# Patient Record
Sex: MALE | Race: WHITE | Employment: UNEMPLOYED | URBAN - METROPOLITAN AREA
[De-identification: names, ages, dates, MRNs, and addresses within clinical notes are randomized per-mention and may not be internally consistent; named-entity substitution may affect disease eponyms.]

---

## 2018-01-02 ENCOUNTER — HOSPITAL ENCOUNTER (OUTPATIENT)
Dept: NEUROLOGY | Facility: CLINIC | Age: 28
Setting detail: THERAPIES SERIES
Discharge: STILL ADMITTED OR ADMINISTRATIVE | End: 2018-01-02
Attending: PSYCHIATRY & NEUROLOGY

## 2018-01-02 ENCOUNTER — HOSPITAL ENCOUNTER (OUTPATIENT)
Dept: NEUROLOGY | Facility: CLINIC | Age: 28
Setting detail: THERAPIES SERIES
Discharge: STILL ADMITTED OR ADMINISTRATIVE | End: 2018-01-02
Attending: SOCIAL WORKER

## 2018-01-02 DIAGNOSIS — S06.9XAA TBI (TRAUMATIC BRAIN INJURY) (H): ICD-10-CM

## 2018-01-02 DIAGNOSIS — R41.89 NEUROCOGNITIVE DEFICITS: ICD-10-CM

## 2018-01-02 DIAGNOSIS — R29.818 NEUROCOGNITIVE DEFICITS: ICD-10-CM

## 2018-01-02 RX ORDER — BENZOCAINE/MENTHOL 6 MG-10 MG
LOZENGE MUCOUS MEMBRANE 3 TIMES DAILY
Status: SHIPPED | COMMUNITY
Start: 2018-01-02 | End: 2023-02-16

## 2018-01-02 RX ORDER — GINSENG 100 MG
1 CAPSULE ORAL
Status: SHIPPED | COMMUNITY
Start: 2018-01-02 | End: 2022-04-26

## 2018-01-02 RX ORDER — DOCUSATE SODIUM 100 MG/1
100 CAPSULE, LIQUID FILLED ORAL 2 TIMES DAILY PRN
Status: SHIPPED | COMMUNITY
Start: 2018-01-02 | End: 2023-05-16

## 2018-01-02 RX ORDER — LEVETIRACETAM 750 MG/1
3000 TABLET ORAL DAILY
Status: SHIPPED | COMMUNITY
Start: 2018-01-02

## 2018-01-02 RX ORDER — LOPERAMIDE HCL 2 MG
2 CAPSULE ORAL 4 TIMES DAILY PRN
Status: SHIPPED | COMMUNITY
Start: 2018-01-02 | End: 2023-02-16

## 2018-01-02 RX ORDER — TRAZODONE HYDROCHLORIDE 50 MG/1
50 TABLET, FILM COATED ORAL AT BEDTIME
Status: SHIPPED | COMMUNITY
Start: 2018-01-02 | End: 2023-02-16

## 2018-01-02 RX ORDER — IBUPROFEN 200 MG
400 TABLET ORAL EVERY 6 HOURS PRN
Status: SHIPPED | COMMUNITY
Start: 2018-01-02

## 2018-01-02 RX ORDER — DEXTROMETHORPHAN HBR. AND GUAIFENESIN 10; 100 MG/5ML; MG/5ML
10 SOLUTION ORAL EVERY 12 HOURS
Status: SHIPPED | COMMUNITY
Start: 2018-01-02 | End: 2023-02-16

## 2018-01-04 ENCOUNTER — HOSPITAL ENCOUNTER (OUTPATIENT)
Dept: NEUROLOGY | Facility: CLINIC | Age: 28
Setting detail: THERAPIES SERIES
Discharge: STILL ADMITTED OR ADMINISTRATIVE | End: 2018-01-04
Attending: PSYCHIATRY & NEUROLOGY

## 2018-01-04 DIAGNOSIS — G40.A09 NONINTRACTABLE ABSENCE EPILEPSY WITHOUT STATUS EPILEPTICUS (H): ICD-10-CM

## 2018-01-04 DIAGNOSIS — G04.81 RASMUSSEN'S SYNDROME: ICD-10-CM

## 2018-01-04 DIAGNOSIS — R41.89 NEUROCOGNITIVE DEFICITS: ICD-10-CM

## 2018-01-04 DIAGNOSIS — R29.818 NEUROCOGNITIVE DEFICITS: ICD-10-CM

## 2018-01-04 DIAGNOSIS — F02.80 MAJOR NEUROCOGNITIVE DISORDER DUE TO MULTIPLE ETIOLOGIES (H): ICD-10-CM

## 2018-01-04 DIAGNOSIS — F33.1 MAJOR DEPRESSIVE DISORDER, RECURRENT EPISODE, MODERATE (H): ICD-10-CM

## 2018-01-10 ENCOUNTER — HOSPITAL ENCOUNTER (OUTPATIENT)
Dept: SPEECH THERAPY | Age: 28
Setting detail: THERAPIES SERIES
Discharge: STILL ADMITTED OR ADMINISTRATIVE | End: 2018-01-10
Attending: PSYCHIATRY & NEUROLOGY

## 2018-01-10 ENCOUNTER — HOSPITAL ENCOUNTER (OUTPATIENT)
Dept: OCCUPATIONAL THERAPY | Age: 28
Setting detail: THERAPIES SERIES
Discharge: STILL ADMITTED OR ADMINISTRATIVE | End: 2018-01-10
Attending: PSYCHIATRY & NEUROLOGY

## 2018-01-10 DIAGNOSIS — R41.89 COGNITIVE IMPAIRMENT: ICD-10-CM

## 2018-01-10 DIAGNOSIS — R41.3: ICD-10-CM

## 2018-01-10 DIAGNOSIS — T81.89XS: ICD-10-CM

## 2018-01-29 ENCOUNTER — HOSPITAL ENCOUNTER (OUTPATIENT)
Dept: SPEECH THERAPY | Age: 28
Setting detail: THERAPIES SERIES
Discharge: STILL ADMITTED OR ADMINISTRATIVE | End: 2018-01-29
Attending: SPEECH-LANGUAGE PATHOLOGIST

## 2018-01-29 ENCOUNTER — HOSPITAL ENCOUNTER (OUTPATIENT)
Dept: OCCUPATIONAL THERAPY | Age: 28
Setting detail: THERAPIES SERIES
Discharge: STILL ADMITTED OR ADMINISTRATIVE | End: 2018-01-29
Attending: OCCUPATIONAL THERAPIST

## 2018-01-29 DIAGNOSIS — T81.89XS: ICD-10-CM

## 2018-01-29 DIAGNOSIS — R41.89 COGNITIVE IMPAIRMENT: ICD-10-CM

## 2018-01-29 DIAGNOSIS — R41.3: ICD-10-CM

## 2018-02-02 ENCOUNTER — HOSPITAL ENCOUNTER (OUTPATIENT)
Dept: NEUROLOGY | Facility: CLINIC | Age: 28
Setting detail: THERAPIES SERIES
Discharge: STILL ADMITTED OR ADMINISTRATIVE | End: 2018-02-02
Attending: PSYCHIATRY & NEUROLOGY

## 2018-02-02 DIAGNOSIS — F02.80 MAJOR NEUROCOGNITIVE DISORDER DUE TO MULTIPLE ETIOLOGIES (H): ICD-10-CM

## 2018-02-02 DIAGNOSIS — G04.81 RASMUSSEN'S SYNDROME: ICD-10-CM

## 2018-02-02 DIAGNOSIS — G40.A09 NONINTRACTABLE ABSENCE EPILEPSY WITHOUT STATUS EPILEPTICUS (H): ICD-10-CM

## 2018-02-02 DIAGNOSIS — F33.1 MAJOR DEPRESSIVE DISORDER, RECURRENT EPISODE, MODERATE (H): ICD-10-CM

## 2018-02-05 ENCOUNTER — HOSPITAL ENCOUNTER (OUTPATIENT)
Dept: SPEECH THERAPY | Age: 28
Setting detail: THERAPIES SERIES
Discharge: STILL ADMITTED OR ADMINISTRATIVE | End: 2018-02-05
Attending: PSYCHIATRY & NEUROLOGY

## 2018-02-05 ENCOUNTER — HOSPITAL ENCOUNTER (OUTPATIENT)
Dept: OCCUPATIONAL THERAPY | Age: 28
Setting detail: THERAPIES SERIES
Discharge: STILL ADMITTED OR ADMINISTRATIVE | End: 2018-02-05
Attending: PSYCHIATRY & NEUROLOGY

## 2018-02-05 DIAGNOSIS — R41.3: ICD-10-CM

## 2018-02-05 DIAGNOSIS — T81.89XS: ICD-10-CM

## 2018-02-05 DIAGNOSIS — R41.89 COGNITIVE IMPAIRMENT: ICD-10-CM

## 2018-02-12 ENCOUNTER — HOSPITAL ENCOUNTER (OUTPATIENT)
Dept: SPEECH THERAPY | Age: 28
Setting detail: THERAPIES SERIES
Discharge: STILL ADMITTED OR ADMINISTRATIVE | End: 2018-02-12
Attending: PSYCHIATRY & NEUROLOGY

## 2018-02-12 ENCOUNTER — HOSPITAL ENCOUNTER (OUTPATIENT)
Dept: OCCUPATIONAL THERAPY | Age: 28
Setting detail: THERAPIES SERIES
Discharge: STILL ADMITTED OR ADMINISTRATIVE | End: 2018-02-12
Attending: PSYCHIATRY & NEUROLOGY

## 2018-02-12 DIAGNOSIS — R41.89 COGNITIVE IMPAIRMENT: ICD-10-CM

## 2018-02-12 DIAGNOSIS — R41.3: ICD-10-CM

## 2018-02-12 DIAGNOSIS — T81.89XS: ICD-10-CM

## 2018-02-13 ENCOUNTER — HOSPITAL ENCOUNTER (OUTPATIENT)
Dept: NEUROLOGY | Facility: CLINIC | Age: 28
Setting detail: THERAPIES SERIES
Discharge: STILL ADMITTED OR ADMINISTRATIVE | End: 2018-02-13
Attending: PSYCHIATRY & NEUROLOGY

## 2018-02-13 DIAGNOSIS — R41.89 NEUROCOGNITIVE DEFICITS: ICD-10-CM

## 2018-02-13 DIAGNOSIS — R29.818 NEUROCOGNITIVE DEFICITS: ICD-10-CM

## 2018-03-01 ENCOUNTER — HOSPITAL ENCOUNTER (OUTPATIENT)
Dept: SPEECH THERAPY | Age: 28
Setting detail: THERAPIES SERIES
Discharge: STILL ADMITTED OR ADMINISTRATIVE | End: 2018-03-01
Attending: PSYCHIATRY & NEUROLOGY

## 2018-03-01 DIAGNOSIS — R41.3: ICD-10-CM

## 2018-03-01 DIAGNOSIS — T81.89XS: ICD-10-CM

## 2018-03-12 ENCOUNTER — HOSPITAL ENCOUNTER (OUTPATIENT)
Dept: SPEECH THERAPY | Age: 28
Setting detail: THERAPIES SERIES
Discharge: STILL ADMITTED OR ADMINISTRATIVE | End: 2018-03-12
Attending: PSYCHIATRY & NEUROLOGY

## 2018-03-12 DIAGNOSIS — R41.3: ICD-10-CM

## 2018-03-12 DIAGNOSIS — T81.89XS: ICD-10-CM

## 2018-03-29 ENCOUNTER — HOSPITAL ENCOUNTER (OUTPATIENT)
Dept: SPEECH THERAPY | Age: 28
Setting detail: THERAPIES SERIES
Discharge: STILL ADMITTED OR ADMINISTRATIVE | End: 2018-03-29
Attending: PSYCHIATRY & NEUROLOGY

## 2018-03-29 ENCOUNTER — HOSPITAL ENCOUNTER (OUTPATIENT)
Dept: OCCUPATIONAL THERAPY | Age: 28
Setting detail: THERAPIES SERIES
Discharge: STILL ADMITTED OR ADMINISTRATIVE | End: 2018-03-29
Attending: PSYCHIATRY & NEUROLOGY

## 2018-03-29 DIAGNOSIS — M25.60 DECREASED RANGE OF MOTION: ICD-10-CM

## 2018-03-29 DIAGNOSIS — T81.89XS: ICD-10-CM

## 2018-03-29 DIAGNOSIS — R41.89 COGNITIVE IMPAIRMENT: ICD-10-CM

## 2018-03-29 DIAGNOSIS — R41.3: ICD-10-CM

## 2018-04-05 ENCOUNTER — AMBULATORY - HEALTHEAST (OUTPATIENT)
Dept: SPEECH THERAPY | Facility: CLINIC | Age: 28
End: 2018-04-05

## 2018-04-05 ENCOUNTER — HOSPITAL ENCOUNTER (OUTPATIENT)
Dept: OCCUPATIONAL THERAPY | Age: 28
Setting detail: THERAPIES SERIES
Discharge: STILL ADMITTED OR ADMINISTRATIVE | End: 2018-04-05
Attending: PSYCHIATRY & NEUROLOGY

## 2018-04-05 ENCOUNTER — HOSPITAL ENCOUNTER (OUTPATIENT)
Dept: SPEECH THERAPY | Age: 28
Setting detail: THERAPIES SERIES
Discharge: STILL ADMITTED OR ADMINISTRATIVE | End: 2018-04-05
Attending: PSYCHIATRY & NEUROLOGY

## 2018-04-05 DIAGNOSIS — M25.60 DECREASED RANGE OF MOTION: ICD-10-CM

## 2018-04-05 DIAGNOSIS — R41.3: ICD-10-CM

## 2018-04-05 DIAGNOSIS — T81.89XS: ICD-10-CM

## 2018-04-12 ENCOUNTER — HOSPITAL ENCOUNTER (OUTPATIENT)
Dept: SPEECH THERAPY | Age: 28
Setting detail: THERAPIES SERIES
Discharge: STILL ADMITTED OR ADMINISTRATIVE | End: 2018-04-12
Attending: PSYCHIATRY & NEUROLOGY

## 2018-04-12 ENCOUNTER — HOSPITAL ENCOUNTER (OUTPATIENT)
Dept: OCCUPATIONAL THERAPY | Age: 28
Setting detail: THERAPIES SERIES
Discharge: STILL ADMITTED OR ADMINISTRATIVE | End: 2018-04-12
Attending: PSYCHIATRY & NEUROLOGY

## 2018-04-12 DIAGNOSIS — R41.3: ICD-10-CM

## 2018-04-12 DIAGNOSIS — M25.60 DECREASED RANGE OF MOTION: ICD-10-CM

## 2018-04-12 DIAGNOSIS — T81.89XS: ICD-10-CM

## 2018-04-19 ENCOUNTER — HOSPITAL ENCOUNTER (OUTPATIENT)
Dept: OCCUPATIONAL THERAPY | Age: 28
Setting detail: THERAPIES SERIES
Discharge: STILL ADMITTED OR ADMINISTRATIVE | End: 2018-04-19
Attending: PSYCHIATRY & NEUROLOGY

## 2018-04-19 DIAGNOSIS — M25.60 DECREASED RANGE OF MOTION: ICD-10-CM

## 2018-04-26 ENCOUNTER — HOSPITAL ENCOUNTER (OUTPATIENT)
Dept: OCCUPATIONAL THERAPY | Age: 28
Setting detail: THERAPIES SERIES
Discharge: STILL ADMITTED OR ADMINISTRATIVE | End: 2018-04-26
Attending: PSYCHIATRY & NEUROLOGY

## 2018-04-26 DIAGNOSIS — M25.60 DECREASED RANGE OF MOTION: ICD-10-CM

## 2018-05-07 ENCOUNTER — AMBULATORY - HEALTHEAST (OUTPATIENT)
Dept: SPEECH THERAPY | Facility: CLINIC | Age: 28
End: 2018-05-07

## 2018-05-10 ENCOUNTER — HOSPITAL ENCOUNTER (OUTPATIENT)
Dept: OCCUPATIONAL THERAPY | Age: 28
Setting detail: THERAPIES SERIES
Discharge: STILL ADMITTED OR ADMINISTRATIVE | End: 2018-05-10
Attending: PSYCHIATRY & NEUROLOGY

## 2018-05-10 DIAGNOSIS — M25.60 DECREASED RANGE OF MOTION: ICD-10-CM

## 2018-05-11 ENCOUNTER — RECORDS - HEALTHEAST (OUTPATIENT)
Dept: ADMINISTRATIVE | Facility: OTHER | Age: 28
End: 2018-05-11

## 2018-05-29 ENCOUNTER — HOSPITAL ENCOUNTER (OUTPATIENT)
Dept: NEUROLOGY | Facility: CLINIC | Age: 28
Setting detail: THERAPIES SERIES
Discharge: STILL ADMITTED OR ADMINISTRATIVE | End: 2018-05-29
Attending: PSYCHIATRY & NEUROLOGY

## 2018-05-29 DIAGNOSIS — R29.818 NEUROCOGNITIVE DEFICITS: ICD-10-CM

## 2018-05-29 DIAGNOSIS — R41.89 NEUROCOGNITIVE DEFICITS: ICD-10-CM

## 2018-07-03 ENCOUNTER — HOSPITAL ENCOUNTER (OUTPATIENT)
Dept: OCCUPATIONAL THERAPY | Age: 28
Setting detail: THERAPIES SERIES
Discharge: STILL ADMITTED OR ADMINISTRATIVE | End: 2018-07-03
Attending: PSYCHIATRY & NEUROLOGY

## 2018-07-03 DIAGNOSIS — M25.60 DECREASED RANGE OF MOTION: ICD-10-CM

## 2018-09-11 ENCOUNTER — HOSPITAL ENCOUNTER (OUTPATIENT)
Dept: NEUROLOGY | Facility: CLINIC | Age: 28
Setting detail: THERAPIES SERIES
Discharge: STILL ADMITTED OR ADMINISTRATIVE | End: 2018-09-11
Attending: PSYCHIATRY & NEUROLOGY

## 2018-09-11 DIAGNOSIS — F06.30 MOOD DISORDER IN CONDITIONS CLASSIFIED ELSEWHERE: ICD-10-CM

## 2018-09-14 ENCOUNTER — COMMUNICATION - HEALTHEAST (OUTPATIENT)
Dept: NEUROLOGY | Facility: CLINIC | Age: 28
End: 2018-09-14

## 2018-09-14 DIAGNOSIS — R29.818 NEUROCOGNITIVE DEFICITS: ICD-10-CM

## 2018-09-14 DIAGNOSIS — R41.89 NEUROCOGNITIVE DEFICITS: ICD-10-CM

## 2018-10-17 ENCOUNTER — COMMUNICATION - HEALTHEAST (OUTPATIENT)
Dept: NEUROLOGY | Facility: CLINIC | Age: 28
End: 2018-10-17

## 2018-10-17 DIAGNOSIS — R29.818 NEUROCOGNITIVE DEFICITS: ICD-10-CM

## 2018-10-17 DIAGNOSIS — R41.89 NEUROCOGNITIVE DEFICITS: ICD-10-CM

## 2018-11-15 ENCOUNTER — HOSPITAL ENCOUNTER (OUTPATIENT)
Dept: NEUROLOGY | Facility: CLINIC | Age: 28
Setting detail: THERAPIES SERIES
Discharge: STILL ADMITTED OR ADMINISTRATIVE | End: 2018-11-15
Attending: PSYCHIATRY & NEUROLOGY

## 2018-11-15 DIAGNOSIS — R29.818 NEUROCOGNITIVE DEFICITS: ICD-10-CM

## 2018-11-15 DIAGNOSIS — R41.89 NEUROCOGNITIVE DEFICITS: ICD-10-CM

## 2019-04-09 ENCOUNTER — HOSPITAL ENCOUNTER (OUTPATIENT)
Dept: NEUROLOGY | Facility: CLINIC | Age: 29
Setting detail: THERAPIES SERIES
Discharge: STILL ADMITTED OR ADMINISTRATIVE | End: 2019-04-09
Attending: PSYCHIATRY & NEUROLOGY

## 2019-04-09 DIAGNOSIS — F06.30 MOOD DISORDER IN CONDITIONS CLASSIFIED ELSEWHERE: ICD-10-CM

## 2019-04-17 ENCOUNTER — COMMUNICATION - HEALTHEAST (OUTPATIENT)
Dept: NEUROLOGY | Facility: CLINIC | Age: 29
End: 2019-04-17

## 2019-04-17 DIAGNOSIS — R29.818 NEUROCOGNITIVE DEFICITS: ICD-10-CM

## 2019-04-17 DIAGNOSIS — R41.89 NEUROCOGNITIVE DEFICITS: ICD-10-CM

## 2019-08-13 ENCOUNTER — COMMUNICATION - HEALTHEAST (OUTPATIENT)
Dept: NEUROLOGY | Facility: CLINIC | Age: 29
End: 2019-08-13

## 2019-08-13 DIAGNOSIS — R29.818 NEUROCOGNITIVE DEFICITS: ICD-10-CM

## 2019-08-13 DIAGNOSIS — R41.89 NEUROCOGNITIVE DEFICITS: ICD-10-CM

## 2019-08-19 ENCOUNTER — COMMUNICATION - HEALTHEAST (OUTPATIENT)
Dept: NEUROLOGY | Facility: CLINIC | Age: 29
End: 2019-08-19

## 2019-10-08 ENCOUNTER — HOSPITAL ENCOUNTER (OUTPATIENT)
Dept: NEUROLOGY | Facility: CLINIC | Age: 29
Setting detail: THERAPIES SERIES
Discharge: STILL ADMITTED OR ADMINISTRATIVE | End: 2019-10-08
Attending: PSYCHIATRY & NEUROLOGY

## 2019-10-08 DIAGNOSIS — F06.30 MOOD DISORDER IN CONDITIONS CLASSIFIED ELSEWHERE: ICD-10-CM

## 2019-12-18 ENCOUNTER — COMMUNICATION - HEALTHEAST (OUTPATIENT)
Dept: NEUROLOGY | Facility: CLINIC | Age: 29
End: 2019-12-18

## 2019-12-18 DIAGNOSIS — R41.89 NEUROCOGNITIVE DEFICITS: ICD-10-CM

## 2019-12-18 DIAGNOSIS — R29.818 NEUROCOGNITIVE DEFICITS: ICD-10-CM

## 2020-01-14 ENCOUNTER — HOSPITAL ENCOUNTER (OUTPATIENT)
Dept: NEUROLOGY | Facility: CLINIC | Age: 30
Setting detail: THERAPIES SERIES
Discharge: STILL ADMITTED OR ADMINISTRATIVE | End: 2020-01-14
Attending: PSYCHIATRY & NEUROLOGY

## 2020-01-14 DIAGNOSIS — F06.30 MOOD DISORDER IN CONDITIONS CLASSIFIED ELSEWHERE: ICD-10-CM

## 2020-02-03 ENCOUNTER — COMMUNICATION - HEALTHEAST (OUTPATIENT)
Dept: NEUROLOGY | Facility: CLINIC | Age: 30
End: 2020-02-03

## 2020-02-03 DIAGNOSIS — F06.30 MOOD DISORDER IN CONDITIONS CLASSIFIED ELSEWHERE: ICD-10-CM

## 2020-06-17 ENCOUNTER — COMMUNICATION - HEALTHEAST (OUTPATIENT)
Dept: NEUROLOGY | Facility: CLINIC | Age: 30
End: 2020-06-17

## 2020-06-17 DIAGNOSIS — R41.89 NEUROCOGNITIVE DEFICITS: ICD-10-CM

## 2020-06-17 DIAGNOSIS — R29.818 NEUROCOGNITIVE DEFICITS: ICD-10-CM

## 2020-06-29 ENCOUNTER — COMMUNICATION - HEALTHEAST (OUTPATIENT)
Dept: NEUROLOGY | Facility: CLINIC | Age: 30
End: 2020-06-29

## 2020-06-29 DIAGNOSIS — F06.30 MOOD DISORDER IN CONDITIONS CLASSIFIED ELSEWHERE: ICD-10-CM

## 2020-06-29 RX ORDER — VENLAFAXINE HYDROCHLORIDE 75 MG/1
CAPSULE, EXTENDED RELEASE ORAL
Qty: 31 CAPSULE | Refills: 11 | Status: SHIPPED | OUTPATIENT
Start: 2020-06-29 | End: 2021-07-19

## 2020-08-18 ENCOUNTER — HOSPITAL ENCOUNTER (OUTPATIENT)
Dept: NEUROLOGY | Facility: CLINIC | Age: 30
Setting detail: THERAPIES SERIES
Discharge: STILL ADMITTED OR ADMINISTRATIVE | End: 2020-08-18
Attending: PSYCHIATRY & NEUROLOGY

## 2020-08-18 DIAGNOSIS — F43.20 ADJUSTMENT DISORDER, UNSPECIFIED TYPE: ICD-10-CM

## 2020-09-22 ENCOUNTER — HOSPITAL ENCOUNTER (OUTPATIENT)
Dept: NEUROLOGY | Facility: CLINIC | Age: 30
Setting detail: THERAPIES SERIES
Discharge: STILL ADMITTED OR ADMINISTRATIVE | End: 2020-09-22
Attending: PSYCHIATRY & NEUROLOGY

## 2020-09-22 DIAGNOSIS — S06.9XAA TBI (TRAUMATIC BRAIN INJURY) (H): ICD-10-CM

## 2021-03-23 ENCOUNTER — HOSPITAL ENCOUNTER (OUTPATIENT)
Dept: NEUROLOGY | Facility: CLINIC | Age: 31
Setting detail: THERAPIES SERIES
Discharge: STILL ADMITTED OR ADMINISTRATIVE | End: 2021-03-23
Attending: PSYCHIATRY & NEUROLOGY

## 2021-05-27 NOTE — PROGRESS NOTES
Outpatient Followup Psychiatric Evaluation      Pertinent History: Patient first saw me January 2 of 2018.  The patient had been followed at that time by Dr. Nguyen in the primary care clinic.  The patient has a history of Werner's syndrome diagnosed at age 3.  He had been followed by pediatric neurology.  He been on multiple medications over the years.  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  This was to control seizures and it had a positive effect on that.  The patient has had motor dysfunction and required a tendon release procedure.  When I first saw the patient he was on a combination of Prozac, Keppra and trazodone but continued to struggle with depression.  At that visit we did taper off the Prozac and then started the patient on low-dose venlafaxine.      I also had the patient go for neuropsychological testing.  Please see that data in the chart.     In spring 2018 we did increase the patient's Effexor XL.  In May 2018 we increased the Effexor further to 225 mg a day.    I saw the patient in September 2018 and at that time we did not make any medication changes.    I saw the patient in November 2019.  At that time he was complaining of lower motivation and some difficulty with sleep and poor sleep hygiene.  There were some more situational stressors including the death of his girlfriends mother.  We did did increase the patient's Effexor to 300 mg a day.    Current Symptoms:   Patient reports he is doing quite well.  His staff member present reports the patient is doing very well.  The patient believes the increase in Effexor was helpful.  He denies having any depression.  He does not feel hopeless or helpless or worthless.  No desire to be dead or thoughts of suicide.  No psychotic symptoms.  He sleeping well and eating well.  No change in cognition.  He states things are going well in life and he is engaged in social activities.  He had a birthday last weekend and grilled out  with family and friends and reported that went well.  No new medical issues.  No side effects to the medication.  He is requesting no medication changes.    I did review and fill out paperwork brought by the staff.    Current Medications:  Please see the records.  Medications were personally reviewed.    Medication Compliance: Yes    Side Effects to Medications: No      Vitals:  Wt Readings from Last 3 Encounters:   01/02/18 197 lb (89.4 kg)     Temp Readings from Last 3 Encounters:   No data found for Temp     BP Readings from Last 3 Encounters:   01/02/18 113/80     Pulse Readings from Last 3 Encounters:   01/02/18 74       Problem List (Please see medical records):      Mental Status Exam:   Appearance:  The patient was alert, comfortable and calm. No agitation. Not currently in any pain. No evidence of any shortness of breath.  Behavior:  The patient is calm, cooperative, with no agitation or obvious distress. The patient does participate. No restlessness. No reports of any recent behavioral dyscontrol.  Speech:  Sentence structure is intact.  Soft spoken in monotone but able to initiate.  Able to dialogue. Answers are consistent and appropriate. Not pressured. Not rambling.  Mood/Affect:  Patient appears alert. No obvious depression or anxiety.  Smiling and bright.  No irritability. No lability.  Thought Content:  No evidence of any psychosis. No reports of any recent psychosis.  Suicidal or Homicidal Thoughts:  None apparent or reported. The patient denies any suicidal or homicidal ideation.   Thought Process/Formulation:  Able to track and follow. No racing thoughts. Not disorganized. Able to participate.  Associations:  Grossly intact. Able to process information.  Fund of Knowledge:  Grossly adequate.  Please see the therapy notes.  Attention/Concentration:  Attentive. Able to track and follow. Concentration appears grossly intact. Not disorganized.  Insight:  Grossly intact.   Judgement:  Grossly  intact  Memory:  Grossly intact.   Motor Status: No recent apparent change.  No current tremor.  Orientation: Grossly oriented.    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     Rule out major depressive disorder, chronic, severe, without psychosis      Plan:  Medication Adjustment:  I will make no psychotropic medication changes at this time.    Other:   Patient will follow up here in 6 months for med check.  Both he and the staff member present agreed to call with any questions or concerns.    Continue with the support of the clinic, reassurance, and redirection. Staff monitoring and ongoing assessments per team plan. Current psychotropic medication appears to represent the minimum effective dosage and appears medically necessary. We will continue to monitor and reassess. This team will utilize appropriate emergency services if necessary. I will make myself available if concerns or problems arise.    Ren Manuel MD

## 2021-05-27 NOTE — PROGRESS NOTES
Patient's impression of how medication is working? Doing well no concerns at this time.     Compliant with Medication? Yes     Side Effects: None    Current Symptoms : No    Pain (0-10) No  Appetite change No  Sleep disturbance No  Change in energy No  Change in interest No  Change in concentration No  Psychosis/Hallucinations No  Negative thoughts No  Mood swings No  Alcohol use No  Drug use No  Anxiety low  Sad/depressed mood low

## 2021-05-31 VITALS — WEIGHT: 197 LBS

## 2021-06-02 NOTE — PROGRESS NOTES
Outpatient Followup Psychiatric Evaluation      Pertinent History: Patient first saw me January 2 of 2018.  The patient had been followed at that time by Dr. Nguyen in the primary care clinic.  The patient has a history of Werner's syndrome diagnosed at age 3.  He had been followed by pediatric neurology.  He been on multiple medications over the years.  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  This was to control seizures and it had a positive effect on that.  The patient has had motor dysfunction and required a tendon release procedure.  When I first saw the patient he was on a combination of Prozac, Keppra and trazodone but continued to struggle with depression.  At that visit we did taper off the Prozac and then started the patient on low-dose venlafaxine.      I also had the patient go for neuropsychological testing.  Please see that data in the chart.     In spring 2018 we did increase the patient's Effexor XL.  In May 2018 we increased the Effexor further to 225 mg a day.    I saw the patient in September 2018 and at that time we did not make any medication changes.    I saw the patient in November 2019.  At that time he was complaining of lower motivation and some difficulty with sleep and poor sleep hygiene.  There were some more situational stressors including the death of his girlfriends mother.  We did did increase the patient's Effexor to 300 mg a day.    I saw the patient in April 2019.  He was doing well at that time.  The increase in Effexor had been helpful.  We did not make any medication changes at that time.    Current Symptoms:   Patient reports that he has been a bit more depressed.  He is been out of work, actually furloughed because there is been no work for the company for 3 to 4 weeks.  He is looking for another job and does have an interview next week at [employer] at [employer].  He is worked there before and enjoys that.  He also reports  his grandmother is dying and has dementia which is been difficult on the family.  He also states his brother is being deployed for 1 year in to [place] and that is a bit stressful although he did speak about him and the situation with much pride.    The patient reports that he has had no desire to be dead or thoughts of suicide.  No psychosis.  No change in cognition.  He sleeping well.  Appetite's been good.  No new medical issues.  No new allergies.  He denies having any side effects to the medication.    The staff present report the patient is doing fairly well but they have noted the patient has been a bit more depressed recently.  We did talk about treatment options and the patient would like a further increase in the Effexor which I have done.  Risks and benefits were discussed.    I did review and fill out paperwork brought by the staff.    Current Medications:  Please see the records.  Medications were personally reviewed.    Medication Compliance: Yes    Side Effects to Medications: No      Vitals:  Wt Readings from Last 3 Encounters:   01/02/18 197 lb (89.4 kg)     Temp Readings from Last 3 Encounters:   No data found for Temp     BP Readings from Last 3 Encounters:   01/02/18 113/80     Pulse Readings from Last 3 Encounters:   01/02/18 74       Problem List (Please see medical records):      Mental Status Exam:   Appearance:  Patient appears slightly slow and flat.  No obvious shortness of breath. No obvious pain at this time.  Behavior: Slow.  Needing prompts to participate.  Not agitated. No restlessness.  No reports of any significant recent behavioral dyscontrol.  He was pleasant polite and cooperative and engaged in our converse  Speech: Somewhat monotone and soft-spoken.  Answers were consistent and appropriate.  Not pressured or rambling.  Mood/Affect:  Flat, slow, somewhat depressed. No current anxiety or agitation. Not currently labile.  Thought Content:  No evidence of psychosis. No recent reported  psychosis.  Suicidal or Homicidal Thoughts:  None apparent or reported.   Thought Process/Formulation:  Slow. [place]. No evidence of any racing thoughts.  Able to track and follow.  Associations: No obvious loosening of associations.  Slow. [place].  Fund of Knowledge:  Please see the therapy notes. No apparent recent change.  Attention/Concentration:  Flat slow.  Able to follow some simple conversation. No apparent recent change.  Insight: No apparent recent change.    Judgement: No apparent recent change.    Memory:   Slow.   Motor Status:   No current tremor.  Orientation: No reports of any recent change..     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     Rule out major depressive disorder, chronic, severe, without psychosis      Plan:  Medication Adjustment:  I have increased the patient's Effexor XR to 375 mg a day.  Risks and benefits were discussed.    Other:   Patient will follow up here in 3-4 months for med check.  Both he and the staff member present agreed to call with any questions or concerns.    Continue with the support of the clinic, reassurance, and redirection. Staff monitoring and ongoing assessments per team plan. Current psychotropic medication appears to represent the minimum effective dosage and appears medically necessary. We will continue to monitor and reassess. This team will utilize appropriate emergency services if necessary. I will make myself available if concerns or problems arise.    Ren Manuel MD

## 2021-06-02 NOTE — PROGRESS NOTES
Patient's impression of how medication is working? Patient thinks he could use a slight increase in medications.    Compliant with Medication? Yes     Side Effects: None    Pain (0-10) No  Appetite change No  Sleep disturbance No  Change in energy No  Change in interest No  Change in concentration No  Psychosis/Hallucinations No  Negative thoughts No  Mood swings No  Alcohol use No  Drug use No  Anxiety none  Sad/depressed mood moderate

## 2021-06-05 NOTE — PROGRESS NOTES
Outpatient Followup Psychiatric Evaluation      Pertinent History: Patient first saw me January 2 of 2018.  The patient had been followed at that time by Dr. Nguyen in the primary care clinic.  The patient has a history of Werner's syndrome diagnosed at age 3.  He had been followed by pediatric neurology.  He been on multiple medications over the years.  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  This was to control seizures and it had a positive effect on that.  The patient has had motor dysfunction and required a tendon release procedure.  When I first saw the patient he was on a combination of Prozac, Keppra and trazodone but continued to struggle with depression.  At that visit we did taper off the Prozac and then started the patient on low-dose venlafaxine.      I also had the patient go for neuropsychological testing.  Please see that data in the chart.     In spring 2018 we did increase the patient's Effexor XL.  In May 2018 we increased the Effexor further to 225 mg a day.    I saw the patient in September 2018 and at that time we did not make any medication changes.    I saw the patient in November 2019.  At that time he was complaining of lower motivation and some difficulty with sleep and poor sleep hygiene.  There were some more situational stressors including the death of his girlfriends mother.  We did did increase the patient's Effexor to 300 mg a day.    I saw the patient in April 2019.  He was doing well at that time.  The increase in Effexor had been helpful.  We did not make any medication changes at that time.    I saw the patient in October 2019.  He had been furloughed from work and was looking for another job.  He was a bit more depressed and frustrated.  His grandmother was dying with dementia which was difficult for him.  His brother was being deployed to [place].  We did increase his Effexor at that time to 375 mg a day.    Current Symptoms:   I did interview the  "patient as well as his staff member.  Both report the patient is doing very well.  Although the patient's grandmother is still dying with dementia and the patient is still between jobs he is doing much better with regard to his mood.  His brother has been deployed to [place] but that apparently is going well.  The patient has a new niece and he proudly showed me a picture of her.  He states his mood is better and he believes the increase in the Effexor has helped.  He is sleeping well at night.  No change in appetite.  No desire to be dead or thoughts of suicide.  No psychosis.  He did take a job at [employer] but left that because \"it was not a good fit.  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  He has not yet started there.    No new medical issues or diagnoses.  No new allergies.  No side effects to the medication.  Both the patient and staff are requesting no medication changes at this time.    I did review and fill out paperwork brought by the staff.    Current Medications:  Please see the records.  Medications were personally reviewed.    Medication Compliance: Yes    Side Effects to Medications: No      Vitals:  Wt Readings from Last 3 Encounters:   01/02/18 197 lb (89.4 kg)     Temp Readings from Last 3 Encounters:   No data found for Temp     BP Readings from Last 3 Encounters:   01/02/18 113/80     Pulse Readings from Last 3 Encounters:   01/02/18 74       Problem List (Please see medical records):      Mental Status Exam:   Appearance:  The patient was alert, comfortable and calm. No agitation. Not currently in any pain. No evidence of any shortness of breath.  Well-groomed.  Behavior:  The patient is calm, cooperative, with no agitation or obvious distress. The patient does participate. No restlessness. No reports of any recent behavioral dyscontrol.  Cooperative, pleasant and polite.  Participating in initiating " well.  Speech:  Sentence structure is intact.  Somewhat monotone but this is baseline.  More initiation today.  Able to dialogue. Answers are consistent and appropriate. Not pressured. Not rambling.  Mood/Affect:  Patient appears alert. No obvious depression or anxiety.  Able to smile and joke.  No irritability. No lability.  Thought Content:  No evidence of any psychosis. No reports of any recent psychosis.  Suicidal or Homicidal Thoughts:  None apparent or reported. The patient denies any suicidal or homicidal ideation.   Thought Process/Formulation:  Able to track and follow. No racing thoughts. Not disorganized. Able to participate.  Associations:  Grossly adequate.  Able to process information.  Fund of Knowledge:  Grossly intact. Please see the therapy notes.  Attention/Concentration:  Attentive. Able to track and follow. Concentration appears grossly intact. Not disorganized.  Participating well.  Insight:  Grossly intact.   Judgement:  Grossly intact  Memory:  Grossly intact.   Motor Status: No recent apparent change.  No current tremor.  Orientation: Grossly oriented.    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     Rule out major depressive disorder, chronic, severe, without psychosis      Plan:  Medication Adjustment:  I will make no psychotropic medication changes at this time.    Other:   Patient will follow up here in 3-4 months for med check.  Both he and the staff member present agreed to call with any questions or concerns.    Continue with the support of the clinic, reassurance, and redirection. Staff monitoring and ongoing assessments per team plan. Current psychotropic medication appears to represent the minimum effective dosage and appears medically necessary. We will continue to monitor and reassess. This team will utilize appropriate emergency services if  necessary. I will make myself available if concerns or problems arise.    Ren Manuel MD

## 2021-06-11 NOTE — PROGRESS NOTES
Outpatient Followup TBI Evaluation      Pertinent History:      Patient presents today for the purposes of medication management. Patient first saw me January 2 of 2018.  The patient had been followed at that time by Dr. Nguyen in the primary care clinic.  The patient has a history of Werner's syndrome diagnosed at age 3.  He had been followed by pediatric neurology.  He been on multiple medications over the years.  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  This was to control seizures and it had a positive effect on that.  The patient has had motor dysfunction and required a tendon release procedure.  When I first saw the patient he was on a combination of Prozac, Keppra and trazodone but continued to struggle with depression.  At that visit we did taper off the Prozac and then started the patient on low-dose venlafaxine.       I also had the patient go for neuropsychological testing.  Please see that data in the chart.      In spring 2018 we did increase the patient's Effexor XL.  In May 2018 we increased the Effexor further to 225 mg a day.     I saw the patient in September 2018 and at that time we did not make any medication changes.     I saw the patient in November 2019.  At that time he was complaining of lower motivation and some difficulty with sleep and poor sleep hygiene.  There were some more situational stressors including the death of his girlfriends mother.  We did did increase the patient's Effexor to 300 mg a day.     I saw the patient in April 2019.  He was doing well at that time.  The increase in Effexor had been helpful.  We did not make any medication changes at that time.     I saw the patient in October 2019.  He had been furloughed from work and was looking for another job.  He was a bit more depressed and frustrated.  His grandmother was dying with dementia which was difficult for him.  His brother was being deployed to [place].  We did increase his Effexor at that  time to 375 mg a da I saw the patient in January 2020. The patient was doing well at that time we did not make any medication changes.     HPI:   I did have an opportunity to interview both the patient and staff member Daphne by phone today. Both report the patient is doing very well may had no concerns or complaints.     The patient reports no significant problems with mood. No significant depression. No desire to be data thoughts of suicide. No confusion or change in cognition. No hallucinations or delusions. He denies having any side effects to the medication. He believes the medications a bit helpful. He reports no other new concerns or complaints in both he and Daphne would like to continue with the treatment plan as ordered and states they will call us with any future concerns or complaints.     The patient had been off work due to the coronavirus. He reports despite this is getting by okay. At times a bit bored but he's able to maintain some activity level with staff and is comfortable with the current situation.     We discussed some treatment options and have elected to  Continue with the current treatment plan.      Current Medications: Please see chart. Medications personally reviewed.    There are no active problems to display for this patient.    No past medical history on file.  No past surgical history on file.  No family history on file.  Current Outpatient Medications   Medication Sig Dispense Refill     bacitracin zinc 500 unit/gram Pack Apply 1 packet topically.       carbamide peroxide (DEBROX) 6.5 % otic solution Administer 10 drops into both ears as needed.       dextromethorphan-guaiFENesin (ROBITUSSIN-DM)  mg/5 mL liquid Take 10 mL by mouth every 12 (twelve) hours.       docusate sodium (COLACE) 100 MG capsule Take 100 mg by mouth 2 (two) times a day as needed for constipation.       ferrous sulfate 324 mg (65 mg iron) TbEC        hydrocortisone 1 % cream Apply topically 3 (three) times  a day.       ibuprofen (ADVIL,MOTRIN) 200 MG tablet Take 400 mg by mouth every 6 (six) hours as needed for pain.       levETIRAcetam (KEPPRA) 750 MG tablet Take 3,000 mg by mouth daily.       loperamide (IMODIUM) 2 mg capsule Take 2 mg by mouth 4 (four) times a day as needed for diarrhea.       psyllium with dextrose (METAMUCIL JAR) powder Take by mouth 3 (three) times a day as needed.       traZODone (DESYREL) 50 MG tablet Take 50 mg by mouth at bedtime.       venlafaxine (EFFEXOR-XR) 150 MG 24 hr capsule TAKE 2 CAPSULES (300MG) BY MOUTH ONCE DAILY. (VIALS) 60 capsule 11     venlafaxine (EFFEXOR-XR) 75 MG 24 hr capsule TAKE 1 CAPSULE BY MOUTH ONCE DAILY (ALONG WITH 150MG FOR A TOTAL DOSE OF 375MG) 31 capsule 11     No current facility-administered medications for this visit.        Allergies   Allergen Reactions     Tegretol [Carbamazepine] Unknown     Social History     Socioeconomic History     Marital status: Single     Spouse name: Not on file     Number of children: Not on file     Years of education: Not on file     Highest education level: Not on file   Occupational History     Not on file   Social Needs     Financial resource strain: Not on file     Food insecurity     Worry: Not on file     Inability: Not on file     Transportation needs     Medical: Not on file     Non-medical: Not on file   Tobacco Use     Smoking status: Not on file   Substance and Sexual Activity     Alcohol use: Not on file     Drug use: Not on file     Sexual activity: Not on file   Lifestyle     Physical activity     Days per week: Not on file     Minutes per session: Not on file     Stress: Not on file   Relationships     Social connections     Talks on phone: Not on file     Gets together: Not on file     Attends [employer] service: Not on file     Active member of club or organization: Not on file     Attends meetings of clubs or organizations: Not on file     Relationship status: Not on file     Intimate partner violence     Fear  of current or ex partner: Not on file     Emotionally abused: Not on file     Physically abused: Not on file     Forced sexual activity: Not on file   Other Topics Concern     Not on file   Social History Narrative     Not on file       The following portions of the patient's history were reviewed and updated as appropriate: allergies, current medications, past family history, past medical history, past social history, past surgical history and problem list.    Review of Systems  A comprehensive review of systems was negative except for:what is noted above    Mental Status Exam:   Appearance: . Patient was interviewed via the phone.   Behavior:   Cooperative, pleasant and calm. No irritability or agitation. He was able to participate adequately.  Speech: . Sentence structure was intact. Not pressured or rambling. Answers were consistent and appropriate.  Mood/Affect:   .. Mood was good. No depression. No irritability or liability.  Thought Content: . No hallucinations or delusions   Suicidal or Homicidal Thoughts:   . None apparent or reported.   Thought Process/Formulation:   Not loose. Somewhat concrete but able to track and follow adequately. No racing thoughts. Not disorganized.  Associations: Grossly intact   Fund of Knowledge:  . Grossly unchanged and adequate  Attention/Concentration: . Attentive. Tracking relatively well. Not disorganized. No racing thoughts.   Insight: . Adequate   Judgement:  . adequate  Memory:   . . Grossly unchanged. Adequate. C   Motor Status:  . No reports of any new issues or tremor.   Orientation: . Grossly oriented.     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     Rule out major depressive disorder, chronic, severe, without psychosis     Plan:  Medication Adjustment:   I will make no medication changes at this time.    Other:   Patient will return to  clinic in  Six months. They agree to call or return sooner with any questions or concerns.  Risks and benefits were discussed.  Continue with his individual therapist.     Continue with the support of the clinic, reassurance, and redirection. Staff monitoring and ongoing assessments per team plan. Current psychotropic medication appears to represent the minimum effective dosage and appears medically necessary. We will continue to monitor and reassess. This team will utilize appropriate emergency services if necessary. I will make myself available if concerns or problems arise.    Total time spent with the patient today was  17 minutes with greater than 50% of the time spent in counseling and care coordination. The patient agrees to call before then with any questions, concerns or problems. We will assess for the appropriateness of possible psychotropic medication trials/changes. The patient will seek out appropriate emergency services should that become necessary.    Consent was obtained for this service by one of our care team members    Telephone Visit Details    Type of service: Telephone Visit    Phone Start Time:  9:30 AM     Phone End Time:   9:47 AM    Total time for phone call:  17 minutes    Originating Location: Patient's home    Distant Location:  [facility]    Mode of Communication: Telephone call    Patient Instructions   It was nice speaking with you today for our office visit held over the phone. The following is a summary of our visit.    General Information:      If lab work was done today as part of your evaluation you will generally be contacted via My Chart, mail, or phone with the results within 1-5 days. If there is an alarming result we will contact you by phone. Lab results come back at varying times, I generally wait until all labs are resulted before making comments on results. Please note labs are automatically released to My Chart once available.     If  you need refills please contact your pharmacist. They will send a refill request to me to review. Please allow 3 business days for us to process all refill requests.     Please call or send a medical message through My Chart, with any questions or concerns    If you need any paperwork completed please fax forms to [phone]. Please state if you would like a copy of the completed paperwork, mailed or faxed back to the patient and a fax number to fax the paperwork to. Please allow up to 10 days for paperwork to be completed.    Ren Manuel MD

## 2021-06-15 ENCOUNTER — COMMUNICATION - HEALTHEAST (OUTPATIENT)
Dept: NEUROLOGY | Facility: CLINIC | Age: 31
End: 2021-06-15

## 2021-06-15 DIAGNOSIS — R29.818 NEUROCOGNITIVE DEFICITS: ICD-10-CM

## 2021-06-15 DIAGNOSIS — R41.89 NEUROCOGNITIVE DEFICITS: ICD-10-CM

## 2021-06-15 RX ORDER — VENLAFAXINE HYDROCHLORIDE 150 MG/1
CAPSULE, EXTENDED RELEASE ORAL
Qty: 60 CAPSULE | Refills: 11 | Status: SHIPPED | OUTPATIENT
Start: 2021-06-15 | End: 2022-07-14

## 2021-06-15 NOTE — PROGRESS NOTES
NEUROPSYCHOLOGY PROGRESS NOTE    NAME: Nicholas Daugherty  YOB: 1990     DATE OF EVALUATION: 2/2/2018      SUMMARY OF SESSION:  Nicholas Daugheryt is a 27 y.o.,  male with a history of Werner's encephalitis and epilepsy, who was referred for a cognitive evaluation by Ren Manuel MD.  Mr. Daugherty arrived on time and accompanied by his , Jamilah and his mother, Stephie.  The session lasted a total of 60 minutes. We began the session by discussing his experience during the neuropsychometric evaluation.  I provided Mr. Daugherty with detailed feedback regarding his performance on cognitive testing and his pattern of cognitive strengths and weaknesses.  I discussed my overall impressions and recommendations and provided the opportunity for Mr. Daugherty to ask any questions that he had about the evaluation.  Time was spent discussing the implications of the cognitive test results on daily life, including his vocational pursuits.  We discussed his limited frustration tolerance and behavioral issues, the factors impacting his tendency to have conflict with peers, and the importance of utilizing more regular psychotherapy and behavioral strategies to decrease his outbursts. At the end of the session, he indicated that he understood the results and that I had answered all of his questions.  He was provided with my contact information, should any further questions or concerns arise in the future.    Please contact me with any questions regarding the content of this note.     Cayla Ma, PhD, LP, ABPP  Board Certified in Clinical [place] [address]  Phone: [phone]    For diagnostic and coding purposes, Mr. Daugherty has a history of Major Neurocognitive Disorder due to medical condition (Werner s encephalitis and epilepsy, s/p right hemispherectomy) and was seen for 60 minutes by the neuropsychologist, along with his  mother and a worker from his group home.

## 2021-06-15 NOTE — PROGRESS NOTES
Initial Outpatient Psychiatry Consult Note     1/2/2018    Nicholas Daugherty, a 27 y.o. male, who presents today for his first [facility] clinic contact.  He apparently is followed by Dr. Nguyen in his primary care clinic.  The patient does have a legal guardian who is not here today but aware of this appointment and is supportive of this evaluation by report.  We have limited information but the patient presents with a group home staff member named Joanna.      The patient apparently diagnosed with Werner's syndrome at age 3 when he began having vomiting and eye twitching.  He was diagnosed by a pediatric neurologist.  The patient been on many medications at that time with limited effort.  He underwent a complete right hemispherectomyme after third grade.  This apparently did improve his seizures.  He had difficulty with motor function and did require a tendon release procedure.  He presents at this time on a combination of Prozac, Keppra and trazodone.  Despite this he continues to struggle with depression.  His last neuropsych test was back in 2006.  Please see below for details.    Patient presents today with his group home staff member Joanna.  The patient confirms the above mentioned history.  He reports that he has done better on Prozac than off of it.  He apparently was off Prozac a couple of years but restarted 3 years ago.  Despite this over the last number of months or possibly years he has become more depressed.  He reports having lower motivation and lower interest.  He stopped doing things he used to enjoy doing.  He reports he recently was let go from work due to tardiness.  He states he typically can get to work if he enjoys doing the job.  He denies however having any desire to be dead or thoughts of suicide but admits he makes such statements if he is frustrated but does not believe he would ever act on it.  He is able to contract for safety.  He believes he has been a bit more irritable and can be  verbally aggressive.  No history of any physical aggression.  He describes negative thinking about himself and his abilities and this is quite self-deprecatory.    She reports his frustration is over the perception that he does not have freedom but is not able to do things that he wants to do.  The staff member confirmed that there were no specific restrictions on the patient other than he needs to check in daily and let them know when he is taking his medications otherwise he can come and go from his apartment.  The patient admits that many of his perceptions of not being able to do things are inaccurate and because he himself is not able to follow through with things.    The patient denies having any psychosis.  He states that he has had some trouble with sleep and recently had a sleep study which showed that he is a candidate for a sleep apnea machine and is working with those providers on that but admits he gets claustrophobic with such devices.  He was recently started on some low-dose trazodone which has been at least partially effective.  No change in appetite.  No change in cognition.    Patient describes his current mood is about 3 out of 10 with 1 being the worst is ever felt.  He states when he was in his early 20s with he had his worst mood.  He states the best he is better in the last 3 years has been at 3 or 4 out of 10.  He describes low motivation and low interest.  He admits that oftentimes when he feels this way he overspends some of his money but he denies having any other manic type behaviors.    We did spend quite a bit of time talking about the possible role of OT and speech in developing compensatory strategies and trying to maximize his level of functioning.  We also talked about the possible role of medication change.  He was interested and the eager to try both.  We talked about the risks and benefits of the medication and change including the possible risk of increased seizures.    Current  Medications:  Medications were personally reviewed at this meeting.  Please see the chart for current medication list.         Past medical History:  Patientis allergic to tegretol [carbamazepine].   has no past medical history on file.   has no past surgical history on file.      Past Psychiatric History:  Please see above.  The patient had a history of mood issues following the surgery and was in counseling throughout grade school and had been on Prozac which was started during his high school years.  The patient had neuropsych testing back in 2006 which showed strengths in many verbally-based domains including problem solving, attention to auditory information and performance on lower level executive tasks.  Scores in those domains were generally low average and above but he had some outlying high scores in the high to superior range.  He had significant intellectual deficits noted in memory, higher-level attentional abilities, higher level executive skills and problem solving based on visual information.    Patient does not believe that he is ever been on other psychotropic medications.  He was apparently started on Prozac as a sophomore in high school was on that a number of years.  He went about 2 or 3 years off the medication but was restarted 3 years ago.  He definitely believes it has been somewhat helpful but despite the medication his mood is had a decline as described above.  He is never had any suicide attempts but as stated above has threatened to harm himself but he states this is just because of frustration and he does not believe he did ever do it.  He is never had any tamiko.  He is never had any psychosis.  He denies having any side effects to the medications.      Substance Abuse History:   has no tobacco, alcohol, and drug history on file.  Patient denies having any history of alcohol use, illicit drug use or prescription medication misuse.  He is never been to detox and has never had a  CHARIS.      Family History:  family history is not on file.  Patient believes there may be multiple family members on his father's side who struggle with some depressive illness although they have not been diagnosed.      Social History:  Social History     Social History Narrative     Patient's parents are .  His mother is in [facility] and his father with whom he has very little contact is in [place].  He has 2 brothers.  One lives in [place] and one lives locally.  The patient has been at his current group home for 3 years.  Patient was diagnosed with Werner's syndrome back in childhood at age 3.  He had had significant seizures before his hemispherectomy.  He was educated at home but did go to his local elementary school to get special education classes.  He did repeat third grade.  He did much better after that surgery.  He apparently was placed back in school and attended [employer].  He did have several accommodations.     She has been living at [facility] for 3 years.  He does have his own apartment but has available 24 hour staffing to help him with cleaning or cooking or budgeting as well as transportation.  The patient's only requirements at this time is he let the staff know if he has taken his medications and check in once a day.  He has had a variety of service industry jobs over the years.  He believes his strengths are in the areas of interaction with the public and he certainly displays good social skills today.  Patient reports that he enjoys video games he does have friends whom he visits but has done less of that recently.  He is also stopped going to life groups at his [employer] which she was quite involved in.  He enjoys biking during the summer time.  He reports that he has had a variety of jobs in the service and industry in the past.  He recently was let go from a service job he admits because he was often tardy during periods of depression.             Review Of  Systems:  Patient has left-sided weakness which has been chronic since childhood.  No new issues or concerns.  Please see admit ROS.       Mental Status Exam:  Appearance: Patient was alert friendly and cooperative.  He was pleasant.  No pain or shortness of breath.  Behavior: She was cooperative.  He was able to initiate.  He was pleasant appreciative and polite.  No restlessness or agitation.  Speech: Strong verbal skills.  He was articulate and able to dialogue.  He was able to initiate.  No pressured or rambling quality.  Mood/Affect: Perhaps slightly slow but not obviously depressed.  No lability.  No irritability.  Attention and concentration: Patient was attentive with good concentration.  Thought Content: No hallucinations or delusions  Suicidal/Homicidal Ideation: None  Thought Process : Not loose.  Able to track and follow.  No racing thoughts.  Insight: Fair  Judgement: Fair  Memory: Fair  Gait: Please see above.  Patient does have left-sided weakness he is able to ambulate on his own.  Orientation: Grossly oriented      Recent Labs:  No results found for this or any previous visit (from the past 24 hour(s)).      Diagnosis:    Neurocognitive disorder secondary to Werner's disease    Major depressive disorder, recurrent, chronic, severe, without psychotic features        Plan:  I recommend that I follow the patient from a psychiatric standpoint.  I will assess for the appropriateness of possible psychotropic medication trials/changes.  At this time I am going to taper off the patient's Prozac.  We will decrease him to 30 mg a day for 1 week then 10 mg a day for a week and then discontinue.  We will start Effexor XR in 1 week at 75 mg a day.    I have referred the patient for neuropsychological testing.  His last test was 11 years ago.  I am hoping the results will help us tailor a treatment plan    I will refer the patient to outpatient ambulatory speech and OT to work on compensatory  strategies.    The patient will continue with his individual therapist.      The patient will seek out appropriate emergency services should that become necessary.  I will make myself available if any questions, concerns, or problems arise.       Thank you for allowing me to participate in the care of this patient.         Ren Manuel MD

## 2021-06-15 NOTE — PROGRESS NOTES
Speech Language/Pathology  Speech Therapy Outpatient Daily Visit Note    Nicholas Daugherty  YOB: 1990     [id number]    Session 3 of 7    Subjective  Patient presents as alert and cooperative during this session. Reports he begins a new job tomorrow. 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  An  was not applicable for this session. Group home staff present.   Patient reports no pain    Objective  Memory: Per assignment from previous session, patient reported implementing chart to track appointments he requires coordination for staff transportation. He completed 1/1 opportunities since initiating this. Staff reports, however, that while he did call to coordinate with staff, by the time of departure, patient was not ready. He reports he was distracted by another activity. Also per assignment, patient transferred from phone calendar to Google calendar to allow for synchronization between tablet and phone. Patient had input ~75% of appointments for the next week. Patient needed mod cues to input times for reminder alerts.     Cognition-linguistic:  Patient called to arrange transportation with [employer] for 2 appointments. Patient completed task with 100% accuracy, patient clarified with representative upon completion.       Assessment  Demonstrates carryover of information from previous session, completing/initiating both assigned tasks. Patient does not have consistent awareness of when he needs reminders to manage his schedule, which will likely be for each appointment/activity, especially given challenges with organization and time management.    Plan   Current goals remain appropriate. Patient's assignments for next session: 1) track number of successful calls to coordinate transportation with staff and 2) ensure all appointments are entered with alerts with sufficient time prior to scheduled appointment.    Time: 55 speech/language minutes    Andrey San MA, CCC-SLP

## 2021-06-15 NOTE — PROGRESS NOTES
Speech Language/Pathology  Speech Therapy Outpatient Daily Visit Note    Nicholas Daugherty  YOB: 1990     [id number]    Session 2 of 7    Subjective  Patient presents as motivated and engaged during this session.  An  was not applicable for this session. Staff person present.  Patient reports no pain    Objective  Reviewed plan of care.  Memory: Patient reports he has been entering appointments in his smart phone. Upon review of his calendar on his smart phone, patient had correctly entered 100% of appointments. He indicated he had alerts set for the previous day for each appointment. However, upon review, appointments did not have alerts set. Patient reports that a single alert the day before an appointment is sufficient to recall appointment.  Patient needed mod assist to consider how increase in number of activities could impact recall of events. Currently, staff calls patient in advance of any appointment for which he needs transportation. To address goal of increased independence, discussed with patient and staff re: patient initiating this coordination for transportation. Patient is to call staff at least 30 minutes prior to estimated time of departure for appointment to coordinate transportation with staff. Patient needed min cues to calculate time needed to call based on time of appointment and length of transportation time. Patient expressed desire to use his laptop in coordination with his smart phone. Patient needed mod assist to use separate application that allow this.     Assessment  Patient demonstrated good follow-through on implementing plan to enter all appointments into phone calendar and referring daily. Suspect this is adequate for his current demands, but as demands increase, as he hopes will occur with employment, this current system would be inadequate for his needs, as he has expressed that in the past, employment has led to overwhelming responsibilities. He was open to  adding alerts for the day-of appointments in preparation for increased responsibilities, but warrants monitoring of successful implementation. Staff has expressed willingness to explore and pass on strategies to aid in use of memory aids to progress to greater independence.     Plan   Current goals remain appropriate. For next session, patient is to: 1) transfer appointments from native phone calendar to Google calendar to allow for synchronization across devices. 2) Call staff at least 30 minutes prior to departure for appointments to coordinate transportation. If he has not called within 20 minutes of departure, staff to call patient. Patient is to track number of successful coordinations.     Time: 60 speech/language minutes    Andrey San MA, CCC-SLP

## 2021-06-15 NOTE — PROGRESS NOTES
.OUT PATIENT-CLINICAL SOCIAL WORK PROGRESS NOTE      1/2/2018           Nicholas Daugherty is a 27 y.o. male with  a TBI per medical record.  She was referred by his primary physician.  Patient has a legal guardian who is not here today but was aware of the appointment and supportive of his evaluation by the provider.  She was accompanied by group home staff member named Joanna.  She has been residing at the home for approximately 3 years.  He stated he believes that he can be more productive than he is.  He verbalized symptoms of depression although denies any suicidal or homicidal thoughts.      ASSESSMENT: Patient appeared pleasant and cooperative.  Reports that his primary concern are depressed mood and sleeping issues.  He did complete a sleep study and will be meeting with provider in the near future to discuss options to assist him with his sleep apnea per patient report.    PLAN: Information was given to patient and staff member regard to brain injury support services.  We will follow-up with patient after next visit.    Type of Service: Office Visit    Services Provided: Resources    Resources Provided: Community Programs     Janey PENA  1/2/18  1300

## 2021-06-15 NOTE — PROGRESS NOTES
Occupational Therapy  OT General Progress Note    Medicare Insurance    Units: 4 ADL  Total Minutes: 60  Treatment #: 2/6    ASSESSMENT  Summary & Analysis of treatment: Pt arrived with assisted living staff member, very pleasant and fully engaged in treatment session. With brief review of care plan, pt agreed he wanted to address financial organization and general management of his finances at today's session. We discussed and listed all weekly and monthly average expenses as well as average monthly income (including Social Security and projected income from his job). According to this exercise, pt recognized that he is spending more money than he will be bringing in. We discussed where he can cut back on spending and tools he can use to track expenses on a daily basis for the purpose of organizing his spending and developing an active awareness of how he is spending his money. We looked at a variety of electronic tools he can download onto his computer to use as a tracking system but given that he will not start his job for another 1-2 weeks and will have very limited spending money, he deferred downloading a tracking log until a later date. At the end of the session, pt initiated that he would like information on adaptive cooking utensils. He commented that he has a difficult time managing opening cans and cutting foods and would like to be more independent with this. See goals added to POC.     See initial evaluation for goals and POC. Progress toward goals: Improved    PLAN  Treatment time: Self-care / ADL training 60 minutes    Plan: 1.Continue per plan of care. and 2.Patient progressing towards goals.     Long-term goals to be met by discharge date (6 sessions):  1. Patient will report using a budget to demonstrate greater independence with financial management.  2. Patient will create an organizational system for paying pills to compensate for memory difficulty.  3. Patient will demonstrate paying bills  "correctly 100% of time with the use of compensatory strategies to increase independence.  4. Patient will demonstrate appropriate use of cooking utensil AE for increased independence and safety with meal preparation.- NEW GOAL    Recommendations: Discuss if it is the right time to download an electronic expense tracking form on his computer, practice check writing and checkbook register, consider cash only method of spending money management, adaptive cooking utensils (can opener, cutting board, rocker knife, etc)    SUBJECTIVE  Pain: No      OBJECTIVE  - Discussed average weekly and monthly expenditures, calculating a total for average monthly expenses  - Discussed average monthly income, calculating total for average monthly income  - Entered all information into pt's laptop to save as a reference  - Discussed specific areas of expenses pt can work on cutting back on over the next month (games, movies and eating out) to decrease the $200/month overspending  - Researched online expense tracking tools/budget worksheets to download in the future  - Discussed items to include in a \"future/hopeful\" budget that are not currently regular expenditures: gym membership, Netflix membership, gifts for family, sporting events    Carmina Guzman, OT      "

## 2021-06-15 NOTE — PROGRESS NOTES
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 He received special education classes  In elementary school and attended public high school with several accommodations. He currently lives in his own apartment but has available 24 hour staffing to assist him with cleaning, cooking, budgeting and transportation. 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 He participated in neuropsych testing in 2006 with results of strengths primary verbal areas including problem solving, attention to auditory information and performance on lower level executive function tasks. Areas of deficit included memory, higher level attentional abilities, higher level executive function and problem solving based on visual information.  Patient presents as motivated and engaged during the session.  An  was not applicable. He was accompanied by a staff member from his group home.  Patient reports no pain    OBJECTIVE  Speech: Oral motor function was not impaired. Motor speech was not impaired. Speech intelligibility was approximately 100 % at the conversational level. Voice was not impaired.    Language/Cognition: Reports difficulty with follow-through, keeping track of things, wants to be more independent but organizational skills \"are off\". Examples provided include: dealing with money r/t budgeting, following it; reports missing appointments, missing smaller details, especially as responsibilities increased. Has lost jobs as " a result. Reports poor time management. Reports he has not attempted any strategies, with exception of this past week.     Cognitive-Linguistic Quick Test completed  Results are as follows:  Domain   Score  Severity      Severity                 rating  Attention  198  4                    WNL    Memory  140  2                  mod    Executive function 32  4                    WNL    Language  32  4                    WNL    Visuospatial skills 88  4                    WNL    Clock drawing  13  4                    WNL       Overall   3.6/4.0  WNL      Patient required increased time on several tasks, and noted to have self-corrections. Suspect patient would have more difficulty in executive function, visual spatial skills if level of task complexity was increased.     Written expression: Not impaired. Patient wrote name, single words to dictation (with self-correction x1) and sentences and paragraphs generated with 100% accuracy.     Reading comprehension: Not impaired at the basic level. Answered questions re: sentence level material with 100% accuracy.       Swallow: Regular with thin liquids.     ASSESSMENT  Patient presents with memory.  Patient participated in education regarding evaluation results, treatment plan/rationale and expected functional outcome and verbalized understanding..  Rehab potential is good based on prior level of function, evaluation results and motivation and cooperation.    PLAN  Speech therapy 6 times per week for 6 weeks  Ongoing communication with referral Source, patient, caregiver and treatment Team  Ongoing patient/ family instruction regarding evaluation results, treatment plan/rationale and expected functional outcome    Long term goals: Patient will improve memory and organization skills to be independent for daily functioning.  Short term goals:   1) Patient will use compensatory strategies to recall daily details 90% of the time per patient report and therapist audit.  2)  Patient with improve organizational skills for moderate level tasks to structure his day 90% of the time with min assist    Time: 60 speech/language minutes    Andrey San MA, CCC-SLP    Physician Recommendation:  1. I certify the need for these services furnished within this jplan and while under my care.  I agree with the therapist's recommendtion for plan of care.  2. If there is any recommendation for modification of therapy plan, please indicate below.    MEDICARE PATIENTS:  [facility] # [id number]  Provider #   Certification Dates: from 1/10/18 to 4/10/18

## 2021-06-15 NOTE — PROGRESS NOTES
Occupational Therapy  Occupational Therapy Outpatient Brain Injury Evaluation    Medicare Insurance    Units: Herve Montes  Total Minutes: 65 minutes    Medical Diagnosis: Neurocognitive deficits  Treatment Diagnosis: Cognitive Impairment 294.9  Referring Practitioner: Ren Manuel MD  Date of referral: 1/2/2018  Start of care: January 10, 2018    ASSESSMENT  Pt presented to therapy with his assisted living staff member. Pt appeared well groomed and alert. He was conversational and pleasant throughout the session. Per testing results, he demonstrated mild cognitive impairment on the [place] with greatest difficulty in memory. He also demonstrated normal immediate recall and suspect deficits in delayed recall on the contextual memory test. He was able to complete the organizational activity with 1/15 errors and was able to correct the single error after one cue. Overall, patient's largest complaint is financial management and independence. Patient's perceived performance may be different than the staff member's perceived performance on financial management; however, both are in agreement that greater independence is preferred. Patient is appropriate for skilled OT services to provide techniques to assist with financial management. Although memory appears to be an area of concern, speech therapy will plan on addressing memory compensation techniques during follow-up sessions.    Recommendations: Patient is appropriate for 1:1 skilled OT at this time.    PLAN  Frequency/Duration: 1 times per week, for 6 weeks; Up to 6 visits    Long-term goals to be met by discharge date (6 sessions):  1. Patient will report using a budget to demonstrate greater independence with financial management.  2. Patient will create an organizational system for paying pills to compensate for memory difficulty.  3. Patient will demonstrate paying bills correctly 100% of time with the use of compensatory strategies to increase independence.    Plan  "of Care: Self Cares / ADL Training, Communications with referral Source, patient, caregiver and treatment Team, Patient/ Family instruction: Etiology of diagnosis or presented symtoms, Treatment plan/rationale, Home Exercise Program and Expected Functional Outcomes , Cognitive Training and Compensatory Strategies  Prognosis to achieve goals: Good    Goals were established with this patient: Yes    SUBJECTIVE  Pain: No    Past Medical History: Pt has a history of complete right hemispherectomy after third grade after being diagnosed with Werner's syndrom with seizures, vomiting and eye twitching. 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    Patient presented with symptoms of: memory impairments and organizational deficits     Patient's goals for therapy are to improve his independence with financial management.    Nicholas Daugherty lives an apartment with 24 hour staff. Staff assist with driving and Pt also takes [employer] as needed. Pt goes to the office each week to fill his medication. He is supposed to text when he takes the pills in the morning and at night. Pt receives assistance from the staff to write out small bills and also has a rep payee for larger bills. This may change due to rules with the rep payee provider. Staff member present reported patient needs assistance for what information goes in which section of the checks. Pt would like to be more independent. Pt has assistance with opening some containers with cooking due to physical limitations with LUE. Pt receives assistance sometimes with cleaning and laundry. He feels that his apartment is fairly clean most of the time. Pt needs assistance with heavy lifting when cleaning. Pt's staff take him grocery shopping due to driving assistance. Pt has had several jobs but feels like he has not found his \"nitch\". 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 Pt currently has a  to assist with finding another job. Pt's largest complaints are poor organization and memory.    OBJECTIVE  Cognitive Assessment and Results    CMT:   Immediate recall:   Raw: 15/30  Standard: 172=WNL  Delayed:  Raw: 12/30  Standard: 151=Suspect    MOCA: 25/30 (26 and above is considered WNL)    Organization worsheet: 14/15 correct    Physical Assessment and Results  Hand dominance: Right    No functional movement of LUE per patient's report.    MEDICARE PATIENT: Yes: [facility] #[id number]; Provider # ; Certification Dates: from 1/10/18 to 4/17/18     Marlena Montez, RHEAR/L, CLT, 1/10/2018    Physician Recommendation:  1. I certify the need for these services furnished within this plan and while under my care. I agree with the therapist's recommendation for plan of care.  2. If there is any recommendation for modification of therapy plan, please indicate below.

## 2021-06-15 NOTE — PROGRESS NOTES
NEUROPSYCHOLOGICAL CONSULTATION    NAME: Nicholas Daugherty  YOB: 1990     DATE OF EVALUATION: 1/4/2018    REASON FOR REFERRAL   Mr. 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 His last neuropsychological evaluation was completed on 10/16/2006 at which time he was seen by Fred Belcher, PhD at the [facility]. At that time, Mr. 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 More recently on 1/2/18, Mr. Daugherty was referred for a follow-up neurocognitive evaluation by Dr. Ren Manuel MD in psychiatry to assist in treatment planning and coordination of outpatient therapies. Mr. Daugherty was accompanied to today s appointment by a staff member of his independent living community, Joanna. Joanna remained mostly quiet during the clinical interview but occasionally assisted in providing details and insights about Mr. Daugherty s history and current level of functioning.   HISTORY OF PRESENT ILLNESS AND CLINICAL INTERVIEW:  In brief, Mr. Daugherty developed symptoms of Werner s encephalitis at age 3 and was eventually diagnosed with the condition at approximately 4 and half years of age. Per his report today, he experienced 100+ generalized tonic-clonic seizures per day ( every minute or two ) but it is unclear as to whether he is describing an episode of status epilepticus at the time of the acute stage of the condition.  Mr. Daugherty noted that very frequent seizures persisted from age 3 to 7. His seizures were severe enough to impede his school attendance, learning, and interacting with others according to a report made by his mother to Dr. Belcher on 10/11/2006.  During that time period he also developed left hemiparesis.  After exhausting standard  medical and therapeutic anti-epileptic treatment options, Mr. Daugherty was taken to [facility] in [place] where he underwent a complete right hemispherectomy (1997). After surgery he was able to talk but relearned to walk and complete other physical tasks over the course of a hospitalization which reportedly lasted  several months.  His seizures completely desisted as a result of the surgery. More recently, however, he has been suspected to have been experiencing absence seizure, which he estimates began and were diagnosed  a couple of years ago.   He was prescribed an anti-epileptic medication (Keppra) and believes he has been seizure-free for 6 months.   Today, Mr. Daugherty reported difficulties with new learning and memory. In particular, he becomes overwhelmed and frustrated when training for new jobs, utilizing public transportation, and managing his appointments. His independent living facility staff worker, Joanna, reported that he sometimes forgets conversations, rules, or appointments, and that he often misplaces important items. Both Joanna and Mr. Daugherty agreed that Mr. Daugherty generally needs more repetition to learn new information than his peers. For example, Mr. Daugherty recently received  bus training  to learn to utilize new routes, but after two accompanied practice runs was still unable to master/recall the route. Mr. Daugherty denied any difficulties with remembering names of people or with word finding. He feels that his day-to-day  short term memory  is better than is  long term memory.  He benefits from reminders about appointments the day before the appointment, but does not benefit from reminders several days out. Mr. Daugherty reported that his attention and thinking speed have declined since high school. He feels that his thoughts have slowed down, especially when he is attempting to learn something new. He has struggled to successfully train for jobs because he feels he was not given adequate  opportunity to learn and that the training did not match his  hands-on  learning style. He has difficulty concentrating when reading (though he used to be an avid reader in middle school) and does not retain what he reads.   With regard to activities of daily living, Mr. Daugherty has a legal guardian (his mother) and representative payee ([employer]). He has resided in an independent apartment with supervision from on-site staff at [employer] for the past 3 years. He receives his medications from the staff office on a weekly basis, fills his pill box, and checks in with a staff member (via text message) once a day when he takes his medications. Regarding finances, Christa Valle is currently Mr. Daugherty s representative payee for his social security income, which is automatically put towards his rent each month. He is required to pay the remainder of his rent via check to [employer]. He is additionally required to submit receipts for his purchases (such as fast food, video games, etc.) but often fails to do so. Lately, he has struggled financially, as he was let go from his job on December 28th, 2017. Joanna reported that Mr. Daugherty has been encouraged to begin utilizing the more supportive vocational services options from [employer], but that he has been reluctant to do so. Today Mr. Daugherty acknowledged that supported employment teams and/or the in-house employment option (rather than independent employment) may be necessary in the short term so that he can pay his bills. Currently, finances and financial management are a major source of stress for Mr. Daugherty and a major source of conflict and frustration between Mr. Daugherty and [employer] staff. Regarding food preparation, Mr. Daugherty does his own cooking, but he struggles with physical limitations which make cooking difficult. He often utilizes the food shelf when he cannot pay for groceries. He does not drive. He has  historically taken [employer] but he is suspended/on probation for repeated no call/no shows. He is uncomfortable taking the city bus to new locations but is able to take the bus if he is familiar with the route. He articulated today that he wants to be as independent has he possibly can be but admitted that he  needs help  and must  take baby steps  to actualize his potential.   MEDICAL HISTORY:  Mr. Daugherty s medical history is significant for Werner s encephalitis (right hemisphere) with associated seizures, left hemiparesis, and complete hemispherectomy, detailed above. 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 Mr. Daugherty is able to ambulate independently. He recently was diagnosed with absence seizures, which have been well controlled on Keppra for the last 6 months. There is no other history of chronic medical conditions. There is no history of head injury or loss of consciousness. There is no recent history of hospitalizations or surgeries.   MEDICATIONS:    Mr. Willow avila medications (per medical record) include:      bacitracin zinc 500 unit/gram Pack     carbamide peroxide (DEBROX) 6.5 % otic solution     dextromethorphan-guaiFENesin (ROBITUSSIN-DM)  mg/5 mL liquid     docusate sodium (COLACE) 100 MG capsule     ferrous sulfate 324 mg (65 mg iron) TbEC     FLUoxetine (PROZAC) 10 MG capsule     hydrocortisone 1 % cream     ibuprofen (ADVIL,MOTRIN) 200 MG tablet     levETIRAcetam (KEPPRA) 750 MG tablet     loperamide (IMODIUM) 2 mg capsule     psyllium with dextrose (METAMUCIL JAR) powder     traZODone (DESYREL) 50 MG tablet     venlafaxine (EFFEXOR XR) 75 MG 24 hr capsule     PSYCHIATRIC HISTORY:  Despite his uncontrolled seizures and health status in early childhood, Mr. Daugherty denied any symptoms of depression or anxiety in childhood.  He did receive individual counseling in grade school after his hemispherectomy and attended family therapy after his parents   separation. Mr. Daugherty felt that high school was a supportive and social environment where he was given adequate structure and the opportunity to reach his potential.  It wasn t until he graduated from high school that he began to experience major depressive episodes with intervening periods of dysthymia.  Mr. Daugherty struggled to adapt to the lack of structure during his post-high school years. He described becoming severely depressed and withdrawn. There were long periods of time when he would  shut himself out  from the world. At one point, his door had to be broken down because he refused to leave his apartment. In between episodes of severe depression, Mr. Daugherty reported that he experiences a low level of depressed mood. He has taken anti-depressant medication (Prozac) for the past three years and his mood has been stable.    Mr. Daugherty reported feeling more depressed in recent months. He endorsed low mood, low energy, and social withdrawal.  He denied suicidal ideation or plans to harm himself, although Joanna described recurring comments Mr. Daugherty makes to staff members when frustrated such as,  Why don t you shoot me in the head?  and  Why don t you just kill me now?  For the past few months, he has seen a counselor from the [facility]. Mr. Daugherty said he tries to see her in his home once a week but that scheduling has been difficult due to the holidays. He was seen by Ren Manuel MD on 1/2/2018, who diagnosed him with major depressive disorder, recurrent, chronic, severe, without psychotic features. He recommended Mr. Daugherty taper off of his Prozac (30mg/day for 1 week, 10mg/day for 1 week, then discontinue) and start Effexor XR in 1 week at 75mg/day.  Regarding the vegetative symptoms of depression, Mr. Daugherty reported low energy and sluggishness. He estimated he sleeps roughly 10 hours a night and naps occasionally (but not regularly) in the afternoon. He recently completed a sleep study which  confirmed he has obstructive sleep apnea, and he plans to follow up regarding treatment later this month. He reported that his appetite is  good.  Regarding coping strategies, Mr. Daugherty finds it is helpful for him to  get out and do things.  Lately, he has been trying to fill out job applications to help mitigate some of his stress and low mood. He also occasionally walks across the street to [place] to visit his former coworkers. Unfortunately, lately he has not been socializing or engaging in his usual activities, such as attending Orthodoxy groups.   SUBSTANCE USE HISTORY:  There was no reported substance use history.   SOCIAL HISTORY:  Mr. Daugherty was born and raised in [place]. He lived with his parents and two younger brothers until his parents  and eventually  when he was in 6th grade. He then lived with his mother and younger brother and eventually with his step-father until he was 19. Despite suffering from severe seizures and progressive left hemiparesis from ages [age], he was mainstream schooled (with some involvement in special education). A PCA assisted him at school and at home.  Mr. Daugherty reported that during this period he was frequently absent from school. He denied learning disabilities or any formal diagnoses of intellectual disability and described himself as an average student. After his hemispherectomy at age 7, he was homeschooled by his mother from grades 3 - 6. (3rd grade was repeated). He attended [employer] for 7th grade, then [employer] for 8th and 9th grades. When his mother remarried, he moved to [place] and completed grades 10 through 12 there. Mr. Daugherty enjoyed the opportunities at [place] such as Special Olympics, adaptive equipment, and accommodations including one on one testing. Again he attended mainstream classes but with accommodations for his cognitive and physical disabilities.   After high school he lived with his mother and  stepfather in [place] for 2 years before moving out to an independent living program for 1-2 years. In retrospect, Mr. Daugherty feels he was not  mentally ready  to live on his own. He recounted feelings of isolation, loneliness, and depression from that time period. He worked at [employer] and a [employer] but eventually lost those jobs and became severely depressed. He was moved in with his grandmother for a year and a half.  When he began to feel better, he moved out once again to an independent living facility, this time in [place]. Once there, he shut himself out from the world again. He lived there for 1 year before moving to a group home. He reported that he did better in the group home, which he attributed to greater social interaction and steady employment (at the [employer]). He has been living in his own apartment at [facility] for the past 3 years. He has struggled there to get along with many of his peers (according to Joanna because he looks down on their relatively more severe disabilities) and staff members (because he breaks rules and desires greater independence). Additionally, he has struggled to maintain employment. 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 He is currently seeking employment and planning to meet with a  in the near future. As mentioned above, he is considering taking advantage of the supervised employment opportunities at [employer].  BEHAVIORAL OBSERVATIONS:  Mr. Daugherty arrived on time and accompanied by Joanna, a staff member at his living facility, to today s appointment.  Mr. Daugherty was casually dressed and appropriately groomed. He was hemiparetic but ambulated independently. Rapport was established and eye contact was unremarkable. He was alert and engaged throughout the interview.  Rate and prosody of speech were  grossly normal.  No word finding difficulties were observed, but he did commit some grammatical errors and word reversals. His mood was dysthymic and his affect was restricted, consistent with his reported depression. He was a detailed and reliable historian. There was no evidence of a rosalai thought disorder; no hallucinations or delusions were apparent.  Judgment and insight appeared fair.   Mr. Daugherty appeared adequately motivated in the testing component of the evaluation, although in general his attitude was negative and frustrated. His performance was fully intact on measures of objective effort. He attempted all tasks presented to him and worked at a steady pace. In response to difficult or challenging items, Mr. Daugherty alternatively sighed, hit himself in the head aggressively, and expressed his frustration with the tests and the examiner (e.g.,  You re so cruel, what is this even testing? ). No significant barriers to testing were observed and the following is judged to be a valid representation of his current neurocognitive strengths and weaknesses.  SERVICES & TESTS ADMINISTERED:   Pertinent information was obtained by reviewing the electronic medical record, a previous neuropsychological evaluation (2006), as well as through an individual interview conducted with the patient. With the assistance of a graduate practicum student, I selected integrated and interpreted the tests, and generated this report. The graduate practicum student administered the neuropsychological tests. The test battery included Wechsler Adult Intelligence Scale-IV, Wechsler Memory Test-IV (select subtests), Trailmaking Test, , [place] Naming Test-2, [place] Card Sorting Test-1 deck, Jem-Osterrieth Complex Figure Test, Nikki Oh Executive Function System (select subtests), Aguilar Facial Recognition Test, Asencio Visual Organization Test, Aguilar Judgment of Line Orientation (odds), [place] Verbal Learning Test II, and the Symptom  Checklist 90-R.  For diagnostic and coding purposes, he has a history of epilepsy and Werner s encephalitis, and was referred for an evaluation of mild neurocognitive disorder. Today s evaluation consisted of 1 unit of 51610 and 6 units of 88621.    DESCRIPTIVE PERFORMANCE KEY:  Scores at the 9th percentile and above are generally considered within normal limits:  Superior scores:         91st percentile and above  High Average scores:                      75th through 90th percentile  Average scores:                     25th through 74th percentile  Low Average scores:                10th through 24th percentile     Scores that fall at the 9th percentile are considered borderline     Scores that fall at the 8th percentile and below are considered a degree of impairment:  Mildly Impaired:    3rd through 8th percentile  Moderately Impaired:    1st through 2nd percentile  Severely Impaired:    <1st percentile  ESTIMATED PREMORBID ABILITIES:   Due to the developmental nature of Mr. Daugherty s condition (onset age 3), it was neither possible nor appropriate to estimate premorbid intellectual abilities.   NEUROBEHAVIORAL EXAMINATION:  A brief neurobehavioral examination was conducted by the neuropsychologist.  Visual field testing was consistent with expectation and revealed a full left homonymous hemianopia.  On a test of tactile extinction, he responded appropriately to unilateral stimulation on the left and the right. Although he appeared to exhibit left suppression during the initial trials of simultaneous bilateral sensory stimulation, he responded appropriately (bilaterally to simultaneous stimulation) as the task progressed.  He was unable to complete the Luria task with his left hand secondary to hemiparesis.  He was unable to execute the three step sequence with his dominant/right hand independently or simultaneously with the examiner; however, he was able to do so with effort when verbal mediation was provided.   He struggled to execute the go/no-go task without error; perseverations and reduced inhibitory control were noted.  TEST RESULTS:  Auditory attention and working memory performances were low average to borderline impaired overall on formal testing. Specifically, Mr. Daugherty performed in the lower end of the average range on a measure of rote recall of a series of digits presented auditorily (span of 6), in the lower end of the average range on a test requiring his to mentally manipulate and reverse those digits (span of 4), and in the mildly impaired range on a measure of working memory that required his to sequence the digits (span of 4). On a measure of mental arithmetic, Mr. Daugherty performed in the low average range and required some repetitions of problems presented auditorily.   Cognitive speed and processing accuracy were generally impaired across tasks and were likely impacted by impairments in visual scanning related to his field cut. Mr. Daugherty s performance on a speeded measure of visual scanning fell into the moderately impaired range, and he committed two errors. His performance on a task requiring him to rapidly decode a series of digits into symbols using a key fell into the mildly impaired range, and again he committed two errors. While his performance was moderately to severely impaired across measures of basic visual scanning, sequencing, and set-shifting, it improved to the average range on a test of motor speed requiring him to connect a series of circles by following a traceable pattern (i.e., a more structured task that didn t rely on visual search/scanning).     Visual perceptual abilities were variable across tasks. Specifically, Mr. Daugherty exhibited a moderately impaired performance on a measure of visual analysis and synthesis of a series of geometric designs and mildly impaired visual reasoning and mental manipulation.  He exhibited a borderline impaired performance on a timed test requiring him to  re-create a series of 2-dimensional configurations via 3-dimensional block arrays. He employed a stimulus-bound and disorganized approach to copying a complex figure, and his overall score was severely impaired. He exhibited mild impairment on a test of visual integration of abstract/geometric shapes and mild impairment on a test of visual integration and organization of parts into a recognizable object. He exhibited moderate impairment on a test of facial recognition. His performance was low average on a measure of spatial location. He exhibited low average performance on a measure of design fluency which required him to rapidly generate designs by connecting a series of dots. He exhibited an average performance on a measure of design fluency and response inhibition which required him to rapidly generate designs by connecting a series of dots while ignoring irrelevant dots. He exhibited a low average performance on a measure of design fluency and mental flexibility which required him to rapidly generate designs by connecting a series of dots in an alternating fashion.  With regard to language, Mr. Daugherty s comprehension was fully intact. His performance was average on measures of verbal abstraction and vocabulary knowledge, but it was borderline impaired on a measure of acquired general knowledge that his heavily correlated with an individual s educational experiences.  Phonemic and semantic verbal fluency performances were high average and average, respectively. His performance fell in the low average range on a test requiring him to generate words while alternating between two categories. Confrontation naming was severely impaired, and he benefitted significantly when provided with phonemic cues, suggesting a retrieval deficit.   With regard to learning and memory, Mr. Daugherty exhibited an average performance overall on a test requiring his to learn a series of 16 words over 5 learning trials (raw score recall = 8,  10, 12, 15, 13). He accurately recalled 4 of 16 words on an interference trial (mildly impaired) and his performance remained in the average range following a short and long delay (recalls of 12 words and 12 words). He accurately recognized 16 of 16 target words but also committed a high number of false positive errors (17 errors). His performance was moderately impaired on a contextual verbal learning test, and he retained and recalled 75% of the previously learned details over a long delay (lower end of the average range). His recognition memory was moderately impaired; he correctly answered 16 out of 30 yes/no questions regarding the content of the stories (little better than chance). Visual learning abilities were low average, and he retained and recalled only 41% of the previously learned information over a delay (low average). He correctly recognized 4/7 designs after a delay (mildly to moderately impaired).   Nonverbal problem solving abilities were severely impaired. Mr. Daugherty failed to catch on to a measure of nonverbal problem solving that required him to generate and flexibly alternate between card sorting strategies based on the feedback that he received from the examiner. He remarked that he found the task confusing and frustrating. He committed a high number of perseverative and non-perseverative errors, and he failed to reach a solution.   Mr. Daugherty endorsed clinically significant symptoms of depression (SCL-90 DEP, T = 71), and Interpersonal Sensitivity (SCL-90 I-S, T = 61).   COMPARISON TO PREVIOUS NEUROPSYCHOMETRIC TESTING:  Mr. Daugherty underwent a neuropsychological evaluation with Fred Belcher, PhD at the [facility] at the age of 16, split between two sessions on 10/11/206 and 10/16/2006. Although comparisons are difficult across the two evaluations due to differences in the test batteries (i.e., pediatric vs. adult test), there is a consistency in the overall profile. Specifically, at  the time of that evaluation, Dr. Belcher indicated that Mr. Daugherty exhibited areas of significant deficit, including differentially lower visuospatial than verbal problem solving, significant deficits in memory for both visual and auditory information, profound deficits in attention, and a significant deficit in higher level executive skills, particularly in those tasks involving mental flexibility. Overall, Mr. Daugherty s Full Scale IQ was found to be in the lower end of the borderline impaired range. His performances today were largely stable and in line with the results of previous evaluation.     SUMMARY OF RESULTS:  Mr. 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 His last neuropsychological evaluation was completed by Fred Belcher, PhD at the [facility] on 10/16/2006.  At that time, Mr. 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  More recently on 1/2/18, Mr. Daugherty was referred for a follow-up neurocognitive evaluation by Dr. Ren Manuel MD in psychiatry to assist in treatment planning and coordination of outpatient therapies.     Overall, the results of today's evaluation indicate that Mr. 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 While Mr. Daugherty did exhibit difficulties with word finding to confrontation on formal exam, his expressive language abilities, thought formulation, and social (pragmatic) speech were grossly intact. He performed in the average range on a measure of acquired vocabulary; however, he struggled somewhat and performed in the borderline impaired range on a measure of general acquired knowledge that  his highly correlated with early educational experiences. Today,  Mr. Daugherty presented as a forthcoming and good historian, was organized in recounting the details of his history, and no clear cognitive impairments were noted in casual conversation. While he acknowledged the presence of his left visual field cut (homonymous hemianopia) and hemiparesis, he tended to minimize and lack full awareness of his cognitive inefficiencies. Rather, he focused primarily on how his mood symptoms are interfering with his initiative, motivation and drive and undermining his ability to maintain gainful community-based employment.    It is worth noting that Mr. Daugherty s visual impairment and hemiparesis likely undermined his performance on tasks requiring visual scanning and visuomotor speed, and as expected, his processing speed improved to the low average/average range on speeded tasks that were not dependent on those skills/capabilities.  Mr. Daugherty did exhibit a pattern of moderate to severe impairment on measures of visual scanning and motor speed.   His performances on measures of verbal fluency were average to high average when he was provided with phonemic and semantic cues, but declined to the low average range when the task also required mental flexibility and set-shifting.   Visual generative fluency (i.e., design fluency) fell at the low end of the average range.  Learning and memory performances were somewhat variable.  Mr. Daugherty exhibited average learning and delayed recall of a rote 16-item word list. He benefited considerably from repetition (5 exposures to the material) and retained and recalled an average number of words over a long delay.  His performance was notable for difficulties discriminating correct/incorrect responses on recognition format tests. On a more verbal learning task that required him to learn and recall more complex and detailed information (without repeat exposure to the material), Mr. Daugherty s  performance fell to the moderately impaired range, though he retained and recalled most of what he learned (75%, average). Furthermore, visual learning and memory abilities were in the mildly impaired to low average range, and he did not benefit from recognition format testing. This pattern of results suggests a relative strength in verbal over visual learning and memory.  Mr. Daugherty also benefited when [id number] information was broken down into smaller component parts and when he was provided with repetition.      Mr. Daugherty struggled throughout the testing battery with self-monitoring and mental flexibility. For example, although his performance on a measure of semantic fluency was average, he fell to the low average range when he was required to alternate between two categories. Likewise, it took Mr. Daugherty more than three times as long to switch between connecting letters and numbers in a visual array than to connect letters alone. On a measure of nonverbal problem solving and mental flexibility, Mr. Daugherty was inflexible and unable to incorporate feedback from the examiner to adjust his strategy. Finally, Mr. Daugherty endorsed clinically significant symptoms of Depression and Interpersonal Sensitivity.     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   Specifically, his pattern of performance on testing is largely consistent with that observed at the time of his 2006 evaluation suggesting stability of his cognitive deficits despite the recent onset of absence seizures.    Mr. Daugherty exhibits executive dysfunction and  lateralized deficits in a number of cognitive domains that are typically ascribed to the right hemisphere (i.e., visuospatial perception, reasoning, problem solving, processing speed). Given Mr. Daugherty s young age at the onset of his condition (Werner s Encephalitis at age 3), some degree of reorganization of cognitive skills predestined to the right hemisphere may have occurred; however, moderate deficits remain and will continue to impact Mr. Daugherty s functioning in daily life.   Mr. Daugherty acknowledged a recent exacerbation of his depression with social withdrawal. While mood symptoms may be exacerbating his functional difficulties and contributing to declines in his participation in the community, they certainly do not fully account for his difficulties on cognitive testing.   RECOMMENDATIONS:  Given the results of today s evaluation, the following recommendations are made:    1. Given Mr. Daugherty s cognitive impairments on neuropsychometric testing, he would be a good candidate for outpatient occupational and speech therapy to assist in developing compensatory strategies to optimize his functioning in daily life.    2. At this point in time, Mr. Daugherty does exhibit a degree of anosagnosia, or limited awareness of his cognitive deficits and how they impact his day-to-day life.  We will address this during our feedback session and education will be provided; however, I suspect that the important people in Mr. Daugherty s life will need to continuously have conversations with him about his cognitive strengths and weaknesses and how they would be expected to impact his day-to-day life.  Given his degree of cognitive impairment:  a. Mr. Daugherty does not appear capable of utilizing public transportation independently. His impairments with problem solving, spatial reasoning, and executive and social functioning constitute a safety risk.  The unpredictability of his mood and behavior (e.g., verbal aggression and outbursts) pose a  particular risk when interacting with individuals outside of his known community. If possible, he should take steps to regain access to [employer]. Otherwise, he will need to be accompanied on outings when using public transportation.  b. Mr. Daugherty will require assistance with meal preparation. Given that Mr. Daugherty s physical limitations impact his ability to cook, he may benefit from acquiring and learning how to use adaptive equipment in the kitchen. Additionally, due to financial strains, he often utilizes a local food shelf. He may wish to consider signing up for food assistance programs such as Meals on Wheels to help alleviate some of the stress associated with obtaining food.   c. Mr. Daugherty will require a representative payee to likely take over management of his finances and personal affairs.    3.  From a vocational perspective, it is also important for Mr. Daugherty to understand his limitations, particularly with regard to occupational endeavors. Historically he has gotten  in over his head  in new occupational roles, causing his termination and subsequent frustration and depression. Avoiding placing himself in situations where he is unlikely to succeed may prevent future setbacks with regard to mood and self-confidence.   a. Mr. Daugherty s relatively strong verbal reasoning and conversational skills have served him in securing demanding and challenging jobs in the past. His impairments in perceptual reasoning, problem solving, executive functioning, mental flexibility/multitasking, learning, and memory, however, have contributed to his sense of overwhelm and frustration once he begins in new roles.   b. Mr. Daugherty is encouraged to work closely with a vocational counselor who understands his challenges and can help Mr. Daugherty find a job where he is more likely to thrive.  In particular, he will do best in jobs that allow for repetition and hands on learning, require the execution rote tasks, and place limited time  demands and pressures upon him. Mr. Daugherty reported that he finds jobs that allow him to be social and talk to people to be the most appealing and fitting for him, and thus he may not be suited for a sheltered workshop setting.     4. Mr. Daugherty endorsed a clinically significant level of depression, both in the interview and on a self-report measure which indicated that he is experiencing symptoms of low energy, loneliness, hopelessness, and disturbed sleep.  Specifically, Mr. Daugherty described a tendency to become more depressed once he falls out of a healthy routine (which typically involves work and participating in community events/activities) that allows him opportunities to interact socially with others.  Intervening early once Mr. Daugherty s routine is disrupted is important. He recently lost his job and distanced himself from peers and his social group and we will plan to talk with staff from his group home (at the time of the feedback session) about this pattern and how to work closely with him to identify opportunities and reinforce his participation in social and other pleasurable activities.  In addition, it will continue to be important for him to utilize mental health resources, including individual psychotherapy with his therapist at [facility] and psychopharmacological intervention via regular follow-up with Dr. Manuel. Consideration should be given to increasing the frequency of therapy to at least 1x/week.      5. In addition, consideration may be given to having an applied behavior analyst assist (EMIR) in the coordination of Mr. Daugherty s care.  Mr. Daugherty and Joanna described instances when Mr. Daugherty has become frustrated and has become verbally aggressive towards staff members at [employer]. Work with his behavior analyst could focus on identifying the antecedents of his problematic behaviors (particularly around peers), teaching him concrete relaxation skills or strategies to  prevent the unwanted behavior or outburst, and modifying his routine or environment to decrease the likelihood of these events.    6. Medically, Mr. Daugherty described a pattern of disturbed sleep. He should follow up regarding the results of his sleep evaluation and consider intervention, such as use of a CPAP, if necessary. He may also consult with his doctor about techniques for improving sleep quality, such as consistent bedtimes, regular exercise, and limited caffeine intake.     Because Mr. Daugherty s surgery was approximately 20 years ago, and his condition is stable, neuropsychological re-evaluation need not be repeated unless there is a change in his medical history or level of functioning.     Thank you for allowing us to assist in Mr. Daugherty s care. Please contact Dr. Ma with any questions regarding the content of this report ([phone])    Suzie Sy MA  Neuropsychology Extern    Cayla Ma, PhD, LP, ABPP  Clinical Neuropsychologist, LP#[id number]  Board Certified in Clinical Neuropsychology    [facility]  Neuropsychology Section   [phone]

## 2021-06-16 NOTE — PROGRESS NOTES
Outpatient Followup Psychiatric Evaluation      Pertinent History: Patient first saw me January 2 of 2018.  The patient had been followed at that time by Dr. Nguyen in the primary care clinic.  The patient has a history of Werner's syndrome diagnosed at age 3.  He had been followed by pediatric neurology.  He been on multiple medications over the years.  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  This was to control seizures and it had a positive effect on that.  The patient has had motor dysfunction and required a tendon release procedure.  When I first saw the patient he was on a combination of Prozac, Keppra and trazodone but continued to struggle with depression.  At that visit we did taper off the Prozac and then started the patient on low-dose venlafaxine.      I also had the patient go for neuropsychological testing.  Please see that data in the chart.  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 While Mr. Daugherty did exhibit difficulties with word finding to confrontation on formal exam, his expressive language abilities, thought formulation, and social (pragmatic) speech were grossly intact. He performed in the average range on a measure of acquired vocabulary; however, he struggled somewhat and performed in the borderline impaired range on a measure of general acquired knowledge that his highly correlated with early educational experiences. Today,  Mr. Daugherty presented as a forthcoming and good historian, was organized in recounting the details of his history, and no clear cognitive impairments were noted in casual conversation. 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 Rather, he focused primarily on how his mood symptoms are interfering with his initiative, motivation and drive and undermining his ability to maintain gainful community-based employment.     It is worth noting that Mr. Daugherty s visual impairment and hemiparesis likely undermined his performance on tasks requiring visual scanning and visuomotor speed, and as expected, his processing speed improved to the low average/average range on speeded tasks that were not dependent on those skills/capabilities.  Mr. Daugherty did exhibit a pattern of moderate to severe impairment on measures of visual scanning and motor speed.   His performances on measures of verbal fluency were average to high average when he was provided with phonemic and semantic cues, but declined to the low average range when the task also required mental flexibility and set-shifting.   Visual generative fluency (i.e., design fluency) fell at the low end of the average range.  Learning and memory performances were somewhat variable.  Mr. Daugherty exhibited average learning and delayed recall of a rote 16-item word list. He benefited considerably from repetition (5 exposures to the material) and retained and recalled an average number of words over a long delay.  His performance was notable for difficulties discriminating correct/incorrect responses on recognition format tests. On a more verbal learning task that required him to learn and recall more complex and detailed information (without repeat exposure to the material), Mr. Daugherty s performance fell to the moderately impaired range, though he retained and recalled most of what he learned (75%, average). Furthermore, visual learning and memory abilities were in the mildly impaired to low average range, and he did not benefit from recognition format testing. This pattern of results suggests a relative strength in verbal over visual learning and memory.  Mr. Daugherty  also benefited when [id number] information was broken down into smaller component parts and when he was provided with repetition.       Mr. Daugherty struggled throughout the testing battery with self-monitoring and mental flexibility. For example, although his performance on a measure of semantic fluency was average, he fell to the low average range when he was required to alternate between two categories. Likewise, it took Mr. Daugherty more than three times as long to switch between connecting letters and numbers in a visual array than to connect letters alone. On a measure of nonverbal problem solving and mental flexibility, Mr. Daugherty was inflexible and unable to incorporate feedback from the examiner to adjust his strategy. Finally, Mr. Daugherty endorsed clinically significant symptoms of Depression and Interpersonal Sensitivity.      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   Specifically, his pattern of performance on testing is largely consistent with that observed at the time of his 2006 evaluation suggesting stability of his cognitive deficits despite the recent onset of absence seizures.    Mr. Daugherty exhibits executive dysfunction and lateralized deficits in a number of cognitive domains that are typically ascribed to the right hemisphere (i.e., visuospatial perception, reasoning, problem solving, processing speed). Given Mr. Daugherty s young age at the onset of his condition (Werner s Encephalitis at age 3), some degree of reorganization of cognitive skills predestined to the right hemisphere may have occurred;  however, moderate deficits remain and will continue to impact Mr. Daugherty s functioning in daily life.   Mr. Daugherty acknowledged a recent exacerbation of his depression with social withdrawal. While mood symptoms may be exacerbating his functional difficulties and contributing to declines in his participation in the community, they certainly do not fully account for his difficulties on cognitive testing.   RECOMMENDATIONS:  Given the results of today s evaluation, the following recommendations are made:     1. Given Mr. Daugherty s cognitive impairments on neuropsychometric testing, he would be a good candidate for outpatient occupational and speech therapy to assist in developing compensatory strategies to optimize his functioning in daily life.     2. At this point in time, Mr. Daugherty does exhibit a degree of anosagnosia, or limited awareness of his cognitive deficits and how they impact his day-to-day life.  We will address this during our feedback session and education will be provided; however, I suspect that the important people in Mr. Daugherty s life will need to continuously have conversations with him about his cognitive strengths and weaknesses and how they would be expected to impact his day-to-day life.  Given his degree of cognitive impairment:  a. Mr. Daugherty does not appear capable of utilizing public transportation independently. His impairments with problem solving, spatial reasoning, and executive and social functioning constitute a safety risk.  The unpredictability of his mood and behavior (e.g., verbal aggression and outbursts) pose a particular risk when interacting with individuals outside of his known community. If possible, he should take steps to regain access to [employer]. Otherwise, he will need to be accompanied on outings when using public transportation.  b. Mr. Daugherty will require assistance with meal preparation. Given that Mr. Daugherty s physical limitations impact his ability to cook, he may benefit  from acquiring and learning how to use adaptive equipment in the kitchen. Additionally, due to financial strains, he often utilizes a local food shelf. He may wish to consider signing up for food assistance programs such as Meals on Wheels to help alleviate some of the stress associated with obtaining food.   c. Mr. Daugherty will require a representative payee to likely take over management of his finances and personal affairs.     3.  From a vocational perspective, it is also important for Mr. Daugherty to understand his limitations, particularly with regard to occupational endeavors. Historically he has gotten  in over his head  in new occupational roles, causing his termination and subsequent frustration and depression. Avoiding placing himself in situations where he is unlikely to succeed may prevent future setbacks with regard to mood and self-confidence.   a. Mr. Daugherty s relatively strong verbal reasoning and conversational skills have served him in securing demanding and challenging jobs in the past. His impairments in perceptual reasoning, problem solving, executive functioning, mental flexibility/multitasking, learning, and memory, however, have contributed to his sense of overwhelm and frustration once he begins in new roles.   b. Mr. Daugherty is encouraged to work closely with a vocational counselor who understands his challenges and can help Mr. Daugherty find a job where he is more likely to thrive.  In particular, he will do best in jobs that allow for repetition and hands on learning, require the execution rote tasks, and place limited time demands and pressures upon him. Mr. Daugherty reported that he finds jobs that allow him to be social and talk to people to be the most appealing and fitting for him, and thus he may not be suited for a sheltered workshop setting.      4. Mr. Daugherty endorsed a clinically significant level of depression, both in the interview and on a self-report measure which indicated that he is  experiencing symptoms of low energy, loneliness, hopelessness, and disturbed sleep.  Specifically, Mr. Daugherty described a tendency to become more depressed once he falls out of a healthy routine (which typically involves work and participating in community events/activities) that allows him opportunities to interact socially with others.  Intervening early once Mr. Daugherty s routine is disrupted is important. He recently lost his job and distanced himself from peers and his social group and we will plan to talk with staff from his group home (at the time of the feedback session) about this pattern and how to work closely with him to identify opportunities and reinforce his participation in social and other pleasurable activities.  In addition, it will continue to be important for him to utilize mental health resources, including individual psychotherapy with his therapist at [facility] and psychopharmacological intervention via regular follow-up with Dr. Manuel. Consideration should be given to increasing the frequency of therapy to at least 1x/week.       5. In addition, consideration may be given to having an applied behavior analyst assist (EMIR) in the coordination of Mr. Daugherty s care.  Mr. Daugherty and Joanna described instances when Mr. Daugherty has become frustrated and has become verbally aggressive towards staff members at [facility] [employer]. Work with his behavior analyst could focus on identifying the antecedents of his problematic behaviors (particularly around peers), teaching him concrete relaxation skills or strategies to prevent the unwanted behavior or outburst, and modifying his routine or environment to decrease the likelihood of these events.     6. Medically, Mr. Daugherty described a pattern of disturbed sleep. He should follow up regarding the results of his sleep evaluation and consider intervention, such as use of a CPAP, if necessary. He may also consult with his doctor about techniques  for improving sleep quality, such as consistent bedtimes, regular exercise, and limited caffeine intake.      Because Mr. Daugherty s surgery was approximately 20 years ago, and his condition is stable, neuropsychological re-evaluation need not be repeated unless there is a change in his medical history or level of functioning.         Current Symptoms: Both the patient and his staff member report the patient is doing much better.  The patient states that he is now sleeping better.  He has started the CPAP but he believes his mood is better.  He is brighter.  He is less irritable but still has some periods of irritability.  He is less fatigued during the day.  He did complete Occupational Therapy.  They assisted him with budgeting.  They worked with him on adaptive equipment to use in the kitchen.  He continues with speech therapy worries working on life skills and organizational skills.  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  It does involve some multitasking which is challenging but he states it is going well.  He enjoys it and has been told he is doing a good job.  He did have an upper respiratory infection last week that has resolved otherwise no new medical issues or concerns.  He denies side effects to the medication.  He denies having any psychosis.  No suicidality.  No change in cognition.  He states he was able to go through the neuropsychological testing results with the neuropsychologist and has no questions regarding that.  We did discuss that as well.  The patient is willing to allow an increase in the venlafaxine to see if we can get additional improvement.  Risks and benefits were discussed.    Current Medications: Please see chart. Medications personally reviewed.    Medication Compliance: yes    Side Effects to Medications:  no      Vitals:  Wt Readings from Last 3 Encounters:   01/02/18 197 lb (89.4 kg)     Temp Readings from Last 3 Encounters:   No data found for Temp     BP  Readings from Last 3 Encounters:   01/02/18 113/80     Pulse Readings from Last 3 Encounters:   01/02/18 74         Mental Status Exam:   She was alert smiling calm and comfortable.  He was pleasant and able to participate.  He was able to initiate fairly good attention and concentration.  Mood was not depressed or anxious.  Affect was bright and not labile.  Speech showed intact sentence structure.  He was able to initiate and participate.  Able to dialogue.  Answers were consistent and appropriate.  Thought content does not show any hallucinations or delusions.  No suicidal or homicidal ideation.  Thought formation does not show the patient to be loose.  Insight, judgment and memory are all at baseline.  Fund of knowledge is at baseline.    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    Rule out major depressive disorder, chronic, severe, without psychosis      Plan:  Medication Adjustment:  At this time I am going to increase the recently started Effexor to 150 mg of the XL form daily.    Other:   Patient will continue with therapies as scheduled.  He will continue with his medical follow-up as scheduled.  He will return here in 3 months for a med check.  Both he and his staff member agree to call or return sooner with any questions, concerns or problems.    Continue with the support of the clinic, reassurance, and redirection. Staff monitoring and ongoing assessments per team plan. Current psychotropic medication appears to represent the minimum effective dosage and appears medically necessary. We will continue to monitor and reassess. This team will utilize appropriate emergency services if necessary. I will make myself available if concerns or problems arise.    Ren Manuel MD

## 2021-06-16 NOTE — PROGRESS NOTES
"Speech Language/Pathology  Speech Therapy Outpatient Daily Visit Note    Nicholas Daugherty  YOB: 1990     [id number]    Session 6 of 7    Subjective  Patient presents as motivated and engaged during this session.  An  was not applicable for this session. Staff member from [facility] accompanied patient.   Patient reports no pain    Objective  Memory:   Patient reports he has not missed an appointment. However, staff does report he had some miscommunication re: his schedule for today, mistaking it for Thursday's appointment. Suggested that patient enter a daily reminder to review schedule for the day. Patient preferred to enter it for the night before. Needed min cues to accurately input.     Does report that he continues to omit certain appointments (e.g., therapy he receives in home) stating \"it's consistent\". (Staff reports however that he stated that it did just recently change). Reviewed importance of entering all appointments/events.    Provided guidelines for successful memory system and reviewed. Patient ID'd \"entering appointment immediately\" as most frequent barrier. Discussed how to complete this in context of conversations and other activities.    Assessment  Patient continues to demonstrate improved management of schedule but also demonstrates potential areas that are likely to prove problematic if not addressed. Emphasizing, reviewing and refining guidelines for memory system will likely be beneficial. Continues to need reinforcement at this time, however.     Plan   Current goals remain appropriate    Time: 53 speech/language minutes    Andrey San MA, CCC-SLP    "

## 2021-06-16 NOTE — PROGRESS NOTES
Speech Language/Pathology  Speech Therapy Outpatient Daily Visit Note    Nicholas Daugherty  YOB: 1990     [id number]    Session 5 of 7    Subjective  Patient presents as motivated and engaged during this session. Patient does report feeling fatigued today and does seem a bit more lethargic than previous sessions. He also reports that his new job has presented challenges, especially in the area of organization and executive function.  An  was not applicable for this session. Accompanied by group home staff.   Patient reports no pain    Objective  Memory:  Reports that he was 0% accurate recalling staff to confirm preparation for transportation, which was agreed upon that he would do to demonstrate independence. He has been reportedly successful in being aware and prepared to depart for each appointment and work with 100% accuracy however. He questioned whether having reminder to call staff in the title vs the description of event in his calendar application would be more salient and thus more effective. This was trialed in session and determined to have better potential.     He did not modify the date of this rescheduled session. He reported his method to know upcoming appointments is to go to staff office and ask if there are any appointments he needs to put in his calendar. Toward end of increased independence, introduced strategy of scheduling regular reminders to request updated upcoming appointments from staff. Patient had several upcoming appointments on hand-written note from staff that he had not entered into his calendar. Discussed streamlining organization as necessary aspect of effective memory system.     Assessment  Patient demonstrates reduced missed appointments, but continues to demonstrate decreased ability to show he can independently navigate personal and professional responsibilities. Consulted with OT. Given patient's extensive needs with memory, organizational skills still  require support that OT may more appropriately address given his specific needs r/t occupational demands. Will defer organizational skills to OT and continue to focus on memory skills in speech therapy.    Plan   Current goals remain appropriate    Time: 55 speech/language minutes    Andrey San MA, CCC-SLP

## 2021-06-16 NOTE — PROGRESS NOTES
"Video Visit  Nicholas Daugherty is a 30 y.o. male who is being evaluated via a billable video visit in light of the ongoing global health crisis (COVID-19) that requires us to abide by social distancing mandates in order to reduce the risk of COVID-19 exposure.       The patient has been notified of following:     \"This video visit will be conducted via a video call between you and your physician/provider. We have found that certain health care needs can be provided without the need for a physical exam.  This service lets us provide the care you need with a short phone/video conversation.  If a prescription is necessary we can send it directly to your pharmacy.  If lab work is needed we can place an order for that and you can then stop by our lab to have the test done at a later time.    If during the course of the call the physician/provider feels a video visit is not appropriate, you will not be charged for this service.\"     Patient has given verbal consent to a video visit? Yes    Consent was obtained for this service by one of our care team members    Psychology  No       Any new medication (other provider):   No   Meds started at last appointment  No  Meds increased at last appointment    No      Patient's impression of how medication is working? Pt is stable and doing well.  He is having a little bit of a tough time with not working at the moment which causes him to have mood changes and be a bit down.       Compliant with Medication? Yes     Side Effects: No  Pain (0-10) No   Appetite change No   Sleep disturbance No   Change in energy No   Change in interest No   Change in concentration No   Psychosis/Hallucinations No   Negative thoughts No   Mood swings Yes   Alcohol use No   Drug use No   Anxiety Yes   Sad/depressed mood Yes                                                        Phone Start Time: 3:00pm    Phone End Time:  3:05pm    Total time of phone conversation: 5 minutes    There were no vitals filed for " this visit.    Patient would like the video invitation sent by: [employer]   Number/e-mail address:[phone]    Kriss Delgadillo CMA    Outpatient Followup TBI Evaluation     Pertinent History:  The patient was referred to this clinic for further assessment and treatment . Patient first saw me January 2 of 2018.  The patient had been followed at that time by Dr. Nguyen in the primary care clinic.  The patient has a history of Werner's syndrome diagnosed at age 3.  He had been followed by pediatric neurology.  He been on multiple medications over the years.  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  This was to control seizures and it had a positive effect on that.  The patient has had motor dysfunction and required a tendon release procedure.  When I first saw the patient he was on a combination of Prozac, Keppra and trazodone but continued to struggle with depression.  At that visit we did taper off the Prozac and then started the patient on low-dose venlafaxine.       I also had the patient go for neuropsychological testing.  Please see that data in the chart.      In spring 2018 we did increase the patient's Effexor XL.  In May 2018 we increased the Effexor further to 225 mg a day.     I saw the patient in September 2018 and at that time we did not make any medication changes.     I saw the patient in November 2019.  At that time he was complaining of lower motivation and some difficulty with sleep and poor sleep hygiene.  There were some more situational stressors including the death of his girlfriends mother.  We did did increase the patient's Effexor to 300 mg a day.     I saw the patient in April 2019.  He was doing well at that time.  The increase in Effexor had been helpful.  We did not make any medication changes at that time.     I saw the patient in October 2019.  He had been furloughed from work and was looking for another job.  He was a bit more depressed and frustrated.  His  grandmother was dying with dementia which was difficult for him.  His brother was being deployed to [place].  We did increase his Effexor at that time to 375 mg a da I saw the patient in January 2020. The patient was doing well at that time we did not make any medication changes.    I interviewed the patient as well as a staff member by phone in September 2020.  The patient believes the meds were somewhat low and both he and the staff are requesting to continue with current treatment plan.  We did not make any changes at that visit.       HPI:  Patient presents today for the purposes of medication management.   I interviewed the patient and staff today by phone video.  The patient and staff both reported the patient was doing extremely well.  He was stable and although frustrated with the restrictions related to the lack down he stated his mood was good.  He had no desire to be dead or thoughts of harming himself.  No hallucinations or delusions.  He reports no change in his cognition.  He tells me he is sleeping well and eating well.  There is been no new medical issues or concerns.  No side effects to the medication.  The patient states that he would like to continue with the current medication regime.  The staff agreed.  There is been no new allergies and no new issues or concerns.    The patient and his family just got back from a multi week trip to [place].  That went very well.       We discussed some treatment options and have elected to continue with the current medications.      Current Medications: Please see chart. Medications personally reviewed.     Medication Compliance:  Yes     There are no active problems to display for this patient.    No past medical history on file.  No past surgical history on file.  No family history on file.  Current Outpatient Medications   Medication Sig Dispense Refill     bacitracin zinc 500 unit/gram Pack Apply 1 packet topically.       carbamide peroxide (DEBROX) 6.5 % otic  solution Administer 10 drops into both ears as needed.       dextromethorphan-guaiFENesin (ROBITUSSIN-DM)  mg/5 mL liquid Take 10 mL by mouth every 12 (twelve) hours.       docusate sodium (COLACE) 100 MG capsule Take 100 mg by mouth 2 (two) times a day as needed for constipation.       ferrous sulfate 324 mg (65 mg iron) TbEC        hydrocortisone 1 % cream Apply topically 3 (three) times a day.       ibuprofen (ADVIL,MOTRIN) 200 MG tablet Take 400 mg by mouth every 6 (six) hours as needed for pain.       levETIRAcetam (KEPPRA) 750 MG tablet Take 3,000 mg by mouth daily.       loperamide (IMODIUM) 2 mg capsule Take 2 mg by mouth 4 (four) times a day as needed for diarrhea.       psyllium with dextrose (METAMUCIL JAR) powder Take by mouth 3 (three) times a day as needed.       traZODone (DESYREL) 50 MG tablet Take 50 mg by mouth at bedtime.       venlafaxine (EFFEXOR-XR) 150 MG 24 hr capsule TAKE 2 CAPSULES (300MG) BY MOUTH ONCE DAILY. (VIALS) 60 capsule 11     venlafaxine (EFFEXOR-XR) 75 MG 24 hr capsule TAKE 1 CAPSULE BY MOUTH ONCE DAILY (ALONG WITH 150MG FOR A TOTAL DOSE OF 375MG) 31 capsule 11     No current facility-administered medications for this visit.        Allergies   Allergen Reactions     Tegretol [Carbamazepine] Unknown     Social History     Socioeconomic History     Marital status: Single     Spouse name: Not on file     Number of children: Not on file     Years of education: Not on file     Highest education level: Not on file   Occupational History     Not on file   Social Needs     Financial resource strain: Not on file     Food insecurity     Worry: Not on file     Inability: Not on file     Transportation needs     Medical: Not on file     Non-medical: Not on file   Tobacco Use     Smoking status: Not on file   Substance and Sexual Activity     Alcohol use: Not on file     Drug use: Not on file     Sexual activity: Not on file   Lifestyle     Physical activity     Days per week: Not on  file     Minutes per session: Not on file     Stress: Not on file   Relationships     Social connections     Talks on phone: Not on file     Gets together: Not on file     Attends [employer]ism service: Not on file     Active member of club or organization: Not on file     Attends meetings of clubs or organizations: Not on file     Relationship status: Not on file     Intimate partner violence     Fear of current or ex partner: Not on file     Emotionally abused: Not on file     Physically abused: Not on file     Forced sexual activity: Not on file   Other Topics Concern     Not on file   Social History Narrative     Not on file       The following portions of the patient's history were reviewed and updated as appropriate: allergies, current medications, past family history, past medical history, past social history, past surgical history and problem list.    Review of Systems  A comprehensive review of systems was negative except for what is noted above    Mental Status Exam:     Appearance: The patient was alert, smiling and bright.  There was no evidence of any pain shortness of breath or discomfort.  Behavior:   .  Cooperative and pleasant.  Able to joke.  No evidence of any irritability or agitation.  Speech: .  Sentence structure is intact.  Able to initiate and dialogue.  Not pressured or rambling.  Answers were appropriate.  Mood/Affect:   .. Bright.  No significant depression or anxiety.  Stable mood.  Thought Content: .  No evidence of any psychosis   Suicidal or Homicidal Thoughts:   .  None apparent or reported  Thought Process/Formulation:  .  Not loose.  Tracking well.  Associations: .  Not disorganized.  Tracking well.  No racing thoughts.  Fund of Knowledge:  .  Grossly unchanged.  Grossly intact.  Attention/Concentration: .  Attentive.  Not disorganized.  No racing thoughts.  Insight: .  Grossly intact  Judgement:  .  Grossly intact  Memory:   . . Grossly intact   Motor Status:  .  No tremor.  No  asymmetries.  No changes.  Orientation: .  Grossly oriented.     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     Rule out major depressive disorder, chronic, severe, without psychosis     Plan:  Medication Adjustment:  I will make no medication changes at this time.    Other:   Patient will return to clinic in  4 to 6 months. They agree to call or return sooner with any questions or concerns.  Risks and benefits were discussed.  Continue with his individual therapist.     Continue with the support of the clinic, reassurance, and redirection. Staff monitoring and ongoing assessments per team plan. Current psychotropic medication appears to represent the minimum effective dosage and appears medically necessary. We will continue to monitor and reassess. This team will utilize appropriate emergency services if necessary. I will make myself available if concerns or problems arise.    Total time spent with the patient today was 22 minutes with greater than 50% of the time spent in counseling and care coordination. The patient agrees to call before then with any questions, concerns or problems. We will assess for the appropriateness of possible psychotropic medication trials/changes. The patient will seek out appropriate emergency services should that become necessary.    Video Visit Details    Type of service: Video Visit    Video Start Time: 9:35 AM    Video End Time: 9:50 AM    Total time for video call: 15 minutes    Originating Location: Patient's home    Distant Location:  [facility]    Mode of Communication: Video call via [employer]      Total time for patient and staff interview, chart review and documentation is 22 minutes.    Patient Instructions   It was nice speaking with you today for our office video visit. The following is a summary of our  visit.    General Information:    If lab work was done today as part of your evaluation you will generally be contacted via My Chart, mail, or phone with the results within 1-5 days. If there is an alarming result we will contact you by phone. Lab results come back at varying times, I generally wait until all labs are resulted before making comments on results. Please note labs are automatically released to My Chart once available.     If you need refills please contact your pharmacist. They will send a refill request to me to review. Please allow 3 business days for us to process all refill requests.     Please call or send a medical message through My Chart, with any questions or concerns    If you need any paperwork completed please fax forms to [phone]. Please state if you would like a copy of the completed paperwork, mailed or faxed back to the patient and a fax number to fax the paperwork to. Please allow up to 10 days for paperwork to be completed.    Ren Manuel MD

## 2021-06-16 NOTE — PROGRESS NOTES
Occupational Therapy  OT General Progress Note    Medicare Insurance    Units: 4 ADL  Total Minutes: 55 minutes  Treatment #: 4/6    ASSESSMENT  Summary & Analysis of treatment: Patient arrived to the session with his staff member. Pt recently switched rep payees and is no longer responsible for paying bills. Due to decreased responsibility and progress toward all other goals, it is appropriate to hold further occupational therapy services at this time. It is appropriate to hold services rather than discharge due to recent changes with a new job and new rep payee and patient may require additional sessions to navigate new challenges. See specific goal progress below.    See initial evaluation for goals and POC. Progress toward goals: Improved     Long-term goals to be met by discharge date (6 sessions):  1. Patient will report using a budget to demonstrate greater independence with financial management. -Goal met.  2. Patient will create an organizational system for paying pills to compensate for memory difficulty. - Goal discharged due to change in responsibilities.  3. Patient will demonstrate paying bills correctly 100% of time with the use of compensatory strategies to increase independence. - Goal discharged due to change in responsibilities.  4. Patient will demonstrate appropriate use of cooking utensil AE for increased independence and safety with meal preparation. -Goal met.    PLAN  Treatment time: Self-care / ADL training 55 minutes    Plan: Hold OT    Recommendations: Continue to use the online budget tracker. Hold further OT at this time and return to therapy if needed in the next month after completing a full week at patient's new job and transitioning to a new rep payee.    SUBJECTIVE  Pain: No  Pt reported he is changing rep payees and no longer is responsible for paying bills. Pt reported he will receive an allowance each month. Pt reported he is still responsible to write down his purchases on a  ledger in the main office.    OBJECTIVE    Adaptive equipment:  Therapist introduced the following equipment to assist with independence during kitchen tasks:   *Rocker knife  *One handed Cutting board  *Dycem for opening jars  Pt demonstrated use of all items.    Strategies to increase financial independence:  -Therapist introduced a filing system for paying bills which increased organization when patient begins paying bills in the future. Pt reported understanding.  -Therapist recommended visual cues for writing checks and writing on the ledger. Pt reported understanding.  -Per therapist's recommendation, patient set an alarm on his phone to remind himself to write in the ledger at the same time each day to improve compliance.     Marlena Montez, OTR/L, CLT, 2/12/2018

## 2021-06-16 NOTE — PROGRESS NOTES
"Speech Language/Pathology  Speech Therapy Outpatient Daily Visit Note    Nicholas Daugherty  YOB: 1990     [id number]    Session 4 of 7    Subjective  Patient presents as alert and cooperative during this session.  An  was not applicable for this session. Group home staff present. Reports that he began his new job this past week and worked 2/4 scheduled days.   Patient reports no pain    Objective  Memory:   Patient recalled assignment, implemented. Reported that he has not consistently documented this however. He reports that he would estimate ~30% success rate.   Reviewed appointments scheduled for coming week: 5/7 appointments were input correctly with appropriate and sufficient notifications to remind patient in advance but had only 1/7 reminders to alert staff.   Patient correctly entered appropriate time for notification with initial cues on 7/7 appointments.     Patient reports difficulty tracking calls coordinated with staff on the sheet developed by him/staff. Discussed importance of limited the number of places in which items are located and recommended using [employer] to track, since this is how he is receiving reminders. Patient added tracking to description to each event for the coming week. Needed min cues to recall which events had already been edited.       Assessment  Patient demonstrates continued potential, but decreased organizational skills continue to be barrier to independent use of memory aids. Patient does still occasionally over-estimate his ability to recall (\"oh, I'll remember that\"), but this is decreasing. Good reasoning skills demonstrated as he was problem solving through processes. Patient will need to develop habit of viewing entire event when reminder occurs.     Plan   Current goals remain appropriate. Patient to track successful calls to staff to coordinate/ensure transportation. Patient to trial tracking using \"description\" section of appointment event. "     Time: 65 speech/language minutes    Andrey San MA, CCC-SLP

## 2021-06-17 NOTE — PROGRESS NOTES
Speech Language/Pathology   Discharge Summary    Nicholas Daugherty  YOB: 1990      [id number]   Referring Provider: No ref. provider found      Date of Onset: 4/7/93   Start of Care: 1/10/18   Date of Last Visit: 4/12/18    Medical Diagnosis: Right hemispherectomy  Treatment Diagnosis: cognition  The patient was seen for a total of 9 visits with 4 canceled/no show visits since the start of care.    Summary of Goals and Functional Progress    Long term goals: Patient will improve memory and organization skills to be independent for daily functioning.  Short term goals:   1) Patient will use compensatory strategies to recall daily details 90% of the time per patient report and therapist audit.  Status: Goal met. Patient demonstrates understanding of potential external aids and strategies to improve recall of daily details. He has implemented use of calendar nadja and has synced his phone and tablet for use. He has independently entered appointments in his schedule with alarms to alert him with sufficient time to recall and meet appointments. He uses both individual and repeating options for scheduling his appointments. He also has demonstrated ability to meet requirement to recall contacting staff to ensure he is prepared for appointments the same day. He does require more consistency in this at times (currently ~90% accuracy) but demonstrates understanding.  2) Patient with improve organizational skills for moderate level tasks to structure his day 90% of the time with min assist  Status: Goal met. Patient has begun scheduling a consistent wake time in the morning to more effectively meet daily demands. He also developed a list of activities (including leisure activities) with min assist to be included in his scheduled calendar.    Plan for patient to continue with current memory system strategies over the summer and, if further difficulties present or arise, request orders for speech re-eval and  treat.    Patient participated in education regarding home program and expected functional outcome and demonstrated understanding.    Plan  Discontinue speech therapy  goals met      Andrey San MA, CCC-SLP      Physician Recommendation:  1. I certify the need for these services furnished within this jplan and while under my care.  I agree with the therapist's recommendtion for plan of care.  2. If there is any recommendation for modification of therapy plan, please indicate below.

## 2021-06-17 NOTE — PROGRESS NOTES
Speech Language/Pathology  Speech Therapy Outpatient Daily Visit Note    Nicholas Daugherty  YOB: 1990     [id number]    Session 7 of 7    Subjective  Patient presents as motivated and engaged during this session.  An  was not applicable for this session.  Patient reports no pain  Handoff received from OT. Patient's place of employment is closing and they will no longer be addressing executive function given change of demands.     Objective  Memory: Patient removed future appointments from his calendar with min cues. Needed min cues to edit current series of appointments. Reports that input of appointment immediately continues to be primary barrier to functional use of memory aid. This has led to at least x1 instance of missed appointments.  Patient identified other barriers to effective recall including: difficulty recalling arrangement of transportation even if he has already scheduled appointment. Discussed alternatives (including benefits and drawbacks) with mod assist and determined that entering additional notification and reminder in title section of appointment would be most effective.     Assessment  Patient demonstrates continued improvement in navigation of calendar nadja in phone as memory aid. He is nearing independence with basic memory system using his calendar nadja, but would benefit from further refining and troubleshooting before discharge from speech.    Plan   Current goals remain appropriate, additional 4 sessions to address.    Time: 65 speech/language minutes    Andrey San MA, CCC-SLP

## 2021-06-17 NOTE — PROGRESS NOTES
Speech Language/Pathology  Speech Therapy Outpatient Daily Visit Note    Nicholas Daugherty  YOB: 1990     [id number]    Session 9 of 11    Subjective  Patient presents as motivated and engaged during this session.  An  was not applicable for this session.  Patient reports no pain  Reports frustration with connecting with new employer, process of pre-testing and enrolling in school in the fall.     Objective  Memory: Homework  Assignment: To reduce instances in which he does not awaken sufficiently early to contact staff early enough, patient determined to set alarm to wake up earlier. He states he was successful with this 100% of the time since last session.  Assignment: To demonstrate independence for attending scheduled appointments, patient to call staff one-hour prior to appointment. Reports he has called staff one-hour before scheduled appointment on 3/3 opportunities (acknowledges limited opportunities).     To improve daily structure, patient encouraged to create a regular schedule, as consistent as possible from day-to-day. Patient generated list of daily activities with min cues/asistance.     Assessment  Demonstrates good understanding and implementation of memory system to improve independence to recall daily activities and organization for daily tasks. Sufficient to discharge therapy at this time.    Plan    Discontinue speech therapy due to indendence with understanding and ability to implement strategies. Plan for patient to continue with current memory system strategies over the summer and, if further difficulties present or arise, request orders for speech re-eval and treat.    Time: 50 speech/language minutes    Andrey San MA, CCC-SLP

## 2021-06-17 NOTE — PROGRESS NOTES
"Occupational Therapy  OT General Progress Note    Medicare Insurance    Units: 3 ortho 2 ot ex  Total Minutes: 75 min  Treatment #: 6/10/    ASSESSMENT  Summary & Analysis of treatment: Pt seen this date with staff from [facility]. Patient's greatest concern at this time is the \"tightness\" he feels in his left elbow, shoulder, and digits. Patient had surgery about 15 years ago (per patient's report) to fuse his LUE wrist and adjust the tendon in his LUE elbow. Since that time, patient feels that his UE has continued to \"tighten\" and become uncomfortable. Pt had decreased tone in l shoulder elbow and hand following mat u/e stretches and tolerating l resting hand splint    See initial evaluation for goals and POC. Progress toward goals: Improved    PLAN  Treatment time: Therapeutic Exercise 30 minutes and Ortho 45 minutes    Plan: 1.Continue per plan of care.  1. Patient will demonstrate improved function with LUE by using it to actively assist with ADLs.- New goal 3/29/18  2. Patient will demonstrate improved performance with computer use by using his LUE to actively assist his RUE.- New goal 3/29/18  3. Patient will demonstrate independence with an exercise program to improve LUE comfort and mobility during I/ADLs. - New goal 3/29/18  4.Patient will tolerate a LUE resting hand splint and adaptive equipment PRN to improve performance with I/ADLs. - New goal 3/29/18  Recommendations: adaptive equipment, LUE home ex program.    SUBJECTIVE  Pain: No      OBJECTIVE  Splinting: L resting hand splint fabricated in functional position. Pt able to doff and don splint Pt and staff given handout on precautions needing splint adjustments and wearing schedule day and evening shifts up to 4 hrs at a time then off 1-2 hrs then splint can be reapplied. Pts hand responded well to split with decrease tone.    L U/E ex: Pt side lying on r able to tolerate l arm neutral rotatoin and able to bring arm 10 degrees in " protraction/retraction  Pt sitting in chair pt also able to keep arm neutral rotation Pt and group home will carry out stretches at home and pt took pictures on his phone for reference          MEDICARE PATIENT: Yes: HCIN #[id number]; Provider # ; Certification Dates: from 3/29/18 to 7/3/18

## 2021-06-17 NOTE — PROGRESS NOTES
"Occupational Therapy    OT General Progress Note-Updated plan of care and G-codes     Medicare Insurance     Units: 2 ADL, 2 th ex  Total Minutes: 55 minutes  Treatment #: 5/10- requested 4 additional sessions    4 more requested     ASSESSMENT  Summary & Analysis of treatment: Patient presented to therapy with his group home staff member. Patient continues to report understanding of strategies presented in previous sessions to assist with financial management. Patient had returned for two additional occupational therapy sessions due to continued difficulty with organizational skills at his new job. Patient recently lost his job and no longer needs assistance in this area. Patient met goals regarding financial management and cooking. All other goals are no longer appropriate due to a change with his rep payee. Patient's greatest concern at this time is the \"tightness\" he feels in his left elbow, shoulder, and digits. Patient had surgery about 15 years ago (per patient's report) to fuse his LUE wrist and adjust the tendon in his LUE elbow. Since that time, patient feels that his UE has continued to \"tighten\" and become uncomfortable. Patient appeared to respond well to light stretching at his shoulder and elbow during the session as he reported increased comfort and demonstrated increased range of motion. Patient is appropriate to continue occupational therapy to increase LUE function, position, and comfort during I/ADL function.     See initial evaluation for goals and POC. Progress toward goals: Improved      Long-term goals to be met by discharge date (6 sessions):  1. Patient will report using a budget to demonstrate greater independence with financial management. -Goal met. Discharge goal.  2. Patient will create an organizational system for paying pills to compensate for memory difficulty. - Goal discharged due to change in responsibilities.  3. Patient will demonstrate paying bills correctly 100% of time with " "the use of compensatory strategies to increase independence. - Goal discharged due to change in responsibilities.  4. Patient will demonstrate appropriate use of cooking utensil AE for increased independence and safety with meal preparation. -Goal met. Discharge goal.    5. Patient will demonstrate improved function with LUE by using it to actively assist with ADLs.- New goal 3/29/18  6. Patient will demonstrate improved performance with computer use by using his LUE to actively assist his RUE.- New goal 3/29/18  7. Patient will demonstrate independence with an exercise program to improve LUE comfort and mobility during I/ADLs. - New goal 3/29/18  8.Patient will tolerate a LUE resting hand splint and adaptive equipment PRN to improve performance with I/ADLs. - New goal 3/29/18     PLAN  Treatment time: Self-care / ADL training 25 min, th ex 30 minutes    Frequency/Duration: 1 times per week, for 5 weeks; Up to 5 more visits     Plan of Care: Self Cares / ADL Training, Communications with referral Source, patient, caregiver and treatment Team, Patient/ Family instruction: Etiology of diagnosis or presented symtoms, Treatment plan/rationale, Home Exercise Program and Expected Functional Outcomes , Cognitive Training and Compensatory Strategies  Prognosis to achieve goals: Good    Plan: 1.Continue per plan of care. and 2.Patient progressing towards goals.      Recommendations: LUE resting hand splint, adaptive equipment, LUE home ex program.     SUBJECTIVE  Pain: No  Pt reported he recently lost his job due to the business closing. Pt is planning to go to school in the spring or fall in [facility]. Pt has a job interview today for a [employer] job. Pt reported frustration regarding his rep payee situation. Pt reported he would like to use the online computer system to track money when he starts his job. Pt reported he felt a \"good stretch\" during LUE ROM. Pt reported he had surgery for a tendon in left elbow and wrist " fusion on left when he was in middle school. Therapist requested pt bring specific notes from the procedure to the next appointment.     OBJECTIVE  ADL:  -Therapist and patient reviewed strategies for financial management from previous sessions.   -Pt and therapist discussed potential needs for organizational skills when pt starts a new job and begins school. Pt reported understanding.    Therapeutic ex:   -UE range of motion: LUE elbow range at rest 0-105 degree. With slight stretching pt was able to attain 0-130 degrees. Pt reported feeling much better after stretching. Pt was 0-115 degrees at rest after stretch. Pt participated in PROM and slow stretch for his left elbow x 3 reps.   -Therapist consulted with another occupational therapist regarding splinting needs. Pt was identified as a candidate for splints and adaptive equipment.  -Patient demonstrated understanding of a ROM ex for LUE to externally rotate, adduct, and slightly pronate to rest UE on an arm rest. Staff was present and also reported understanding.    MEDICARE PATIENT: Yes: HCIN #[id number]; Provider # ; Certification Dates: from 3/29/18 to 7/3/18    HILDA Crystal/L, CLT, 3/30/2018

## 2021-06-17 NOTE — PROGRESS NOTES
Occupational Therapy  OT General Progress Note    Medicare Insurance    Units: 4 ot ex  Total Minutes: 57  Treatment #: 7/10    ASSESSMENT  Summary & Analysis of treatment: pt seen this date with staff person from [facility].Pt performing arm stretches when therapist arrived. Pt and staff  Report splint working well and decrease tone with splint and ex.     See initial evaluation for goals and POC. Progress toward goals: Improved    PLAN  Treatment time: Therapeutic Exercise 57 minutes    Plan: 1.Continue per plan of care. and 2.Patient progressing towards goals.   Plan: 1.Continue per plan of care.  1. Patient will demonstrate improved function with LUE by using it to actively assist with ADLs.- New goal 3/29/18  2. Patient will demonstrate improved performance with computer use by using his LUE to actively assist his RUE.- New goal 3/29/18  3. Patient will demonstrate independence with an exercise program to improve LUE comfort and mobility during I/ADLs. - New goal 3/29/18  4.Patient will tolerate a LUE resting hand splint and adaptive equipment PRN to improve performance with I/ADLs. - New goal 3/29/18  Recommendations: adaptive equipment, LUE home ex program.looking into elbow extention splint air splint and arm bike for home use        SUBJECTIVE  Pain: No      OBJECTIVE Pt able to tolerate air splint on l arm with 15 degrees of horizontal abd/add and shoulder flex to 100 degrees, Pt able to carry l aarom shoulder protraction retraction and shoulder flex ext  side lying on r picture taken of position  Pt able to verbalize and demonstrate bed positioning with neutral postioning of l shoulder and 45 flex side lying on l   With side lying on l after stretches pt able to active move l elbow In flex ext 1/4 range pt unable to isolate movement prior  Sitting pt able to use l arm on arm bike. Pt to continue carriyng out wearing resting hand splint day and evening shifts and place built up 1 inch foam dowel in l hand  at night  Pt to carry out ex sitting and on bed and couch contiue ot per goals above  Nicole Delgadillo, OT 4/12/2018

## 2021-06-17 NOTE — PROGRESS NOTES
"Speech Language/Pathology  Speech Therapy Outpatient Daily Visit Note    Nicholas Daugherty  YOB: 1990     [id number]    Session 8 of 11    Subjective  Patient presents as alert and cooperative during this session.  An  was not applicable for this session.  Patient reports no pain    Objective  Memory: Patient reports he has kept track of all appointments upcoming. However, he then reports he had \"a few mix-ups\", though. He reports he did not feel well for a dentist appointment, did not initiate call to staff, did not call dentist to cancel. Strategy encouraged: use alert for the days scheduled the night before as trigger to plan when to wake up and call staff the next day. Also encourage setting consistent, and relatively early time, each day, to ensure readiness and facilitate motivation. Had misunderstanding on dates of when he had appointments (discrepancy with staff, but he had immediately entered information and turned up he was correct). He did not go to another appointment, did call and cancel rides (on time).     Patient independently identified barrier to success with newly acquired job, namely inconsistent hours.  Strategy encouraged: request consistent schedule as an accommodation. Patient did independently initiate contacting , made request, and immediately entered regularly scheduled appointment with her. Did need cues to use reminder to ask her an additional question not addressed in their conversation.     Assessment  Patient demonstrates continued barriers to independence related to memory, including inadequate advance notice to staff, inadequate use of notification of days events the night before to plan and execute his waking time,   He does demonstrate some inconsistencies when reporting his success rate and would likely need confirmation/input from staff regarding his accuracy.     Plan   Current goals remain appropriate. Patient begin waking up earlier to contact " staff, patient to call staff 1 hour prior to all appointments.     Time: 55 speech/language minutes    Andrey San MA, CCC-SLP

## 2021-06-17 NOTE — PROGRESS NOTES
Occupational Therapy  OT General Progress Note    Medicare Insurance    Units: 8 adl 45 ot ex 10 ortho  Total Minutes: 68  Treatment #: 9/10    ASSESSMENT  Summary & Analysis of treatment:  Pt seen this date with group home manger, assisted pt with bringing in his bike in from home and u/e bike for modifications   Pt and  making apt for Dr  To initiate getting elbow extention splint    See initial evaluation for goals and POC. Progress toward goals: Improved    PLAN  Treatment time: Therapeutic Exercise 45 minutes, Self-care / ADL training 8 minutes and Ortho 15 minutes    Plan: 1.Continue per plan of care. and 2.Patient progressing towards goals.   1. Patient will demonstrate improved function with LUE by using it to actively assist with ADLs.- New goal 3/29/18  2. Patient will demonstrate improved performance with computer use by using his LUE to actively assist his RUE.- New goal 3/29/18  3. Patient will demonstrate independence with an exercise program to improve LUE comfort and mobility during I/ADLs. - New goal 3/29/18  4.Patient will tolerate a LUE resting hand splint and adaptive equipment PRN to improve performance with I/ADLs. - New goal 3/29/18    Recommendations: adapt arm bike for l u/e, adjust l resting hand splints and adjust hep with elbow ext splint    SUBJECTIVE  Pain: No      OBJECTIVE adapted L stationary handle for pts bike to increase posture and balance with biking . Pt able to get on bike and use L handle and per therapist had good posture riding bike. Pt c/o how awkward it was. Pt use to sitting straight up and not in normal biking position with both hands on handle. Therapist,  and pt decided with decrease tone with ex and pt able to keep arm off of chest when riding pt was better without adaptive equipment second to cognitive impairment and prior hx of falls.Pt  Stated difficulty of staff supporting arm with stretches pt and staff educated  in arm stretches on table with L elbow on table and hand on l side of face pt can carry out on own  And staff assisting pt with holding dowel horizontal for pt to work on shoulder flexion 90 degrees ,scapula protraction and elbow ext stretch. Pt able to  Use u/e bike with min a for positoning l arm 2 min  Nicole Delgadillo, OT

## 2021-06-17 NOTE — PROGRESS NOTES
Speech Language/Pathology  Updated Plan of Care    Nicholas Daugherty  YOB: 1990      [id number]   Referring Provider: No ref. provider found      Date of Onset: 4/7/93  Start of Care: 1/10/2018   Date of Last Visit: 3/29/18    Medical Diagnosis: Right hemispherectomy  Treatment Diagnosis: cognition  The patient was seen for a total of 7 visits with 4 canceled/no show visits since the start of care.    Summary of Goals and Functional Progress  Long term goals: Patient will improve memory and organization skills to be independent for daily functioning.  Short term goals:   1) Patient will use compensatory strategies to recall daily details 90% of the time per patient report and therapist audit.  Goal status: Not met, progressing  Patient has demonstrated understanding of and implementation of compensatory strategies to recall daily details. He has demonstrated good insight into barriers that both have, and those that continue, to prevent him from independently managing his daily responsibilities. He continues to require cues, reminders and follow-up with staff to ensure he recalls and is prepared for impending appointments, estimates around ~30% of the time. However, this may be more as staff continues to ensure patient is prepared for appointments and are alerting him prior to nearly all appointments. Goal remains for patient to identify and alert staff proactively in the necessary time in advance to show independence.     2) Patient with improve organizational skills for moderate level tasks to structure his day 90% of the time with min assist  Goal status: Not met, progressing  Patient continues to demonstrate some difficulty in consistently entering information into his calendar to address above goal. He requires min cues to complete and is successful ~80% of the time. Once goal #1 is met, will explore with more attention this goal to improve structure in his day to best address memory and completion of  daily tasks.     Patient participated in education regarding treatment plan/rationale, home program and expected functional outcome and verbalized understanding.    Plan  Speech therapy 1 times per week for 4 weeks and for a total of 4 treatment sessions, from 4/5/18 to 4/26/18  goals updated      Andrey San MA, CCC-SLP      Physician Recommendation:  1. I certify the need for these services furnished within this jplan and while under my care.  I agree with the therapist's recommendtion for plan of care.  2. If there is any recommendation for modification of therapy plan, please indicate below.

## 2021-06-18 NOTE — PROGRESS NOTES
Occupational Therapy  OT General Progress Note    Medicare Insurance    Units: 4 ot ex  Total Minutes: 60  Treatment #: 10//10    ASSESSMENT  Summary & Analysis of treatment: pt seen this date with group home staff increase elbow flexion noted, pt and staff report pt decreae doing hep    See initial evaluation for goals and POC. Progress toward goals: Improved    PLAN  Treatment time: Therapeutic Exercise 60 minutes    Plan: 1.Continue per plan of care. and 2.Patient progressing towards goals.   requesting 1 more additional ot tx per goals and new goals #5  1. Patient will demonstrate improved function with LUE by using it to actively assist with ADLs.- New goal 3/29/18  2. Patient will demonstrate improved performance with computer use by using his LUE to actively assist his RUE.- New goal 3/29/18  3. Patient will demonstrate independence with an exercise program to improve LUE comfort and mobility during I/ADLs. - New goal 3/29/18  4.Patient will tolerate a LUE resting hand splint and adaptive equipment PRN to improve performance with I/ADLs. - New goal   5. Pt will carry out hep and maintain elbow ext with wearing elbow ext splint daily and with exercises new goal 5-10-18  Recommendations: pt and group home staff  To be educated in hep with using elbow ext splint  To increase ability for pt to carry out ex with staff supervision    SUBJECTIVE  Pain: No      OBJECTIVE prolong stretch sidelying and supine l shoulder and elbow increase tone with pt not being seen by ot in 2 weeks, u/e bike and shoulder protraction/retraction pullies adapted pt able to use with staff assistance pt and staff able to demonstrate tech   Nicole Delgadlilo OT .

## 2021-06-18 NOTE — PROGRESS NOTES
Outpatient Followup Psychiatric Evaluation      Pertinent History: Patient first saw me January 2 of 2018.  The patient had been followed at that time by Dr. Nguyen in the primary care clinic.  The patient has a history of Werner's syndrome diagnosed at age 3.  He had been followed by pediatric neurology.  He been on multiple medications over the years.  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  This was to control seizures and it had a positive effect on that.  The patient has had motor dysfunction and required a tendon release procedure.  When I first saw the patient he was on a combination of Prozac, Keppra and trazodone but continued to struggle with depression.  At that visit we did taper off the Prozac and then started the patient on low-dose venlafaxine.      I also had the patient go for neuropsychological testing.  Please see that data in the chart.     At the last visit I did increase the Effexor XL to 150 mg.  The patient has been following up with occupational therapy and speech therapy.    Current Symptoms: Patient admits ongoing frustration and anxiety.  He continues to appear somewhat depressed according to the staff.  None of this if changed significantly with the increase in the Effexor.  The patient reports most of his anxiety is centered around his work.  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  He is only sold 1 phone.  He states that the manager has told him that he is doing well and there is been no specific complaints but he has missed for unexcused and on notified times it worked the most recently being a few days ago and he may be disciplined or fired for this.  The patient is not yet gone out to look for other work but is considering other types of employment which might be less stressful.      Patient is sleeping well.  No change in cognition.  No change in appetite.  He is having  no side effects to the medication and there is no new medical issues.  No psychosis.  We did discuss the current treatment plan the possibility of an increase in the Effexor and he is willing to do that.    I did review and fill out paperwork brought by the staff.    Current Medications:  Please see the records.  Medications were personally reviewed.    Medication Compliance: Yes    Side Effects to Medications: No      Vitals:  Wt Readings from Last 3 Encounters:   01/02/18 197 lb (89.4 kg)     Temp Readings from Last 3 Encounters:   No data found for Temp     BP Readings from Last 3 Encounters:   01/02/18 113/80     Pulse Readings from Last 3 Encounters:   01/02/18 74       Problem List (Please see medical records):      Mental Status Exam:   Appearance:  Patient appears slow and flat.  No obvious shortness of breath. No obvious pain at this time.  Behavior: Slow.  Needing prompts to participate.  Not agitated. No restlessness.  No reports of any significant recent behavioral dyscontrol.  Speech: Monotone.  Somewhat soft-spoken.  Answers were consistent but somewhat vague.  Not pressured or rambling.  Mood/Affect:  Flat, slow, depressed. No current anxiety or agitation. Not currently labile.  Thought Content:  No evidence of psychosis. No recent reported psychosis.  Suicidal or Homicidal Thoughts:  None apparent or reported.   Thought Process/Formulation:  Slow. Sarah Ann. No evidence of any racing thoughts.  Able to track and follow.  Associations: No obvious loosening of associations.  Slow. Sarah Ann.  Fund of Knowledge:  Please see the therapy notes. No reports of any significant recent change.  Attention/Concentration:  Flat slow.  Able to follow  conversation. No reports of any significant recent change.  Insight: No reports of any recent change.    Judgement: No reports of any recent change.   Memory:   Slow. Please see therapy notes.  Motor Status:   No current tremor.  Orientation: No reports of any recent  change.    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     Rule out major depressive disorder, chronic, severe, without psychosis      Plan:  Medication Adjustment:  At this time I am going to increase the Effexor XR to 225 mg a day.  Risks and benefits were discussed.    Other:   Patient will follow up here in 3 months for med check.  Both he and the staff member present agreed to call with any questions or concerns.    Continue with the support of the clinic, reassurance, and redirection. Staff monitoring and ongoing assessments per team plan. Current psychotropic medication appears to represent the minimum effective dosage and appears medically necessary. We will continue to monitor and reassess. This team will utilize appropriate emergency services if necessary. I will make myself available if concerns or problems arise.    Ren Manuel MD

## 2021-06-18 NOTE — PROGRESS NOTES
Patient's impression of how medication is working? Fine no changes3    Compliant with Medication? yes    Side Effects: None    Current Symptoms : No    Any Concerns? PT here for a F/U    Pain (0-10) No  Appetite change No  Sleep disturbance No  Change in energy No  Change in interest No  Change in concentration No  Psychosis/Hallucinations No  Negative thoughts No  Mood swings No  Alcohol use No  Drug use No  Anxiety mild  Sad/depressed mood mild

## 2021-06-19 NOTE — PROGRESS NOTES
Occupational Therapy  OT General Progress Note    Medicare Insurance    Units: 4 ot ex 1 adl  Total Minutes: 70  Treatment #: 11/11    ASSESSMENT  Summary & Analysis of treatment: Pt arrived with staff from group home pt brought in resting hand splint and elbow ext splint    See initial evaluation for goals and POC. Progress toward goals: Achieved    PLAN  Treatment time: Therapeutic Exercise 60 minutes and Self-care / ADL training 10 minutes    Plan: 3.Patient has met goals. See discharge summary.    Recommendations: no further OT at this time pt to wear splint elbow ext splint and hand splint 2 hrs 3x day and wear up to 4 hrs at a time if splint increases adls function, Pt to use splint to carry out ex supine 2x day when splint is applied    SUBJECTIVE  Pain: No      OBJECTIVE elbow ext splint modified to using 2 straps instead of 4, to increase positioning and ease of donning splint. Previous resting hand splint was d/c and hand based splint was fabricated to use with elbow ext splint to decrease tone in hand and elbow. Pt and group home staff shown ex pt had done prior on mat that pt can increase control with doing ex with splints on hand. Pt and staff able to verbalize and pt able to demonstrate. Picture was taken of hand/ elbow splint for staff to assist with donning splint. Pt and staff was also educated in adls pt can use l arm with elbow/ hand splint on with task as stabilizing assist to increase correct posture and u/e function with tasks, pt able to use with writing holding bowl in place and shown methods to use with folding clothes and placing clothes on hangers  Nicole Delgadillo, OT 7/3/2018

## 2021-06-20 NOTE — PROGRESS NOTES
Patient's impression of how medication is working? Pt states his meds are working well for him. No complaints.    Compliant with Medication? None    Side Effects: None    Current Symptoms : No    Pain (0-10) No  Appetite change No  Sleep disturbance No  Change in energy No  Change in interest No  Change in concentration No  Psychosis/Hallucinations No  Negative thoughts No  Mood swings No  Alcohol use No  Drug use No  Anxiety none  Sad/depressed mood none

## 2021-06-20 NOTE — PROGRESS NOTES
Outpatient Followup Psychiatric Evaluation      Pertinent History: Patient first saw me January 2 of 2018.  The patient had been followed at that time by Dr. Nguyen in the primary care clinic.  The patient has a history of Werner's syndrome diagnosed at age 3.  He had been followed by pediatric neurology.  He been on multiple medications over the years.  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  This was to control seizures and it had a positive effect on that.  The patient has had motor dysfunction and required a tendon release procedure.  When I first saw the patient he was on a combination of Prozac, Keppra and trazodone but continued to struggle with depression.  At that visit we did taper off the Prozac and then started the patient on low-dose venlafaxine.      I also had the patient go for neuropsychological testing.  Please see that data in the chart.     In spring 2018 we did increase the patient's Effexor XL.  In May 2018 we increased the Effexor further to 225 mg a day.    Current Symptoms:   The patient tells me he is doing well.  He believes his mood is better.  He is less anxious.  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  He also tried working at [employer] for a month but that was too physically taxing.  He is now back in school and is working on that.  He states that is going fairly well.  He is trying to discover his best way of retaining information and is working on how to take notes better.  He is working with a  at the school for these issues and feels like they are being adequately addressed.    Patient reports he sleeping well.  No change in appetite.  No change in cognition.  He reports no suicidality.  No psychosis.  No new medical issues and no side effects to the medication.  Staff presence also states the  patient is doing quite well.  Both the patient and staff would like to continue with the current medication regime.  He denies side effects to the medication.  Risks and benefits were discussed.    I did review and fill out paperwork brought by the staff.    Current Medications:  Please see the records.  Medications were personally reviewed.    Medication Compliance: Yes    Side Effects to Medications: No      Vitals:  Wt Readings from Last 3 Encounters:   01/02/18 197 lb (89.4 kg)     Temp Readings from Last 3 Encounters:   No data found for Temp     BP Readings from Last 3 Encounters:   01/02/18 113/80     Pulse Readings from Last 3 Encounters:   01/02/18 74       Problem List (Please see medical records):      Mental Status Exam:   Appearance:  The patient was alert, comfortable and calm. No agitation. Not currently in any pain. No evidence of any shortness of breath.  Pleasant and friendly.  Wide-based gait but he was able to negotiate the hallway as well.  Behavior:  The patient is calm, cooperative, with no agitation or obvious distress. The patient does participate. No restlessness. No reports of any recent behavioral dyscontrol.  He was able to initiate.  Speech:  Sentence structure is intact.  Somewhat simple and concrete.  Somewhat monotone.  Appears baseline.  Able to dialogue. Answers are consistent and somewhat vague.  Not pressured. Not rambling.  Mood/Affect:  Patient appears alert. No obvious depression or anxiety. No irritability. No lability.  Thought Content:  No evidence of any psychosis. No reports of any recent psychosis.  Suicidal or Homicidal Thoughts:  None apparent or reported. The patient denies any suicidal or homicidal ideation.   Thought Process/Formulation:  Able to track and follow.  The patient does need prompts and structure.  No racing thoughts.  Somewhat concrete.  Somewhat vague.  Associations:  Fair.  No recent change.  Able to process information.  Fund of Knowledge: Impaired.   No reports of any significant recent change.   Attention/Concentration: Needs prompts to attend. Concentration continues impaired.  Slow.  Somewhat concrete.  Not disorganized.  Insight:  Grossly unchanged.  Continues impaired  Judgement:  Grossly unchanged.  Continues impaired  Memory:  Grossly fair.  Needing prompts and structure.    Motor Status:  No recent change reported. No current tremor.   Orientation: No reports of any recent change.    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     Rule out major depressive disorder, chronic, severe, without psychosis      Plan:  Medication Adjustment:  I will make no psychotropic medication changes at this time.    Other:   Patient will follow up here in 6 months for med check.  Both he and the staff member present agreed to call with any questions or concerns.    Continue with the support of the clinic, reassurance, and redirection. Staff monitoring and ongoing assessments per team plan. Current psychotropic medication appears to represent the minimum effective dosage and appears medically necessary. We will continue to monitor and reassess. This team will utilize appropriate emergency services if necessary. I will make myself available if concerns or problems arise.    Ren Manuel MD

## 2021-06-21 NOTE — PROGRESS NOTES
Outpatient Followup Psychiatric Evaluation      Pertinent History: Patient first saw me January 2 of 2018.  The patient had been followed at that time by Dr. Nguyen in the primary care clinic.  The patient has a history of Werner's syndrome diagnosed at age 3.  He had been followed by pediatric neurology.  He been on multiple medications over the years.  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  This was to control seizures and it had a positive effect on that.  The patient has had motor dysfunction and required a tendon release procedure.  When I first saw the patient he was on a combination of Prozac, Keppra and trazodone but continued to struggle with depression.  At that visit we did taper off the Prozac and then started the patient on low-dose venlafaxine.      I also had the patient go for neuropsychological testing.  Please see that data in the chart.     In spring 2018 we did increase the patient's Effexor XL.  In May 2018 we increased the Effexor further to 225 mg a day.    I saw the patient in September 2018 and at that time we did not make any medication changes.    Current Symptoms:   Patient presents today with a staff member.  He believes he is a bit worse.  He states he has lower motivation and is having a difficult time sleeping.  He does admit that he naps during the day and may be over sleep.  We did discuss some sleep hygiene.  There is been multiple stressors including the death of his girlfriends mother.  He dislikes where he lives.  School did not go as well as he wanted it to    The patient denies any is had any suicidality.  He denies having any change in cognition.  No psychosis.  No change in appetite.  He did start a new job but is only had a few shifts.  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  We did discuss trying to manage that job.  He states  he has a lot of support at the job and feels good about it so far.  He denies having any new medical issues.  He denies side effects to the medication.    The patient reports that he did have some improvement in his mood with the last increase in Effexor.  We discussed some treatment options and he is willing to allow an increase in the Effexor at this time.  Risks and benefits were discussed.    I did review and fill out paperwork brought by the staff.    Current Medications:  Please see the records.  Medications were personally reviewed.    Medication Compliance: Yes    Side Effects to Medications: No      Vitals:  Wt Readings from Last 3 Encounters:   01/02/18 197 lb (89.4 kg)     Temp Readings from Last 3 Encounters:   No data found for Temp     BP Readings from Last 3 Encounters:   01/02/18 113/80     Pulse Readings from Last 3 Encounters:   01/02/18 74       Problem List (Please see medical records):      Mental Status Exam:   Appearance:  Patient appears slow and flat.  No obvious shortness of breath. No obvious pain at this time.  Behavior: Slow.  Needing prompts to participate.  Not agitated. No restlessness.  No reports of any significant recent behavioral dyscontrol.  Speech: Monotone.  Sentence structure was intact.  He was able to initiate was fairly talkative.  There is a slight delay.  Answers were consistent and appropriate.  Mood/Affect:  Flat, slow, depressed. No current anxiety or agitation. Not currently labile.  Thought Content:  No evidence of psychosis. No recent reported psychosis.  Suicidal or Homicidal Thoughts:  None apparent or reported.   Thought Process/Formulation:  Slow. [place]. No evidence of any racing thoughts.  Able to track and follow.  Associations: No obvious loosening of associations.  Slow. [place].  Fund of Knowledge:  Please see the therapy notes. No apparent recent change.  Attention/Concentration:  Flat slow.  Able to follow some simple conversation. No apparent recent  change.  Insight: No apparent recent change.    Judgement: No apparent recent change.    Memory:   Slow.   Motor Status:   No current tremor.  Orientation: No reports of any recent change.     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     Rule out major depressive disorder, chronic, severe, without psychosis      Plan:  Medication Adjustment:  We are going to increase the Effexor to 300 mg a day.    Other:   Patient will follow up here in 3-4 months for med check.  Both he and the staff member present agreed to call with any questions or concerns.    Continue with the support of the clinic, reassurance, and redirection. Staff monitoring and ongoing assessments per team plan. Current psychotropic medication appears to represent the minimum effective dosage and appears medically necessary. We will continue to monitor and reassess. This team will utilize appropriate emergency services if necessary. I will make myself available if concerns or problems arise.    Ren Manuel MD

## 2021-06-21 NOTE — PROGRESS NOTES
Patient's impression of how medication is working? Tired, Unmotivated     Compliant with Medication? Yes     Side Effects: None    Current Symptoms : Yes    Pain (0-10) No  Appetite change No  Sleep disturbance No  Change in energy Yes  Change in interest No  Change in concentration No  Psychosis/Hallucinations No  Negative thoughts Varies on the day   Mood swings Varies on the day   Alcohol use No  Drug use No  Anxiety moderate  Sad/depressed mood moderate

## 2021-06-26 ENCOUNTER — HEALTH MAINTENANCE LETTER (OUTPATIENT)
Age: 31
End: 2021-06-26

## 2021-07-03 NOTE — ADDENDUM NOTE
Addendum Note by Andrey San MA, CCC-SLP at 1/10/2018  3:17 PM     Author: Andrey San MA, CCC-SLP Service: -- Author Type: Speech and Language Pathologist    Filed: 1/10/2018  3:17 PM Date of Service: 1/10/2018  3:17 PM Status: Signed    : Andrey San MA, CCC-SLP (Speech and Language Pathologist)    Encounter addended by: Andrey San MA, CCC-SLP on: 1/10/2018  3:17 PM<BR>     Actions taken: Patient Education title added, Patient Education documented on, Patient Education resolved, Sign clinical note

## 2021-07-03 NOTE — ADDENDUM NOTE
Addendum Note by Kriss Delgadillo CMA at 5/29/2018 12:59 PM     Author: Kriss Delgadillo CMA Service: -- Author Type: Certified Medical Assistant    Filed: 5/29/2018 12:59 PM Date of Service: 5/29/2018 12:59 PM Status: Signed    : Kriss Delgadillo CMA (Certified Medical Assistant)    Encounter addended by: Kriss Delgadillo CMA on: 5/29/2018 12:59 PM<BR>     Actions taken: Charge Capture section accepted

## 2021-07-03 NOTE — ADDENDUM NOTE
Addendum Note by Marlena Montez OT at 3/29/2018 11:59 PM     Author: Marlena Montez OT Service: -- Author Type: Occupational Therapist    Filed: 3/30/2018  8:12 AM Date of Service: 3/29/2018 11:59 PM Status: Signed    : Marlena Montez OT (Occupational Therapist)    Encounter addended by: Marlena Montez OT on: 3/30/2018  8:12 AM<BR>     Actions taken: Charge Capture section accepted

## 2021-07-03 NOTE — ADDENDUM NOTE
Addendum Note by Kriss Delgadillo CMA at 11/15/2018 10:05 AM     Author: Kriss Delgadillo CMA Service: -- Author Type: Certified Medical Assistant    Filed: 11/15/2018 10:05 AM Date of Service: 11/15/2018 10:05 AM Status: Signed    : Kriss Delgadillo CMA (Certified Medical Assistant)    Encounter addended by: Kriss Delgadillo CMA on: 11/15/2018 10:05 AM      Actions taken: Charge Capture section accepted

## 2021-07-03 NOTE — ADDENDUM NOTE
Addendum Note by Kriss Delgadillo CMA at 1/14/2020 10:20 AM     Author: Kriss Delgadillo CMA Service: -- Author Type: Certified Medical Assistant    Filed: 1/17/2020  1:35 PM Date of Service: 1/14/2020 10:20 AM Status: Signed    : Kriss Delgadillo CMA (Certified Medical Assistant)    Encounter addended by: Kriss Delgadillo CMA on: 1/17/2020  1:35 PM      Actions taken: Charge Capture section accepted

## 2021-07-03 NOTE — ADDENDUM NOTE
Addendum Note by Kriss Delgadillo CMA at 9/11/2018 10:52 AM     Author: Kriss Delgadillo CMA Service: -- Author Type: Certified Medical Assistant    Filed: 9/11/2018 10:52 AM Date of Service: 9/11/2018 10:52 AM Status: Signed    : Kriss Delgadillo CMA (Certified Medical Assistant)    Encounter addended by: Kriss Delgadillo CMA on: 9/11/2018 10:52 AM<BR>     Actions taken: Charge Capture section accepted

## 2021-07-03 NOTE — ADDENDUM NOTE
Addendum Note by Kriss Delgadillo CMA at 2/13/2018 12:12 PM     Author: Kriss Delgadillo CMA Service: -- Author Type: Certified Medical Assistant    Filed: 2/13/2018 12:12 PM Date of Service: 2/13/2018 12:12 PM Status: Signed    : Kriss Delgadillo CMA (Certified Medical Assistant)    Encounter addended by: Kriss Delgadillo CMA on: 2/13/2018 12:12 PM<BR>     Actions taken: Charge Capture section accepted

## 2021-07-03 NOTE — ADDENDUM NOTE
Addendum Note by Kriss Delgadillo CMA at 1/2/2018 12:22 PM     Author: Kriss Delgadillo CMA Service: -- Author Type: Certified Medical Assistant    Filed: 1/2/2018 12:22 PM Date of Service: 1/2/2018 12:22 PM Status: Signed    : Kriss Delgadillo CMA (Certified Medical Assistant)    Encounter addended by: Kriss Delgadillo CMA on: 1/2/2018 12:22 PM<BR>     Actions taken: Charge Capture section accepted

## 2021-07-03 NOTE — ADDENDUM NOTE
Addendum Note by Cayla Ma, Ph.D,LP at 2/2/2018 11:59 PM     Author: Cayla Ma, Ph.D,LP Service: -- Author Type: Psychologist    Filed: 2/8/2018 10:00 AM Date of Service: 2/2/2018 11:59 PM Status: Signed    : Cayla Ma, Ph.D,LP (Psychologist)    Encounter addended by: Cayla Ma, Ph.D,LP on: 2/8/2018 10:00 AM<BR>     Actions taken: Sign clinical note

## 2021-07-03 NOTE — ADDENDUM NOTE
Addendum Note by Carmina Guzman OT at 1/29/2018 11:38 AM     Author: Carmina Guzman OT Service: -- Author Type: Occupational Therapist    Filed: 1/29/2018 11:38 AM Date of Service: 1/29/2018 11:38 AM Status: Signed    : Carmina Guzman OT (Occupational Therapist)    Encounter addended by: Carmina Guzman OT on: 1/29/2018 11:38 AM<BR>     Actions taken: Sign clinical note

## 2021-07-19 DIAGNOSIS — F06.30 MOOD DISORDER IN CONDITIONS CLASSIFIED ELSEWHERE: ICD-10-CM

## 2021-07-19 RX ORDER — VENLAFAXINE HYDROCHLORIDE 75 MG/1
CAPSULE, EXTENDED RELEASE ORAL
Qty: 31 CAPSULE | Refills: 11 | Status: SHIPPED | OUTPATIENT
Start: 2021-07-19 | End: 2023-02-16 | Stop reason: DRUGHIGH

## 2021-08-05 ENCOUNTER — VIRTUAL VISIT (OUTPATIENT)
Dept: NEUROLOGY | Facility: CLINIC | Age: 31
End: 2021-08-05
Payer: MEDICARE

## 2021-08-05 DIAGNOSIS — F06.30 MOOD DISORDER IN CONDITIONS CLASSIFIED ELSEWHERE: Primary | ICD-10-CM

## 2021-08-05 PROCEDURE — 99443 PR PHYSICIAN TELEPHONE EVALUATION 21-30 MIN: CPT | Mod: 95 | Performed by: PSYCHIATRY & NEUROLOGY

## 2021-08-05 NOTE — PROGRESS NOTES
"Telephone Visit  Nicholas Daugherty is a 31 year old male who is being evaluated via a billable telephone visit in light of the ongoing global health crisis (COVID-19) that requires us to abide by social distancing mandates in order to reduce the risk of COVID-19 exposure.       The patient has been notified of following:     \"This telephone visit will be conducted via a phone call between you and your physician/provider. We have found that certain health care needs can be provided without the need for a physical exam.  This service lets us provide the care you need with a short phone conversation.  If a prescription is necessary we can send it directly to your pharmacy.  If lab work is needed we can place an order for that and you can then stop by our lab to have the test done at a later time.    If during the course of the call the physician/provider feels a telephone visit is not appropriate, you will not be charged for this service.\"       Patient has given verbal consent to a Telephone visit? 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 Yes      Side Effects: No    Pain (0-10) No   Appetite change No   Sleep disturbance No   Change in energy Yes   Change in interest Yes   Change in concentration No   Psychosis/Hallucinations No   Negative thoughts Yes   Mood swings Yes   Alcohol use No   Drug use No   Anxiety Yes   Sad/depressed mood Yes                                                        Phone Start Time: 1:20pm    Phone End Time:  1:25pm    Total time of phone conversation: 5 minutes    Phone number patient would like to use [phone]    Kriss Rucker CMA         There were no vitals filed for this visit.    Outpatient Followup TBI Evaluation        Pertinent History:    The " patient was referred to this clinic for further assessment and treatment . Patient first saw me January 2 of 2018.  The patient had been followed at that time by Dr. Nguyen in the primary care clinic.  The patient has a history of Werner's syndrome diagnosed at age 3.  He had been followed by pediatric neurology.  He been on multiple medications over the years.  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  This was to control seizures and it had a positive effect on that.  The patient has had motor dysfunction and required a tendon release procedure.  When I first saw the patient he was on a combination of Prozac, Keppra and trazodone but continued to struggle with depression.  At that visit we did taper off the Prozac and then started the patient on low-dose venlafaxine.       I also had the patient go for neuropsychological testing.  Please see that data in the chart.      In spring 2018 we did increase the patient's Effexor XL.  In May 2018 we increased the Effexor further to 225 mg a day.     I saw the patient in September 2018 and at that time we did not make any medication changes.     I saw the patient in November 2019.  At that time he was complaining of lower motivation and some difficulty with sleep and poor sleep hygiene.  There were some more situational stressors including the death of his girlfriends mother.  We did did increase the patient's Effexor to 300 mg a day.     I saw the patient in April 2019.  He was doing well at that time.  The increase in Effexor had been helpful.  We did not make any medication changes at that time.     I saw the patient in October 2019.  He had been furloughed from work and was looking for another job.  He was a bit more depressed and frustrated.  His grandmother was dying with dementia which was difficult for him.  His brother was being deployed to [place].  We did increase his Effexor at that time to 375 mg a da I saw the patient in January 2020.  The patient was doing well at that time we did not make any medication changes.     I interviewed the patient as well as a staff member by phone in September 2020.  The patient believes the meds were somewhat low and both he and the staff are requesting to continue with current treatment plan.  We did not make any changes at that visit.        I saw the patient in March 2021.Patient presents today for the purposes of medication management.  He was doing well at that time.  He had recently returned from a family trip to [place].  He was tolerating the medication.  We did not make any changes.      HPI:  Patient presents today for the purposes of medication management.  The patient presents today stating he is doing quite well.  He recently started a new job at [employer] and states because of that he is a bit tired.  He is hoping to get out in the community a bit more now that the social isolation is improving after coronavirus.  He states his mood is quite good.  No desire to be dead or thoughts of harming himself.  No hallucinations or delusions.  He reports no new medical diagnoses or treatments.  No new allergies.  He sleeping well and his appetite is good.  No problems with concentration.  He desires to continue with the current treatment plan as he is tolerating the medication quite well.       We discussed some treatment options and have elected to continue with the current medications.      Current Medications: Please see chart. Medications personally reviewed.    There are no problems to display for this patient.    No past medical history on file.  No past surgical history on file.  No family history on file.  Current Outpatient Medications   Medication Sig Dispense Refill     bacitracin zinc 500 unit/gram Pack [BACITRACIN ZINC 500 UNIT/GRAM PACK] Apply 1 packet topically.       carbamide peroxide (DEBROX) 6.5 % otic solution [CARBAMIDE PEROXIDE (DEBROX) 6.5 % OTIC SOLUTION] Administer 10 drops into  both ears as needed.       dextromethorphan-guaiFENesin (ROBITUSSIN-DM)  mg/5 mL liquid [DEXTROMETHORPHAN-GUAIFENESIN (ROBITUSSIN-DM)  MG/5 ML LIQUID] Take 10 mL by mouth every 12 (twelve) hours.       docusate sodium (COLACE) 100 MG capsule [DOCUSATE SODIUM (COLACE) 100 MG CAPSULE] Take 100 mg by mouth 2 (two) times a day as needed for constipation.       hydrocortisone 1 % cream [HYDROCORTISONE 1 % CREAM] Apply topically 3 (three) times a day.       ibuprofen (ADVIL,MOTRIN) 200 MG tablet [IBUPROFEN (ADVIL,MOTRIN) 200 MG TABLET] Take 400 mg by mouth every 6 (six) hours as needed for pain.       levETIRAcetam (KEPPRA) 750 MG tablet [LEVETIRACETAM (KEPPRA) 750 MG TABLET] Take 3,000 mg by mouth daily.       loperamide (IMODIUM) 2 mg capsule [LOPERAMIDE (IMODIUM) 2 MG CAPSULE] Take 2 mg by mouth 4 (four) times a day as needed for diarrhea.       psyllium with dextrose (METAMUCIL JAR) powder [PSYLLIUM WITH DEXTROSE (METAMUCIL JAR) POWDER] Take by mouth 3 (three) times a day as needed.       traZODone (DESYREL) 50 MG tablet [TRAZODONE (DESYREL) 50 MG TABLET] Take 50 mg by mouth at bedtime.       venlafaxine (EFFEXOR-XR) 150 MG 24 hr capsule [VENLAFAXINE (EFFEXOR-XR) 150 MG 24 HR CAPSULE] TAKE 2 CAPSULES (300MG) BY MOUTH ONCE DAILY 60 capsule 11     venlafaxine (EFFEXOR-XR) 75 MG 24 hr capsule TAKE 1 CAPSULE BY MOUTH ONCE DAILY (ALONG WITH 150MG CAPSULES FOR A TOTAL DOSE OF 375MG) 31 capsule 11       Allergies   Allergen Reactions     Tegretol [Carbamazepine] Unknown     Social History     Socioeconomic History     Marital status: Single     Spouse name: Not on file     Number of children: Not on file     Years of education: Not on file     Highest education level: Not on file   Occupational History     Not on file   Tobacco Use     Smoking status: Not on file   Substance and Sexual Activity     Alcohol use: Not on file     Drug use: Not on file     Sexual activity: Not on file   Other Topics Concern     Not  on file   Social History Narrative     Not on file     Social Determinants of Health     Financial Resource Strain:      Difficulty of Paying Living Expenses:    Food Insecurity:      Worried About Running Out of Food in the Last Year:      Ran Out of Food in the Last Year:    Transportation Needs:      Lack of Transportation (Medical):      Lack of Transportation (Non-Medical):    Physical Activity:      Days of Exercise per Week:      Minutes of Exercise per Session:    Stress:      Feeling of Stress :    Social Connections:      Frequency of Communication with Friends and Family:      Frequency of Social Gatherings with Friends and Family:      Attends Hindu Services:      Active Member of Clubs or Organizations:      Attends Club or Organization Meetings:      Marital Status:    Intimate Partner Violence:      Fear of Current or Ex-Partner:      Emotionally Abused:      Physically Abused:      Sexually Abused:        The following portions of the patient's history were reviewed and updated as appropriate: allergies, current medications, past family history, past medical history, past social history, past surgical history and problem list.    Review of Systems  A comprehensive review of systems was negative except for what is noted above    Mental Status Exam:   Appearance: .  The patient was interviewed by phone  Behavior:   .  Cooperative, pleasant and polite.  No irritability or agitation.  Speech: .  7 structure was intact.  Not pressured or rambling.  He was able to communicate effectively.  Mood/Affect:   .. No evidence of any depression or anxiety.  No irritability or lability  Thought Content: .  No evidence of any psychosis   Suicidal or Homicidal Thoughts:   .  None apparent or reported  Thought Process/Formulation:  .  Tracking adequately.  Not loose.  Not disorganized.  No racing thoughts.  Associations: .  Grossly adequate  Fund of Knowledge:  .  Grossly adequate  Attention/Concentration: .   Attentive and tracking well.  Not disorganized.  Insight: .  Grossly intact  Judgement:  .  Grossly intact  Memory:   . . No reported changes.  Appears adequate.   Motor Status:  .  Patient denies any new tremors or asymmetries  Orientation: .  Grossly oriented.     Diagnosis managed and treated at today's visit :    Major neurocognitive disorder  Werner's syndrome  Ccmplete right hemispherectomy as a child due to intractable seizures     Plan:  Medication Adjustment:  I will make no psychotropic medication changes at this time.    Other:   Patient will return to clinic in  3 months.  He agrees to call or return sooner with any questions or concerns.  Risks and benefits were discussed.  Continue with his individual therapist.     Continue with the support of the clinic, reassurance, and redirection. Staff monitoring and ongoing assessments per team plan. Current psychotropic medication appears to represent the minimum effective dosage and appears medically necessary. We will continue to monitor and reassess. This team will utilize appropriate emergency services if necessary. I will make myself available if concerns or problems arise.    Total time spent with the patient today was 32 minutes with greater than 50% of the time spent in counseling and care coordination. The patient agrees to call before then with any questions, concerns or problems. We will assess for the appropriateness of possible psychotropic medication trials/changes. The patient will seek out appropriate emergency services should that become necessary.    Consent was obtained for this service by one of our care team members    Telephone Visit Details    Type of service: Telephone Visit    Phone Start Time: 1:25 PM    Phone End Time: 1:40 PM    Total time for phone call: 15 minutes    Originating Location: Patient's home    Distant Location:  [facility] [place]    Mode of Communication: Telephone call      Total time  for record review, patient interview and documentation is 32 minutes.      Patient Instructions   It was nice speaking with you today for our office visit held over the phone. The following is a summary of our visit.    General Information:      If lab work was done today as part of your evaluation you will generally be contacted via My Chart, mail, or phone with the results within 1-5 days. If there is an alarming result we will contact you by phone. Lab results come back at varying times, I generally wait until all labs are resulted before making comments on results. Please note labs are automatically released to My Chart once available.     If you need refills please contact your pharmacist. They will send a refill request to me to review. Please allow 3 business days for us to process all refill requests.     Please call or send a medical message through My Chart, with any questions or concerns    If you need any paperwork completed please fax forms to [phone]. Please state if you would like a copy of the completed paperwork, mailed or faxed back to the patient and a fax number to fax the paperwork to. Please allow up to 10 days for paperwork to be completed.    Ren Manuel MD

## 2021-08-05 NOTE — LETTER
"    8/5/2021         RE: Nicholas Daugherty  [address]  [address]  [facility] [address]        Dear Colleague,    Thank you for referring your patient, Nichloas Daugherty, to the [facility]. Please see a copy of my visit note below.    Telephone Visit  Nicholas Daugherty is a 31 year old male who is being evaluated via a billable telephone visit in light of the ongoing global health crisis (COVID-19) that requires us to abide by social distancing mandates in order to reduce the risk of COVID-19 exposure.       The patient has been notified of following:     \"This telephone visit will be conducted via a phone call between you and your physician/provider. We have found that certain health care needs can be provided without the need for a physical exam.  This service lets us provide the care you need with a short phone conversation.  If a prescription is necessary we can send it directly to your pharmacy.  If lab work is needed we can place an order for that and you can then stop by our lab to have the test done at a later time.    If during the course of the call the physician/provider feels a telephone visit is not appropriate, you will not be charged for this service.\"       Patient has given verbal consent to a Telephone visit? 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 Yes      Side Effects: No    Pain (0-10) No   Appetite change No   Sleep disturbance No   Change in energy Yes   Change in interest Yes   Change in concentration No   Psychosis/Hallucinations No   Negative thoughts Yes   Mood swings Yes   Alcohol use No   Drug use No   Anxiety Yes   Sad/depressed mood Yes                                                        Phone Start Time: 1:20pm    Phone End Time:  " 1:25pm    Total time of phone conversation: 5 minutes    Phone number patient would like to use [phone]    Kriss Rucker CMA         There were no vitals filed for this visit.    Outpatient Followup TBI Evaluation        Pertinent History:    The patient was referred to this clinic for further assessment and treatment . Patient first saw me January 2 of 2018.  The patient had been followed at that time by Dr. Nguyen in the primary care clinic.  The patient has a history of Werner's syndrome diagnosed at age 3.  He had been followed by pediatric neurology.  He been on multiple medications over the years.  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  This was to control seizures and it had a positive effect on that.  The patient has had motor dysfunction and required a tendon release procedure.  When I first saw the patient he was on a combination of Prozac, Keppra and trazodone but continued to struggle with depression.  At that visit we did taper off the Prozac and then started the patient on low-dose venlafaxine.       I also had the patient go for neuropsychological testing.  Please see that data in the chart.      In spring 2018 we did increase the patient's Effexor XL.  In May 2018 we increased the Effexor further to 225 mg a day.     I saw the patient in September 2018 and at that time we did not make any medication changes.     I saw the patient in November 2019.  At that time he was complaining of lower motivation and some difficulty with sleep and poor sleep hygiene.  There were some more situational stressors including the death of his girlfriends mother.  We did did increase the patient's Effexor to 300 mg a day.     I saw the patient in April 2019.  He was doing well at that time.  The increase in Effexor had been helpful.  We did not make any medication changes at that time.     I saw the patient in October 2019.  He had been furloughed from work and was looking for another  job.  He was a bit more depressed and frustrated.  His grandmother was dying with dementia which was difficult for him.  His brother was being deployed to [place].  We did increase his Effexor at that time to 375 mg a da I saw the patient in January 2020. The patient was doing well at that time we did not make any medication changes.     I interviewed the patient as well as a staff member by phone in September 2020.  The patient believes the meds were somewhat low and both he and the staff are requesting to continue with current treatment plan.  We did not make any changes at that visit.        I saw the patient in March 2021.Patient presents today for the purposes of medication management.  He was doing well at that time.  He had recently returned from a family trip to [place].  He was tolerating the medication.  We did not make any changes.      HPI:  Patient presents today for the purposes of medication management.  The patient presents today stating he is doing quite well.  He recently started a new job at [employer] and states because of that he is a bit tired.  He is hoping to get out in the community a bit more now that the social isolation is improving after coronavirus.  He states his mood is quite good.  No desire to be dead or thoughts of harming himself.  No hallucinations or delusions.  He reports no new medical diagnoses or treatments.  No new allergies.  He sleeping well and his appetite is good.  No problems with concentration.  He desires to continue with the current treatment plan as he is tolerating the medication quite well.       We discussed some treatment options and have elected to continue with the current medications.      Current Medications: Please see chart. Medications personally reviewed.    There are no problems to display for this patient.    No past medical history on file.  No past surgical history on file.  No family history on file.  Current Outpatient Medications    Medication Sig Dispense Refill     bacitracin zinc 500 unit/gram Pack [BACITRACIN ZINC 500 UNIT/GRAM PACK] Apply 1 packet topically.       carbamide peroxide (DEBROX) 6.5 % otic solution [CARBAMIDE PEROXIDE (DEBROX) 6.5 % OTIC SOLUTION] Administer 10 drops into both ears as needed.       dextromethorphan-guaiFENesin (ROBITUSSIN-DM)  mg/5 mL liquid [DEXTROMETHORPHAN-GUAIFENESIN (ROBITUSSIN-DM)  MG/5 ML LIQUID] Take 10 mL by mouth every 12 (twelve) hours.       docusate sodium (COLACE) 100 MG capsule [DOCUSATE SODIUM (COLACE) 100 MG CAPSULE] Take 100 mg by mouth 2 (two) times a day as needed for constipation.       hydrocortisone 1 % cream [HYDROCORTISONE 1 % CREAM] Apply topically 3 (three) times a day.       ibuprofen (ADVIL,MOTRIN) 200 MG tablet [IBUPROFEN (ADVIL,MOTRIN) 200 MG TABLET] Take 400 mg by mouth every 6 (six) hours as needed for pain.       levETIRAcetam (KEPPRA) 750 MG tablet [LEVETIRACETAM (KEPPRA) 750 MG TABLET] Take 3,000 mg by mouth daily.       loperamide (IMODIUM) 2 mg capsule [LOPERAMIDE (IMODIUM) 2 MG CAPSULE] Take 2 mg by mouth 4 (four) times a day as needed for diarrhea.       psyllium with dextrose (METAMUCIL JAR) powder [PSYLLIUM WITH DEXTROSE (METAMUCIL JAR) POWDER] Take by mouth 3 (three) times a day as needed.       traZODone (DESYREL) 50 MG tablet [TRAZODONE (DESYREL) 50 MG TABLET] Take 50 mg by mouth at bedtime.       venlafaxine (EFFEXOR-XR) 150 MG 24 hr capsule [VENLAFAXINE (EFFEXOR-XR) 150 MG 24 HR CAPSULE] TAKE 2 CAPSULES (300MG) BY MOUTH ONCE DAILY 60 capsule 11     venlafaxine (EFFEXOR-XR) 75 MG 24 hr capsule TAKE 1 CAPSULE BY MOUTH ONCE DAILY (ALONG WITH 150MG CAPSULES FOR A TOTAL DOSE OF 375MG) 31 capsule 11       Allergies   Allergen Reactions     Tegretol [Carbamazepine] Unknown     Social History     Socioeconomic History     Marital status: Single     Spouse name: Not on file     Number of children: Not on file     Years of education: Not on file     Highest  education level: Not on file   Occupational History     Not on file   Tobacco Use     Smoking status: Not on file   Substance and Sexual Activity     Alcohol use: Not on file     Drug use: Not on file     Sexual activity: Not on file   Other Topics Concern     Not on file   Social History Narrative     Not on file     Social Determinants of Health     Financial Resource Strain:      Difficulty of Paying Living Expenses:    Food Insecurity:      Worried About Running Out of Food in the Last Year:      Ran Out of Food in the Last Year:    Transportation Needs:      Lack of Transportation (Medical):      Lack of Transportation (Non-Medical):    Physical Activity:      Days of Exercise per Week:      Minutes of Exercise per Session:    Stress:      Feeling of Stress :    Social Connections:      Frequency of Communication with Friends and Family:      Frequency of Social Gatherings with Friends and Family:      Attends [employer] Services:      Active Member of Clubs or Organizations:      Attends Club or Organization Meetings:      Marital Status:    Intimate Partner Violence:      Fear of Current or Ex-Partner:      Emotionally Abused:      Physically Abused:      Sexually Abused:        The following portions of the patient's history were reviewed and updated as appropriate: allergies, current medications, past family history, past medical history, past social history, past surgical history and problem list.    Review of Systems  A comprehensive review of systems was negative except for what is noted above    Mental Status Exam:   Appearance: .  The patient was interviewed by phone  Behavior:   .  Cooperative, pleasant and polite.  No irritability or agitation.  Speech: .  7 structure was intact.  Not pressured or rambling.  He was able to communicate effectively.  Mood/Affect:   .. No evidence of any depression or anxiety.  No irritability or lability  Thought Content: .  No evidence of any psychosis   Suicidal or  Homicidal Thoughts:   .  None apparent or reported  Thought Process/Formulation:  .  Tracking adequately.  Not loose.  Not disorganized.  No racing thoughts.  Associations: .  Grossly adequate  Fund of Knowledge:  .  Grossly adequate  Attention/Concentration: .  Attentive and tracking well.  Not disorganized.  Insight: .  Grossly intact  Judgement:  .  Grossly intact  Memory:   . . No reported changes.  Appears adequate.   Motor Status:  .  Patient denies any new tremors or asymmetries  Orientation: .  Grossly oriented.     Diagnosis managed and treated at today's visit :    Major neurocognitive disorder  Werner's syndrome  Ccmplete right hemispherectomy as a child due to intractable seizures     Plan:  Medication Adjustment:  I will make no psychotropic medication changes at this time.    Other:   Patient will return to clinic in  3 months.  He agrees to call or return sooner with any questions or concerns.  Risks and benefits were discussed.  Continue with his individual therapist.     Continue with the support of the clinic, reassurance, and redirection. Staff monitoring and ongoing assessments per team plan. Current psychotropic medication appears to represent the minimum effective dosage and appears medically necessary. We will continue to monitor and reassess. This team will utilize appropriate emergency services if necessary. I will make myself available if concerns or problems arise.    Total time spent with the patient today was 32 minutes with greater than 50% of the time spent in counseling and care coordination. The patient agrees to call before then with any questions, concerns or problems. We will assess for the appropriateness of possible psychotropic medication trials/changes. The patient will seek out appropriate emergency services should that become necessary.    Consent was obtained for this service by one of our care team members    Telephone Visit Details    Type of service: Telephone  Visit    Phone Start Time: 1:25 PM    Phone End Time: 1:40 PM    Total time for phone call: 15 minutes    Originating Location: Patient's home    Distant Location:  [facility]    Mode of Communication: Telephone call      Total time for record review, patient interview and documentation is 32 minutes.      Patient Instructions   It was nice speaking with you today for our office visit held over the phone. The following is a summary of our visit.    General Information:      If lab work was done today as part of your evaluation you will generally be contacted via My Chart, mail, or phone with the results within 1-5 days. If there is an alarming result we will contact you by phone. Lab results come back at varying times, I generally wait until all labs are resulted before making comments on results. Please note labs are automatically released to My Chart once available.     If you need refills please contact your pharmacist. They will send a refill request to me to review. Please allow 3 business days for us to process all refill requests.     Please call or send a medical message through My Chart, with any questions or concerns    If you need any paperwork completed please fax forms to [phone]. Please state if you would like a copy of the completed paperwork, mailed or faxed back to the patient and a fax number to fax the paperwork to. Please allow up to 10 days for paperwork to be completed.    Ren Manuel MD        Again, thank you for allowing me to participate in the care of your patient.        Sincerely,        Ren Manuel MD

## 2021-08-21 ENCOUNTER — HEALTH MAINTENANCE LETTER (OUTPATIENT)
Age: 31
End: 2021-08-21

## 2021-10-16 ENCOUNTER — HEALTH MAINTENANCE LETTER (OUTPATIENT)
Age: 31
End: 2021-10-16

## 2022-04-26 ENCOUNTER — VIRTUAL VISIT (OUTPATIENT)
Dept: NEUROLOGY | Facility: CLINIC | Age: 32
End: 2022-04-26
Payer: MEDICARE

## 2022-04-26 DIAGNOSIS — R41.89 NEUROCOGNITIVE DEFICITS: Primary | ICD-10-CM

## 2022-04-26 DIAGNOSIS — R29.818 NEUROCOGNITIVE DEFICITS: Primary | ICD-10-CM

## 2022-04-26 PROCEDURE — 99214 OFFICE O/P EST MOD 30 MIN: CPT | Mod: 95 | Performed by: PSYCHIATRY & NEUROLOGY

## 2022-04-26 NOTE — PROGRESS NOTES
"Nicholas Daugherty is a 32 year old male who is being evaluated via a billable video visit in light of the ongoing global health crisis (COVID-19) that requires us to abide by social distancing mandates in order to reduce the risk of COVID-19 exposure.       The patient has been notified of following:     \"This video visit will be conducted via a video call between you and your physician/provider. We have found that certain health care needs can be provided without the need for a physical exam.  This service lets us provide the care you need with a short phone/video conversation.  If a prescription is necessary we can send it directly to your pharmacy.  If lab work is needed we can place an order for that and you can then stop by our lab to have the test done at a later time.    If during the course of the call the physician/provider feels a video visit is not appropriate, you will not be charged for this service.\"     Patient has given verbal consent to a video visit? Yes    Consent was obtained for this service by one of our care team members      Any new medication (other provider):   No   Meds started at last appointment:  No   Results: N/A  Meds increased/decreased at last appointment:    No   Results: N/A     Patient's impression of how medication is working? Working fine      Compliant with Medication? Yes       Side Effects: No    Pain (0-10) No   Appetite change No   Sleep disturbance No   Change in energy No   Change in interest No   Change in concentration No   Psychosis/Hallucinations No   Negative thoughts No   Mood swings Yes   Alcohol use No   Drug use No   Anxiety Yes   Sad/depressed mood  No     Questions or concerns?: No                                                          Phone Start Time: 8:44 AM    Phone End Time:  8:49AM    Total time of phone conversation: 5 minutes    There were no vitals filed for this visit.    Patient would like the video invitation sent by: [employer]   Number/e-mail address: "   [phone]       JU Whitaker    Outpatient Follow up TBI Evaluation     Pertinent History:  The patient was referred to this clinic for further assessment and treatment . Patient first saw me January 2 of 2018.  The patient had been followed at that time by Dr. Nguyen in the primary care clinic.  The patient has a history of Werner's syndrome diagnosed at age 3.  He had been followed by pediatric neurology.  He been on multiple medications over the years.  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  This was to control seizures and it had a positive effect on that.  The patient has had motor dysfunction and required a tendon release procedure.  When I first saw the patient he was on a combination of Prozac, Keppra and trazodone but continued to struggle with depression.  At that visit we did taper off the Prozac and then started the patient on low-dose venlafaxine.       I also had the patient go for neuropsychological testing.  Please see that data in the chart.      In spring 2018 we did increase the patient's Effexor XL.  In May 2018 we increased the Effexor further to 225 mg a day.     I saw the patient in September 2018 and at that time we did not make any medication changes.     I saw the patient in November 2019.  At that time he was complaining of lower motivation and some difficulty with sleep and poor sleep hygiene.  There were some more situational stressors including the death of his girlfriends mother.  We did did increase the patient's Effexor to 300 mg a day.     I saw the patient in April 2019.  He was doing well at that time.  The increase in Effexor had been helpful.  We did not make any medication changes at that time.     I saw the patient in October 2019.  He had been furloughed from work and was looking for another job.  He was a bit more depressed and frustrated.  His grandmother was dying with dementia which was difficult for him.  His brother was being deployed  to [place].  We did increase his Effexor at that time to 375 mg a da I saw the patient in January 2020. The patient was doing well at that time we did not make any medication changes.     I interviewed the patient as well as a staff member by phone in September 2020.  The patient believes the meds were somewhat low and both he and the staff are requesting to continue with current treatment plan.  We did not make any changes at that visit.        I saw the patient in March 2021.Patient presents today for the purposes of medication management.  He was doing well at that time.  He had recently returned from a family trip to [place].  He was tolerating the medication.  We did not make any changes.    I saw the patient in August 2021.  He was doing well and he recently started a job at the [employer].  His social isolation was improving.  His mood was good.  He was tolerating the medication and we did not make any medication changes at that visit.    HPI:  Patient presents today for the purposes of medication management.   I had an opportunity to interview the patient as well as the staff by phone and both reported the patient was doing extremely well and tolerating the medication.  They had no specific concerns or complaints.    The patient reports his mood has been good.  No depression or anxiety.  No irritability.  No tamiko.  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  He is looking forward to that.    He reports he is sleeping well and eating well.  No change in cognition or concentration.  He states he has had no hallucinations or delusions.  There is been no new medical issues or concerns.  He has been tolerating the seizure medication with no seizures or any change in his medical status.  He continues to get  labs through his medical team.  Both he and his staff feel that things are stable and neither want any medication changes at this time.  They have no other questions or concerns.     We discussed some treatment options and have elected to continue with the current treatment plan.      Current Medications: Please see chart. Medications personally reviewed.     There are no problems to display for this patient.    No past medical history on file.  No past surgical history on file.  No family history on file.  Current Outpatient Medications   Medication Sig Dispense Refill     carbamide peroxide (DEBROX) 6.5 % otic solution [CARBAMIDE PEROXIDE (DEBROX) 6.5 % OTIC SOLUTION] Administer 10 drops into both ears as needed.       dextromethorphan-guaiFENesin (ROBITUSSIN-DM)  mg/5 mL liquid [DEXTROMETHORPHAN-GUAIFENESIN (ROBITUSSIN-DM)  MG/5 ML LIQUID] Take 10 mL by mouth every 12 (twelve) hours.       docusate sodium (COLACE) 100 MG capsule [DOCUSATE SODIUM (COLACE) 100 MG CAPSULE] Take 100 mg by mouth 2 (two) times a day as needed for constipation.       hydrocortisone 1 % cream [HYDROCORTISONE 1 % CREAM] Apply topically 3 (three) times a day.       ibuprofen (ADVIL,MOTRIN) 200 MG tablet [IBUPROFEN (ADVIL,MOTRIN) 200 MG TABLET] Take 400 mg by mouth every 6 (six) hours as needed for pain.       levETIRAcetam (KEPPRA) 750 MG tablet [LEVETIRACETAM (KEPPRA) 750 MG TABLET] Take 3,000 mg by mouth daily.       loperamide (IMODIUM) 2 mg capsule [LOPERAMIDE (IMODIUM) 2 MG CAPSULE] Take 2 mg by mouth 4 (four) times a day as needed for diarrhea.       psyllium with dextrose (METAMUCIL JAR) powder [PSYLLIUM WITH DEXTROSE (METAMUCIL JAR) POWDER] Take by mouth 3 (three) times a day as needed.       traZODone (DESYREL) 50 MG tablet [TRAZODONE (DESYREL) 50 MG TABLET] Take 50 mg by mouth at bedtime.       venlafaxine (EFFEXOR-XR) 150 MG 24 hr capsule [VENLAFAXINE (EFFEXOR-XR) 150 MG 24 HR CAPSULE] TAKE 2 CAPSULES (300MG) BY  MOUTH ONCE DAILY 60 capsule 11     venlafaxine (EFFEXOR-XR) 75 MG 24 hr capsule TAKE 1 CAPSULE BY MOUTH ONCE DAILY (ALONG WITH 150MG CAPSULES FOR A TOTAL DOSE OF 375MG) 31 capsule 11     bacitracin zinc 500 unit/gram Pack [BACITRACIN ZINC 500 UNIT/GRAM PACK] Apply 1 packet topically. (Patient not taking: Reported on 4/26/2022)         Allergies   Allergen Reactions     Tegretol [Carbamazepine] Unknown     Social History     Socioeconomic History     Marital status: Single     Spouse name: Not on file     Number of children: Not on file     Years of education: Not on file     Highest education level: Not on file   Occupational History     Not on file   Tobacco Use     Smoking status: Never Smoker     Smokeless tobacco: Never Used   Substance and Sexual Activity     Alcohol use: Not on file     Drug use: Not on file     Sexual activity: Not on file   Other Topics Concern     Not on file   Social History Narrative     Not on file     Social Determinants of Health     Financial Resource Strain: Not on file   Food Insecurity: Not on file   Transportation Needs: Not on file   Physical Activity: Not on file   Stress: Not on file   Social Connections: Not on file   Intimate Partner Violence: Not on file   Housing Stability: Not on file       The following portions of the patient's history were reviewed and updated as appropriate: allergies, current medications, past family history, past medical history, past social history, past surgical history and problem list.    Review of Systems  A comprehensive review of systems was negative except for what is noted above    Mental Status Exam  Alertness: Alert smiling and comfortable but the patient was wearing a mask.  No evidence of any tiredness.  Appearance: Comfortable and calm.  No abnormal movements  Behavior/Demeanor: Pleasant and polite.  Able to joke.  No reports of any behavioral difficulties.  Speech: Sentence structure was intact.  Not pressured or rambling.  Answers  were consistent and appropriate.  Psychomotor: No slowing.  No agitation or irritability.  Mood: Bright and smiling.  No depression.  No tamiko.  No irritability.  Affect: Bright with no lability or irritability  Thought Process/Associations: Tracking adequately.  Perhaps baseline slightly slowed.  Thought Content: No hallucinations or delusions  Perception: Grossly intact  Insight: No change.  Grossly intact  Judgment: Grossly intact  Attention/Concentration: Attentive and tracking well.  No reports of any difficulties.  Language: Grossly intact  Fund of Knowledge: Grossly intact  Memory: He reports no changes.  No obvious changes.       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     Plan:  Medication Adjustment:  We will continue with the current medications    Other:   Patient will return to clinic in  3 to 4 months. They agree to call or return sooner with any questions or concerns.  Risks and benefits were discussed.  Continue with his individual therapist.     Continue with the support of the clinic, reassurance, and redirection. Staff monitoring and ongoing assessments per team plan. Current psychotropic medication appears to represent the minimum effective dosage and appears medically necessary. We will continue to monitor and reassess. This team will utilize appropriate emergency services if necessary. I will make myself available if concerns or problems arise.    Total time spent with the patient today was 26 minutes with greater than 50% of the time spent in counseling and care coordination. The patient agrees to call before then with any questions, concerns or problems. We will assess for the appropriateness of possible psychotropic medication trials/changes. The patient will seek out appropriate emergency services should that become necessary.    Video Visit Details    Type of service: Video  Visit    Video Start Time: 8:58 AM    Video End Time: 9:14 AM    Total time for video call: 16 minutes    Originating Location: Patient's home    Distant Location:  [facility]    Mode of Communication: Video call via [employer]      Total time for chart review, patient and staff interview, documentation and coordination of care is 26 minutes.    Patient Instructions   It was nice speaking with you today for our office video visit. The following is a summary of our visit.    General Information:    If lab work was done today as part of your evaluation you will generally be contacted via My Chart, mail, or phone with the results within 1-5 days. If there is an alarming result we will contact you by phone. Lab results come back at varying times, I generally wait until all labs are resulted before making comments on results. Please note labs are automatically released to My Chart once available.     If you need refills please contact your pharmacist. They will send a refill request to me to review. Please allow 3 business days for us to process all refill requests.     Please call or send a medical message through My Chart, with any questions or concerns.    If you need any paperwork completed please fax forms to [phone]. Please state if you would like a copy of the completed paperwork, mailed or faxed back to the patient and a fax number to fax the paperwork to. Please allow up to 10 business days for paperwork to be completed.    Ren Manuel MD

## 2022-04-26 NOTE — LETTER
"    4/26/2022         RE: Nicholas Daugherty  [address]  [address]  [facility] [address]        Dear Colleague,    Thank you for referring your patient, Nicholas Daugherty, to the [facility]. Please see a copy of my visit note below.    Nicholas Daugherty is a 32 year old male who is being evaluated via a billable video visit in light of the ongoing global health crisis (COVID-19) that requires us to abide by social distancing mandates in order to reduce the risk of COVID-19 exposure.       The patient has been notified of following:     \"This video visit will be conducted via a video call between you and your physician/provider. We have found that certain health care needs can be provided without the need for a physical exam.  This service lets us provide the care you need with a short phone/video conversation.  If a prescription is necessary we can send it directly to your pharmacy.  If lab work is needed we can place an order for that and you can then stop by our lab to have the test done at a later time.    If during the course of the call the physician/provider feels a video visit is not appropriate, you will not be charged for this service.\"     Patient has given verbal consent to a video visit? Yes    Consent was obtained for this service by one of our care team members      Any new medication (other provider):   No   Meds started at last appointment:  No   Results: N/A  Meds increased/decreased at last appointment:    No   Results: N/A     Patient's impression of how medication is working? Working fine      Compliant with Medication? Yes       Side Effects: No    Pain (0-10) No   Appetite change No   Sleep disturbance No   Change in energy No   Change in interest No   Change in concentration No   Psychosis/Hallucinations No   Negative thoughts No   Mood swings Yes   Alcohol use No   Drug use No   Anxiety Yes   Sad/depressed mood  No     Questions or concerns?: No                                  "                         Phone Start Time: 8:44 AM    Phone End Time:  8:49AM    Total time of phone conversation: 5 minutes    There were no vitals filed for this visit.    Patient would like the video invitation sent by: [employer]   Number/e-mail address:   [phone]       JU Whitaker    Outpatient Follow up TBI Evaluation     Pertinent History:  The patient was referred to this clinic for further assessment and treatment . Patient first saw me January 2 of 2018.  The patient had been followed at that time by Dr. Nguyen in the primary care clinic.  The patient has a history of Werner's syndrome diagnosed at age 3.  He had been followed by pediatric neurology.  He been on multiple medications over the years.  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  This was to control seizures and it had a positive effect on that.  The patient has had motor dysfunction and required a tendon release procedure.  When I first saw the patient he was on a combination of Prozac, Keppra and trazodone but continued to struggle with depression.  At that visit we did taper off the Prozac and then started the patient on low-dose venlafaxine.       I also had the patient go for neuropsychological testing.  Please see that data in the chart.      In spring 2018 we did increase the patient's Effexor XL.  In May 2018 we increased the Effexor further to 225 mg a day.     I saw the patient in September 2018 and at that time we did not make any medication changes.     I saw the patient in November 2019.  At that time he was complaining of lower motivation and some difficulty with sleep and poor sleep hygiene.  There were some more situational stressors including the death of his girlfriends mother.  We did did increase the patient's Effexor to 300 mg a day.     I saw the patient in April 2019.  He was doing well at that time.  The increase in Effexor had been helpful.  We did not make any medication changes at that  time.     I saw the patient in October 2019.  He had been furloughed from work and was looking for another job.  He was a bit more depressed and frustrated.  His grandmother was dying with dementia which was difficult for him.  His brother was being deployed to [place].  We did increase his Effexor at that time to 375 mg a da I saw the patient in January 2020. The patient was doing well at that time we did not make any medication changes.     I interviewed the patient as well as a staff member by phone in September 2020.  The patient believes the meds were somewhat low and both he and the staff are requesting to continue with current treatment plan.  We did not make any changes at that visit.        I saw the patient in March 2021.Patient presents today for the purposes of medication management.  He was doing well at that time.  He had recently returned from a family trip to [place].  He was tolerating the medication.  We did not make any changes.    I saw the patient in August 2021.  He was doing well and he recently started a job at the [employer].  His social isolation was improving.  His mood was good.  He was tolerating the medication and we did not make any medication changes at that visit.    HPI:  Patient presents today for the purposes of medication management.   I had an opportunity to interview the patient as well as the staff by phone and both reported the patient was doing extremely well and tolerating the medication.  They had no specific concerns or complaints.    The patient reports his mood has been good.  No depression or anxiety.  No irritability.  No tamiko.  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  He is looking forward to that.    He reports he is sleeping well and eating well.  No change  in cognition or concentration.  He states he has had no hallucinations or delusions.  There is been no new medical issues or concerns.  He has been tolerating the seizure medication with no seizures or any change in his medical status.  He continues to get labs through his medical team.  Both he and his staff feel that things are stable and neither want any medication changes at this time.  They have no other questions or concerns.     We discussed some treatment options and have elected to continue with the current treatment plan.      Current Medications: Please see chart. Medications personally reviewed.     There are no problems to display for this patient.    No past medical history on file.  No past surgical history on file.  No family history on file.  Current Outpatient Medications   Medication Sig Dispense Refill     carbamide peroxide (DEBROX) 6.5 % otic solution [CARBAMIDE PEROXIDE (DEBROX) 6.5 % OTIC SOLUTION] Administer 10 drops into both ears as needed.       dextromethorphan-guaiFENesin (ROBITUSSIN-DM)  mg/5 mL liquid [DEXTROMETHORPHAN-GUAIFENESIN (ROBITUSSIN-DM)  MG/5 ML LIQUID] Take 10 mL by mouth every 12 (twelve) hours.       docusate sodium (COLACE) 100 MG capsule [DOCUSATE SODIUM (COLACE) 100 MG CAPSULE] Take 100 mg by mouth 2 (two) times a day as needed for constipation.       hydrocortisone 1 % cream [HYDROCORTISONE 1 % CREAM] Apply topically 3 (three) times a day.       ibuprofen (ADVIL,MOTRIN) 200 MG tablet [IBUPROFEN (ADVIL,MOTRIN) 200 MG TABLET] Take 400 mg by mouth every 6 (six) hours as needed for pain.       levETIRAcetam (KEPPRA) 750 MG tablet [LEVETIRACETAM (KEPPRA) 750 MG TABLET] Take 3,000 mg by mouth daily.       loperamide (IMODIUM) 2 mg capsule [LOPERAMIDE (IMODIUM) 2 MG CAPSULE] Take 2 mg by mouth 4 (four) times a day as needed for diarrhea.       psyllium with dextrose (METAMUCIL JAR) powder [PSYLLIUM WITH DEXTROSE (METAMUCIL JAR) POWDER] Take by mouth 3 (three)  times a day as needed.       traZODone (DESYREL) 50 MG tablet [TRAZODONE (DESYREL) 50 MG TABLET] Take 50 mg by mouth at bedtime.       venlafaxine (EFFEXOR-XR) 150 MG 24 hr capsule [VENLAFAXINE (EFFEXOR-XR) 150 MG 24 HR CAPSULE] TAKE 2 CAPSULES (300MG) BY MOUTH ONCE DAILY 60 capsule 11     venlafaxine (EFFEXOR-XR) 75 MG 24 hr capsule TAKE 1 CAPSULE BY MOUTH ONCE DAILY (ALONG WITH 150MG CAPSULES FOR A TOTAL DOSE OF 375MG) 31 capsule 11     bacitracin zinc 500 unit/gram Pack [BACITRACIN ZINC 500 UNIT/GRAM PACK] Apply 1 packet topically. (Patient not taking: Reported on 4/26/2022)         Allergies   Allergen Reactions     Tegretol [Carbamazepine] Unknown     Social History     Socioeconomic History     Marital status: Single     Spouse name: Not on file     Number of children: Not on file     Years of education: Not on file     Highest education level: Not on file   Occupational History     Not on file   Tobacco Use     Smoking status: Never Smoker     Smokeless tobacco: Never Used   Substance and Sexual Activity     Alcohol use: Not on file     Drug use: Not on file     Sexual activity: Not on file   Other Topics Concern     Not on file   Social History Narrative     Not on file     Social Determinants of Health     Financial Resource Strain: Not on file   Food Insecurity: Not on file   Transportation Needs: Not on file   Physical Activity: Not on file   Stress: Not on file   Social Connections: Not on file   Intimate Partner Violence: Not on file   Housing Stability: Not on file       The following portions of the patient's history were reviewed and updated as appropriate: allergies, current medications, past family history, past medical history, past social history, past surgical history and problem list.    Review of Systems  A comprehensive review of systems was negative except for what is noted above    Mental Status Exam  Alertness: Alert smiling and comfortable but the patient was wearing a mask.  No evidence  of any tiredness.  Appearance: Comfortable and calm.  No abnormal movements  Behavior/Demeanor: Pleasant and polite.  Able to joke.  No reports of any behavioral difficulties.  Speech: Sentence structure was intact.  Not pressured or rambling.  Answers were consistent and appropriate.  Psychomotor: No slowing.  No agitation or irritability.  Mood: Bright and smiling.  No depression.  No tamiko.  No irritability.  Affect: Bright with no lability or irritability  Thought Process/Associations: Tracking adequately.  Perhaps baseline slightly slowed.  Thought Content: No hallucinations or delusions  Perception: Grossly intact  Insight: No change.  Grossly intact  Judgment: Grossly intact  Attention/Concentration: Attentive and tracking well.  No reports of any difficulties.  Language: Grossly intact  Fund of Knowledge: Grossly intact  Memory: He reports no changes.  No obvious changes.       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     Plan:  Medication Adjustment:  We will continue with the current medications    Other:   Patient will return to clinic in  3 to 4 months. They agree to call or return sooner with any questions or concerns.  Risks and benefits were discussed.  Continue with his individual therapist.     Continue with the support of the clinic, reassurance, and redirection. Staff monitoring and ongoing assessments per team plan. Current psychotropic medication appears to represent the minimum effective dosage and appears medically necessary. We will continue to monitor and reassess. This team will utilize appropriate emergency services if necessary. I will make myself available if concerns or problems arise.    Total time spent with the patient today was 26 minutes with greater than 50% of the time spent in counseling and care coordination. The patient agrees to call before then with any questions,  concerns or problems. We will assess for the appropriateness of possible psychotropic medication trials/changes. The patient will seek out appropriate emergency services should that become necessary.    Video Visit Details    Type of service: Video Visit    Video Start Time: 8:58 AM    Video End Time: 9:14 AM    Total time for video call: 16 minutes    Originating Location: Patient's home    Distant Location:  [facility]    Mode of Communication: Video call via goCatch      Total time for chart review, patient and staff interview, documentation and coordination of care is 26 minutes.    Patient Instructions   It was nice speaking with you today for our office video visit. The following is a summary of our visit.    General Information:    If lab work was done today as part of your evaluation you will generally be contacted via My Chart, mail, or phone with the results within 1-5 days. If there is an alarming result we will contact you by phone. Lab results come back at varying times, I generally wait until all labs are resulted before making comments on results. Please note labs are automatically released to My Chart once available.     If you need refills please contact your pharmacist. They will send a refill request to me to review. Please allow 3 business days for us to process all refill requests.     Please call or send a medical message through My Chart, with any questions or concerns.    If you need any paperwork completed please fax forms to [phone]. Please state if you would like a copy of the completed paperwork, mailed or faxed back to the patient and a fax number to fax the paperwork to. Please allow up to 10 business days for paperwork to be completed.    Ren Manuel MD        Again, thank you for allowing me to participate in the care of your patient.        Sincerely,        Ren Manuel MD

## 2022-07-14 DIAGNOSIS — R41.89 NEUROCOGNITIVE DEFICITS: ICD-10-CM

## 2022-07-14 DIAGNOSIS — R29.818 NEUROCOGNITIVE DEFICITS: ICD-10-CM

## 2022-07-14 RX ORDER — VENLAFAXINE HYDROCHLORIDE 150 MG/1
CAPSULE, EXTENDED RELEASE ORAL
Qty: 62 CAPSULE | Refills: 11 | Status: SHIPPED | OUTPATIENT
Start: 2022-07-14 | End: 2023-02-16

## 2022-07-14 NOTE — TELEPHONE ENCOUNTER
Refill request for Effexor XR. Pt last seen 4/26/22 and has follow up scheduled for 7/28/22. Will send in refills.     Anupama Herzog RN on 7/14/2022 at 11:39 AM

## 2022-08-02 ENCOUNTER — VIRTUAL VISIT (OUTPATIENT)
Dept: NEUROLOGY | Facility: CLINIC | Age: 32
End: 2022-08-02
Payer: MEDICARE

## 2022-08-02 DIAGNOSIS — R41.89 NEUROCOGNITIVE DEFICITS: Primary | ICD-10-CM

## 2022-08-02 DIAGNOSIS — R29.818 NEUROCOGNITIVE DEFICITS: Primary | ICD-10-CM

## 2022-08-02 PROCEDURE — 99213 OFFICE O/P EST LOW 20 MIN: CPT | Mod: 95 | Performed by: PSYCHIATRY & NEUROLOGY

## 2022-08-02 NOTE — PROGRESS NOTES
"Nicholas Daugherty is a 32 year old male who is being evaluated via a billable video visit in light of the ongoing global health crisis (COVID-19) that requires us to abide by social distancing mandates in order to reduce the risk of COVID-19 exposure.       The patient has been notified of following:     \"This video visit will be conducted via a video call between you and your physician/provider. We have found that certain health care needs can be provided without the need for a physical exam.  This service lets us provide the care you need with a short phone/video conversation.  If a prescription is necessary we can send it directly to your pharmacy.  If lab work is needed we can place an order for that and you can then stop by our lab to have the test done at a later time.    If during the course of the call the physician/provider feels a video visit is not appropriate, you will not be charged for this service.\"     Patient has given verbal consent to a video visit? Yes    Consent was obtained for this service by one of our care team members      Any new medication (other provider):   No   Meds started at last appointment:  No   Results: N/A  Meds increased/decreased at last appointment:    No   Results: N/A     Patient's impression of how medication is working? Working fine      Compliant with Medication? Yes       Side Effects: No    Pain (0-10) No   Appetite change No   Sleep disturbance No   Change in energy No   Change in interest No   Change in concentration No   Psychosis/Hallucinations No   Negative thoughts No   Mood swings Yes - Occasionally but better than last visit   Alcohol use No   Drug use No   Anxiety Yes   Sad/depressed mood  No     Questions or concerns?: No                                                          Phone Start Time: 10:45 AM    Phone End Time:  10:47 AM    Total time of phone conversation: 2 minutes    There were no vitals filed for this visit.    Patient would like the video " invitation sent by: [employer]   Number/e-mail address:   [phone]       Dany Alvarez ATC    Outpatient Follow up TBI Evaluation     Pertinent History:  The patient was referred to this clinic for further assessment and treatment . Patient first saw me January 2 of 2018.  The patient had been followed at that time by Dr. Nguyen in the primary care clinic.  The patient has a history of Werner's syndrome diagnosed at age 3.  He had been followed by pediatric neurology.  He been on multiple medications over the years.  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  This was to control seizures and it had a positive effect on that.  The patient has had motor dysfunction and required a tendon release procedure.  When I first saw the patient he was on a combination of Prozac, Keppra and trazodone but continued to struggle with depression.  At that visit we did taper off the Prozac and then started the patient on low-dose venlafaxine.       I also had the patient go for neuropsychological testing.  Please see that data in the chart.      In spring 2018 we did increase the patient's Effexor XL.  In May 2018 we increased the Effexor further to 225 mg a day.     I saw the patient in September 2018 and at that time we did not make any medication changes.     I saw the patient in November 2019.  At that time he was complaining of lower motivation and some difficulty with sleep and poor sleep hygiene.  There were some more situational stressors including the death of his girlfriends mother.  We did did increase the patient's Effexor to 300 mg a day.     I saw the patient in April 2019.  He was doing well at that time.  The increase in Effexor had been helpful.  We did not make any medication changes at that time.     I saw the patient in October 2019.  He had been furloughed from work and was looking for another job.  He was a bit more depressed and frustrated.  His grandmother was dying with dementia  which was difficult for him.  His brother was being deployed to [place].  We did increase his Effexor at that time to 375 mg a da I saw the patient in January 2020. The patient was doing well at that time we did not make any medication changes.     I interviewed the patient as well as a staff member by phone in September 2020.  The patient believes the meds were somewhat low and both he and the staff are requesting to continue with current treatment plan.  We did not make any changes at that visit.        I saw the patient in March 2021.Patient presents today for the purposes of medication management.  He was doing well at that time.  He had recently returned from a family trip to [place].  He was tolerating the medication.  We did not make any changes.    I saw the patient in August 2021.  He was doing well and he recently started a job at the [employer].  His social isolation was improving.  His mood was good.  He was tolerating the medication and we did not make any medication changes at that visit.    The patient was seen in April 2022 and was doing well at that time.  He had recently left his job at [id number] and was going to start being a .  We did not make any medication changes.    HPI:  Patient presents today for the purposes of medication management.   On my interview with patient today he states he is doing extremely well and does not want any medication changes.  He states his mood has been good with no depression.  No irritability or anger.  No tamiko.  He is sleeping well and eating well.  No change in cognition.  He reports he has been able to do things socially and is having fun but is been quite hot this summer which is curtailed some of his activities.    There is been no new medical issues or concerns.  No new tremors.  No side effects to the medication.  He reports he has been compliant.  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  He is requesting no changes at this time and he has no other questions.     We discussed some treatment options and have elected to continue with the current treatment plan.      Current Medications: Please see chart. Medications personally reviewed.     There are no problems to display for this patient.    No past medical history on file.  No past surgical history on file.  No family history on file.  Current Outpatient Medications   Medication Sig Dispense Refill     carbamide peroxide (DEBROX) 6.5 % otic solution [CARBAMIDE PEROXIDE (DEBROX) 6.5 % OTIC SOLUTION] Administer 10 drops into both ears as needed.       dextromethorphan-guaiFENesin (ROBITUSSIN-DM)  mg/5 mL liquid [DEXTROMETHORPHAN-GUAIFENESIN (ROBITUSSIN-DM)  MG/5 ML LIQUID] Take 10 mL by mouth every 12 (twelve) hours.       docusate sodium (COLACE) 100 MG capsule [DOCUSATE SODIUM (COLACE) 100 MG CAPSULE] Take 100 mg by mouth 2 (two) times a day as needed for constipation.       hydrocortisone 1 % cream [HYDROCORTISONE 1 % CREAM] Apply topically 3 (three) times a day.       ibuprofen (ADVIL,MOTRIN) 200 MG tablet [IBUPROFEN (ADVIL,MOTRIN) 200 MG TABLET] Take 400 mg by mouth every 6 (six) hours as needed for pain.       levETIRAcetam (KEPPRA) 750 MG tablet [LEVETIRACETAM (KEPPRA) 750 MG TABLET] Take 3,000 mg by mouth daily.       loperamide (IMODIUM) 2 mg capsule [LOPERAMIDE (IMODIUM) 2 MG CAPSULE] Take 2 mg by mouth 4 (four) times a day as needed for diarrhea.       psyllium with dextrose (METAMUCIL JAR) powder [PSYLLIUM WITH DEXTROSE (METAMUCIL JAR) POWDER] Take by mouth 3 (three) times a day as needed.       traZODone (DESYREL) 50 MG tablet [TRAZODONE (DESYREL) 50 MG TABLET] Take 50 mg by mouth at bedtime.       venlafaxine (EFFEXOR XR) 150 MG 24 hr capsule TAKE 2 CAPSULES (300MG) BY MOUTH ONCE DAILY 62 capsule 11     venlafaxine  (EFFEXOR-XR) 75 MG 24 hr capsule TAKE 1 CAPSULE BY MOUTH ONCE DAILY (ALONG WITH 150MG CAPSULES FOR A TOTAL DOSE OF 375MG) 31 capsule 11       Allergies   Allergen Reactions     Tegretol [Carbamazepine] Unknown     Social History     Socioeconomic History     Marital status: Single     Spouse name: Not on file     Number of children: Not on file     Years of education: Not on file     Highest education level: Not on file   Occupational History     Not on file   Tobacco Use     Smoking status: Never Smoker     Smokeless tobacco: Never Used   Substance and Sexual Activity     Alcohol use: Not on file     Drug use: Not on file     Sexual activity: Not on file   Other Topics Concern     Not on file   Social History Narrative     Not on file     Social Determinants of Health     Financial Resource Strain: Not on file   Food Insecurity: Not on file   Transportation Needs: Not on file   Physical Activity: Not on file   Stress: Not on file   Social Connections: Not on file   Intimate Partner Violence: Not on file   Housing Stability: Not on file       The following portions of the patient's history were reviewed and updated as appropriate: allergies, current medications, past family history, past medical history, past social history, past surgical history and problem list.    Review of Systems  A comprehensive review of systems was negative except for what is noted above    Mental Status Exam  Alertness: Calm and alert.  Appearance: Comfortable and calm.  Patient is able to initiate and participate.  Behavior/Demeanor: He denies having any recent behavioral difficulties or concerns.  With me he is calm and comfortable and participating well.  Speech: Sentence structure was intact.  Not pressured or rambling.  Answers were consistent and appropriate.  He is able to initiate.  Psychomotor: No slowing.  No agitation or irritability.  Mood: Bright with no evidence of any changes or difficulties.  No depression.  No tamiko.  No  irritability.  Affect: Bright with no lability or irritability  Thought Process/Associations: Tracking adequately.  Participating well.  Processing information adequately.  Thought Content: No hallucinations or delusions  Perception: Grossly intact  Insight: No change.  Grossly intact  Judgment: Grossly intact  Attention/Concentration: Attentive and tracking well.  Follow the conversation well.  Able to initiate.  Language: Grossly intact  Fund of Knowledge: Grossly intact  Memory: He reports no changes.  No obvious changes.       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     Plan:  Medication Adjustment:  I will make no changes at this time.    Other:   Patient will return to clinic in  3 to 4 months. They agree to call or return sooner with any questions or concerns.  Risks and benefits were discussed.      Continue with the support of the clinic, reassurance, and redirection. Staff monitoring and ongoing assessments per team plan. Current psychotropic medication appears to represent the minimum effective dosage and appears medically necessary. We will continue to monitor and reassess. This team will utilize appropriate emergency services if necessary. I will make myself available if concerns or problems arise.    Total time spent with the patient today was 25 minutes with greater than 50% of the time spent in counseling and care coordination. The patient agrees to call before then with any questions, concerns or problems. We will assess for the appropriateness of possible psychotropic medication trials/changes. The patient will seek out appropriate emergency services should that become necessary.    Video Visit Details    Type of service: Video Visit    Video Start Time: 11 AM    Video End Time: 11:14 AM    Total time for video call: 14 minutes    Originating Location: Patient's home    Distant Location:    [facility]    Mode of Communication: Video call via [employer]      Total time for chart review, patient and staff interview, documentation and coordination of care is 25 minutes.    Patient Instructions   It was nice speaking with you today for our office video visit. The following is a summary of our visit.    General Information:    If lab work was done today as part of your evaluation you will generally be contacted via My Chart, mail, or phone with the results within 1-5 days. If there is an alarming result we will contact you by phone. Lab results come back at varying times, I generally wait until all labs are resulted before making comments on results. Please note labs are automatically released to My Chart once available.     If you need refills please contact your pharmacist. They will send a refill request to me to review. Please allow 3 business days for us to process all refill requests.     Please call or send a medical message through My Chart, with any questions or concerns.    If you need any paperwork completed please fax forms to [phone]. Please state if you would like a copy of the completed paperwork, mailed or faxed back to the patient and a fax number to fax the paperwork to. Please allow up to 10 business days for paperwork to be completed.    Ren Manuel MD

## 2022-08-02 NOTE — LETTER
"    8/2/2022         RE: Nicholas Daugherty  [address]  [address]  \[facility] [place] [facility]\"" [id number]        Dear Colleague,    Thank you for referring your patient, Nicholas Daugherty, to the [facility]. Please see a copy of my visit note below.    Nicholas Daugherty is a 32 year old male who is being evaluated via a billable video visit in light of the ongoing global health crisis (COVID-19) that requires us to abide by social distancing mandates in order to reduce the risk of COVID-19 exposure.       The patient has been notified of following:     \"This video visit will be conducted via a video call between you and your physician/provider. We have found that certain health care needs can be provided without the need for a physical exam.  This service lets us provide the care you need with a short phone/video conversation.  If a prescription is necessary we can send it directly to your pharmacy.  If lab work is needed we can place an order for that and you can then stop by our lab to have the test done at a later time.    If during the course of the call the physician/provider feels a video visit is not appropriate, you will not be charged for this service.\"     Patient has given verbal consent to a video visit? Yes    Consent was obtained for this service by one of our care team members      Any new medication (other provider):   No   Meds started at last appointment:  No   Results: N/A  Meds increased/decreased at last appointment:    No   Results: N/A     Patient's impression of how medication is working? Working fine      Compliant with Medication? Yes       Side Effects: No    Pain (0-10) No   Appetite change No   Sleep disturbance No   Change in energy No   Change in interest No   Change in concentration No   Psychosis/Hallucinations No   Negative thoughts No   Mood swings Yes - Occasionally but better than last visit   Alcohol use No   Drug use No   Anxiety Yes   Sad/depressed mood  No     Questions or " concerns?: No                                                          Phone Start Time: 10:45 AM    Phone End Time:  10:47 AM    Total time of phone conversation: 2 minutes    There were no vitals filed for this visit.    Patient would like the video invitation sent by: [place]   Number/e-mail address:   [phone]       Dany Alvarez ATC    Outpatient Follow up TBI Evaluation     Pertinent History:  The patient was referred to this clinic for further assessment and treatment . Patient first saw me January 2 of 2018.  The patient had been followed at that time by Dr. Nguyen in the primary care clinic.  The patient has a history of Werner's syndrome diagnosed at age 3.  He had been followed by pediatric neurology.  He been on multiple medications over the years.  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  This was to control seizures and it had a positive effect on that.  The patient has had motor dysfunction and required a tendon release procedure.  When I first saw the patient he was on a combination of Prozac, Keppra and trazodone but continued to struggle with depression.  At that visit we did taper off the Prozac and then started the patient on low-dose venlafaxine.       I also had the patient go for neuropsychological testing.  Please see that data in the chart.      In spring 2018 we did increase the patient's Effexor XL.  In May 2018 we increased the Effexor further to 225 mg a day.     I saw the patient in September 2018 and at that time we did not make any medication changes.     I saw the patient in November 2019.  At that time he was complaining of lower motivation and some difficulty with sleep and poor sleep hygiene.  There were some more situational stressors including the death of his girlfriends mother.  We did did increase the patient's Effexor to 300 mg a day.     I saw the patient in April 2019.  He was doing well at that time.  The increase in Effexor had been  helpful.  We did not make any medication changes at that time.     I saw the patient in October 2019.  He had been furloughed from work and was looking for another job.  He was a bit more depressed and frustrated.  His grandmother was dying with dementia which was difficult for him.  His brother was being deployed to [place].  We did increase his Effexor at that time to 375 mg a da I saw the patient in January 2020. The patient was doing well at that time we did not make any medication changes.     I interviewed the patient as well as a staff member by phone in September 2020.  The patient believes the meds were somewhat low and both he and the staff are requesting to continue with current treatment plan.  We did not make any changes at that visit.        I saw the patient in March 2021.Patient presents today for the purposes of medication management.  He was doing well at that time.  He had recently returned from a family trip to [place].  He was tolerating the medication.  We did not make any changes.    I saw the patient in August 2021.  He was doing well and he recently started a job at the [employer].  His social isolation was improving.  His mood was good.  He was tolerating the medication and we did not make any medication changes at that visit.    The patient was seen in April 2022 and was doing well at that time.  He had recently left his job at [employer] and was going to start being a .  We did not make any medication changes.    HPI:  Patient presents today for the purposes of medication management.   On my interview with patient today he states he is doing extremely well and does not want any medication changes.  He states his mood has been good with no depression.  No irritability or anger.  No tamiko.  He is sleeping well and eating well.  No change in cognition.  He reports he has been able to do things socially and is having fun but is been quite hot this summer which is curtailed some  of his activities.    There is been no new medical issues or concerns.  No new tremors.  No side effects to the medication.  He reports he has been compliant.  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  He is requesting no changes at this time and he has no other questions.     We discussed some treatment options and have elected to continue with the current treatment plan.      Current Medications: Please see chart. Medications personally reviewed.     There are no problems to display for this patient.    No past medical history on file.  No past surgical history on file.  No family history on file.  Current Outpatient Medications   Medication Sig Dispense Refill     carbamide peroxide (DEBROX) 6.5 % otic solution [CARBAMIDE PEROXIDE (DEBROX) 6.5 % OTIC SOLUTION] Administer 10 drops into both ears as needed.       dextromethorphan-guaiFENesin (ROBITUSSIN-DM)  mg/5 mL liquid [DEXTROMETHORPHAN-GUAIFENESIN (ROBITUSSIN-DM)  MG/5 ML LIQUID] Take 10 mL by mouth every 12 (twelve) hours.       docusate sodium (COLACE) 100 MG capsule [DOCUSATE SODIUM (COLACE) 100 MG CAPSULE] Take 100 mg by mouth 2 (two) times a day as needed for constipation.       hydrocortisone 1 % cream [HYDROCORTISONE 1 % CREAM] Apply topically 3 (three) times a day.       ibuprofen (ADVIL,MOTRIN) 200 MG tablet [IBUPROFEN (ADVIL,MOTRIN) 200 MG TABLET] Take 400 mg by mouth every 6 (six) hours as needed for pain.       levETIRAcetam (KEPPRA) 750 MG tablet [LEVETIRACETAM (KEPPRA) 750 MG TABLET] Take 3,000 mg by mouth daily.       loperamide (IMODIUM) 2 mg capsule [LOPERAMIDE (IMODIUM) 2 MG CAPSULE] Take 2 mg by mouth 4 (four) times a day as needed for diarrhea.       psyllium with dextrose (METAMUCIL JAR) powder [PSYLLIUM WITH DEXTROSE (METAMUCIL JAR) POWDER] Take by mouth 3 (three)  times a day as needed.       traZODone (DESYREL) 50 MG tablet [TRAZODONE (DESYREL) 50 MG TABLET] Take 50 mg by mouth at bedtime.       venlafaxine (EFFEXOR XR) 150 MG 24 hr capsule TAKE 2 CAPSULES (300MG) BY MOUTH ONCE DAILY 62 capsule 11     venlafaxine (EFFEXOR-XR) 75 MG 24 hr capsule TAKE 1 CAPSULE BY MOUTH ONCE DAILY (ALONG WITH 150MG CAPSULES FOR A TOTAL DOSE OF 375MG) 31 capsule 11       Allergies   Allergen Reactions     Tegretol [Carbamazepine] Unknown     Social History     Socioeconomic History     Marital status: Single     Spouse name: Not on file     Number of children: Not on file     Years of education: Not on file     Highest education level: Not on file   Occupational History     Not on file   Tobacco Use     Smoking status: Never Smoker     Smokeless tobacco: Never Used   Substance and Sexual Activity     Alcohol use: Not on file     Drug use: Not on file     Sexual activity: Not on file   Other Topics Concern     Not on file   Social History Narrative     Not on file     Social Determinants of Health     Financial Resource Strain: Not on file   Food Insecurity: Not on file   Transportation Needs: Not on file   Physical Activity: Not on file   Stress: Not on file   Social Connections: Not on file   Intimate Partner Violence: Not on file   Housing Stability: Not on file       The following portions of the patient's history were reviewed and updated as appropriate: allergies, current medications, past family history, past medical history, past social history, past surgical history and problem list.    Review of Systems  A comprehensive review of systems was negative except for what is noted above    Mental Status Exam  Alertness: Calm and alert.  Appearance: Comfortable and calm.  Patient is able to initiate and participate.  Behavior/Demeanor: He denies having any recent behavioral difficulties or concerns.  With me he is calm and comfortable and participating well.  Speech: Sentence structure was  intact.  Not pressured or rambling.  Answers were consistent and appropriate.  He is able to initiate.  Psychomotor: No slowing.  No agitation or irritability.  Mood: Bright with no evidence of any changes or difficulties.  No depression.  No tamiko.  No irritability.  Affect: Bright with no lability or irritability  Thought Process/Associations: Tracking adequately.  Participating well.  Processing information adequately.  Thought Content: No hallucinations or delusions  Perception: Grossly intact  Insight: No change.  Grossly intact  Judgment: Grossly intact  Attention/Concentration: Attentive and tracking well.  Follow the conversation well.  Able to initiate.  Language: Grossly intact  Fund of Knowledge: Grossly intact  Memory: He reports no changes.  No obvious changes.       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     Plan:  Medication Adjustment:  I will make no changes at this time.    Other:   Patient will return to clinic in  3 to 4 months. They agree to call or return sooner with any questions or concerns.  Risks and benefits were discussed.      Continue with the support of the clinic, reassurance, and redirection. Staff monitoring and ongoing assessments per team plan. Current psychotropic medication appears to represent the minimum effective dosage and appears medically necessary. We will continue to monitor and reassess. This team will utilize appropriate emergency services if necessary. I will make myself available if concerns or problems arise.    Total time spent with the patient today was 25 minutes with greater than 50% of the time spent in counseling and care coordination. The patient agrees to call before then with any questions, concerns or problems. We will assess for the appropriateness of possible psychotropic medication trials/changes. The patient will seek out appropriate emergency  services should that become necessary.    Video Visit Details    Type of service: Video Visit    Video Start Time: 11 AM    Video End Time: 11:14 AM    Total time for video call: 14 minutes    Originating Location: Patient's home    Distant Location:  [facility]    Mode of Communication: Video call via [employer]      Total time for chart review, patient and staff interview, documentation and coordination of care is 25 minutes.    Patient Instructions   It was nice speaking with you today for our office video visit. The following is a summary of our visit.    General Information:    If lab work was done today as part of your evaluation you will generally be contacted via My Chart, mail, or phone with the results within 1-5 days. If there is an alarming result we will contact you by phone. Lab results come back at varying times, I generally wait until all labs are resulted before making comments on results. Please note labs are automatically released to My Chart once available.     If you need refills please contact your pharmacist. They will send a refill request to me to review. Please allow 3 business days for us to process all refill requests.     Please call or send a medical message through My Chart, with any questions or concerns.    If you need any paperwork completed please fax forms to [phone]. Please state if you would like a copy of the completed paperwork, mailed or faxed back to the patient and a fax number to fax the paperwork to. Please allow up to 10 business days for paperwork to be completed.    Ren Manuel MD        Again, thank you for allowing me to participate in the care of your patient.        Sincerely,        Ren Manuel MD

## 2022-09-25 ENCOUNTER — HEALTH MAINTENANCE LETTER (OUTPATIENT)
Age: 32
End: 2022-09-25

## 2022-10-29 NOTE — PROGRESS NOTES
Occupational Therapy  OT General Progress Note    Medicare Insurance    Units: 5 ot ex  Total Minutes: 75  Treatment #: 8/10    ASSESSMENT  Summary & Analysis of treatment: Pt seen this date with group home ju[place] assisted pt with bringing in his bike in from home to try to come up with solution  for l hand placement with riding bike. Pt and  pt wearing splint but difficult getting a second time in the day. Pt continues to have decrease elbow hand tone. Pt has been carrying out own stretches. Pt and  looking into Dr order for orthotics to issue elbow extention splint    See initial evaluation for goals and POC. Progress toward goals: Improved    PLAN  Treatment time: Therapeutic Exercise 75 minutes    Plan: 2.Patient progressing towards goals.   Plan: 1.Continue per plan of care.  1. Patient will demonstrate improved function with LUE by using it to actively assist with ADLs.- New goal 3/29/18  2. Patient will demonstrate improved performance with computer use by using his LUE to actively assist his RUE.- New goal 3/29/18  3. Patient will demonstrate independence with an exercise program to improve LUE comfort and mobility during I/ADLs. - New goal 3/29/18  4.Patient will tolerate a LUE resting hand splint and adaptive equipment PRN to improve performance with I/ADLs. - New goal 3/29/18  Recommendations: adaptive equipment, LUE home ex program.adapation of  arm bike for home use pt will bring in, and adaptations for placement of l hand with biking   Pt to increase wearing of splint for therapist to increase thumb and finger postion in splint. Pt to allow staff to assist with daily shoulder elbow stretches. Pt to use l arm as active assist with adls    SUBJECTIVE  Pain: No      OBJECTIVE Pt seen on mat with side lying stretches and supine with air splint . Pt able to get better stretch with assistance. Staff will assist pt one x a day and pt will continue other  Please review. Protocol failed. No future appointments.     Requested Prescriptions   Pending Prescriptions Disp Refills    OLMESARTAN MEDOXOMIL-HCTZ 40-25 MG Oral Tab [Pharmacy Med Name: Manda Thorpe HCT TAB 40-25MG] 90 tablet 0     Sig: TAKE 1 TABLET DAILY       Hypertensive Medications Protocol Failed - 10/27/2022  2:19 PM        Failed - Last BP reading less than 140/90     BP Readings from Last 1 Encounters:  08/05/22 : (!) 194/133              Passed - In person appointment in the past 12 or next 3 months     Recent Outpatient Visits              2 months ago Encounter for screening and preventative care    [address] , [facility], DO    Office Visit    1 year ago Plantar fascial fibromatosis of left foot    [facility], [address], [place], Marlise Bloch, DPM    Office Visit    1 year ago Routine physical examination    [address], [facility], DO    Office Visit    2 years ago Morbid obesity with BMI of 40.0-44.9, adult [facility])    [address] [facility] [address], [facility], DO    Office Visit                      Passed - CMP or BMP in past 6 months     Recent Results (from the past 4392 hour(s))   COMP METABOLIC PANEL (14)    Collection Time: 08/05/22  2:06 PM   Result Value Ref Range    Glucose 72 70 - 99 mg/dL    Sodium 143 136 - 145 mmol/L    Potassium 3.3 (L) 3.5 - 5.1 mmol/L    Chloride 106 98 - 112 mmol/L    CO2 29.0 21.0 - 32.0 mmol/L    Anion Gap 8 0 - 18 mmol/L    BUN 14 7 - 18 mg/dL    Creatinine 1.26 0.70 - 1.30 mg/dL    BUN/CREA Ratio 11.1 10.0 - 20.0    Calcium, Total 8.7 8.5 - 10.1 mg/dL    Calculated Osmolality 295 275 - 295 mOsm/kg    eGFR-Cr 66 >=60 mL/min/1.73m2    ALT 35 16 - 61 U/L    AST 28 15 - 37 U/L    Alkaline Phosphatase 103 45 - 117 U/L    Bilirubin, Total 0.4 0.1 - 2.0 mg/dL    Total Protein 8.3 (H) 6.4 - 8.2 g/dL    Albumin 3.2 (L) 3.4 - 5.0 g/dL    Globulin  5.1 (H) 2.8 - 4.4 g/dL    A/G Ratio 0.6 (L) 1.0 - 2.0    Patient Fasting for CMP? No      *Note: Due to a large number of results and/or encounters for the requested time period, some results have not been displayed. A complete set of results can be found in Results Review.                Passed - In person appointment or virtual visit in the past 6 months     Recent Outpatient Visits              2 months ago Encounter for screening and preventative care    [address], [facility], DO    Office Visit    1 year ago Plantar fascial fibromatosis of left foot    [facility], [address], Sushila Myers DPM    Office Visit    1 year ago Routine physical examination    [address], [facility], DO    Office Visit    2 years ago Morbid obesity with BMI of 40.0-44.9, adult [facility])    [address], [facility], DO    Office Visit                      Passed - EGFRCR or GFRAA > 50     GFR Evaluation  EGFRCR: 66 , resulted on 8/5/2022               Recent Outpatient Visits              2 months ago Encounter for screening and preventative care    [address], [facility], DO    Office Visit    1 year ago Plantar fascial fibromatosis of left foot    [facility], [address], Sushila Myers DPM    Office Visit    1 year ago Routine physical examination    [address], [facility], DO    Office Visit    2 years ago Morbid obesity with BMI of 40.0-44.9, adult [facility])    [address], [place], [place]    Office Visit stretches on bed couch 2-3x a day. Decrease flexion tone in arm and hand with therapist planning to adjust resting hand splint next time, Pt able to participate in arm stretches on table and use arm bike with both arms for more scapula stretch and l arm alone for strengthening. Noted increase shoulder and elbow flex/ext with decrease tone. Therapist gave information for u/e bike for home and group home looking into purchasing and bring into next tx for adaptations, therapist and  and pt discussed bike adaptation of stationary handle to increase balance with biking then having arm up at chest. Group home getting Dr order and planning to go to orthotics to be fitted with l elbow ext splint    Nicole Delgadillo, OT

## 2022-11-03 ENCOUNTER — VIRTUAL VISIT (OUTPATIENT)
Dept: NEUROLOGY | Facility: CLINIC | Age: 32
End: 2022-11-03
Payer: MEDICARE

## 2022-11-03 ENCOUNTER — TELEPHONE (OUTPATIENT)
Dept: NEUROLOGY | Facility: CLINIC | Age: 32
End: 2022-11-03

## 2022-11-03 DIAGNOSIS — R41.89 NEUROCOGNITIVE DEFICITS: Primary | ICD-10-CM

## 2022-11-03 DIAGNOSIS — R29.818 NEUROCOGNITIVE DEFICITS: Primary | ICD-10-CM

## 2022-11-03 PROCEDURE — 99213 OFFICE O/P EST LOW 20 MIN: CPT | Mod: 95 | Performed by: PSYCHIATRY & NEUROLOGY

## 2022-11-03 NOTE — LETTER
"    11/3/2022         RE: Nicholas Daugherty  [address]  [address]  [facility] [address]        Dear Colleague,    Thank you for referring your patient, Nicholas Daugherty, to the [facility]. Please see a copy of my visit note below.    Nicholas is a 32 year old who is being evaluated via a billable telephone visit.      What phone number would you like to be contacted at? [phone]  How would you like to obtain your AVS? MyChart    Nicholas Daugherty is a 32 year old male who is being evaluated via a billable video visit in light of the ongoing global health crisis (COVID-19) that requires us to abide by social distancing mandates in order to reduce the risk of COVID-19 exposure.       The patient has been notified of following:     \"This video visit will be conducted via a video call between you and your physician/provider. We have found that certain health care needs can be provided without the need for a physical exam.  This service lets us provide the care you need with a short phone/video conversation.  If a prescription is necessary we can send it directly to your pharmacy.  If lab work is needed we can place an order for that and you can then stop by our lab to have the test done at a later time.    If during the course of the call the physician/provider feels a video visit is not appropriate, you will not be charged for this service.\"     Patient has given verbal consent to a video visit? Yes    Consent was obtained for this service by one of our care team members      Any new medication (other provider):   No   Meds started at last appointment:  No   Results: N/A  Meds increased/decreased at last appointment:    No   Results: N/A     Patient's impression of how medication is working? Working fine      Compliant with Medication? Yes       Side Effects: No    Pain (0-10) No   Appetite change No   Sleep disturbance No   Change in energy No   Change in interest No   Change in concentration No "   Psychosis/Hallucinations No   Negative thoughts No   Mood swings Yes - Occasionally but better than last visit   Alcohol use No   Drug use No   Anxiety Yes   Sad/depressed mood  No     Questions or concerns?: No                                                          Phone Start Time: 10:45 AM    Phone End Time:  10:47 AM    Total time of phone conversation: 2 minutes    There were no vitals filed for this visit.    Patient would like the video invitation sent by: [employer]   Number/e-mail address:   [phone]       Anisha Clemente    Outpatient Follow up TBI Evaluation     Pertinent History:  The patient was referred to this clinic for further assessment and treatment . Patient first saw me January 2 of 2018.  The patient had been followed at that time by Dr. Nguyen in the primary care clinic.  The patient has a history of Werner's syndrome diagnosed at age 3.  He had been followed by pediatric neurology.  He been on multiple medications over the years.  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  This was to control seizures and it had a positive effect on that.  The patient has had motor dysfunction and required a tendon release procedure.  When I first saw the patient he was on a combination of Prozac, Keppra and trazodone but continued to struggle with depression.  At that visit we did taper off the Prozac and then started the patient on low-dose venlafaxine.       I also had the patient go for neuropsychological testing.  Please see that data in the chart.      In spring 2018 we did increase the patient's Effexor XL.  In May 2018 we increased the Effexor further to 225 mg a day.     I saw the patient in September 2018 and at that time we did not make any medication changes.     I saw the patient in November 2019.  At that time he was complaining of lower motivation and some difficulty with sleep and poor sleep hygiene.  There were some more situational stressors including the death of  his girlfriends mother.  We did did increase the patient's Effexor to 300 mg a day.     I saw the patient in April 2019.  He was doing well at that time.  The increase in Effexor had been helpful.  We did not make any medication changes at that time.     I saw the patient in October 2019.  He had been furloughed from work and was looking for another job.  He was a bit more depressed and frustrated.  His grandmother was dying with dementia which was difficult for him.  His brother was being deployed to [place].  We did increase his Effexor at that time to 375 mg a da I saw the patient in January 2020. The patient was doing well at that time we did not make any medication changes.     I interviewed the patient as well as a staff member by phone in September 2020.  The patient believes the meds were somewhat low and both he and the staff are requesting to continue with current treatment plan.  We did not make any changes at that visit.        I saw the patient in March 2021.Patient presents today for the purposes of medication management.  He was doing well at that time.  He had recently returned from a family trip to [place].  He was tolerating the medication.  We did not make any changes.    I saw the patient in August 2021.  He was doing well and he recently started a job at the [employer].  His social isolation was improving.  His mood was good.  He was tolerating the medication and we did not make any medication changes at that visit.    The patient was seen in April 2022 and was doing well at that time.  He had recently left his job at [employer] and was going to start being a .  We did not make any medication changes.    The patient was seen for a follow-up on August 2, 2022.  He was doing extremely well at that time and tolerating the medication.  We did not make any medication changes.    HPI:  Patient presents today for the purposes of medication management.   I had an opportunity to interview  the patient today as well as his staff member Daphne and both reported things were going extremely well.  The patient did not have any current concerns or complaints.  He denied having any depression.  No feelings of hopelessness or worthlessness or guilt.  No thoughts of hurting himself or anybody else.  No hallucinations or delusions.  He told me he had a few nights of not sleeping well but then last night slept extremely well.  He denied having any psychotic symptoms.  No tamiko.  Appetite is unchanged.  Concentration and focus are unchanged.  He has been enjoying going to Jainism and Jainism support groups.  He reports he feels like things are going well socially for him.  He has no new medical issues or concerns.  No side effects to the medication.  He does not feel he needs any medication changes at this time.     We discussed some treatment options and have elected to continue with the current treatment plan.      Current Medications: Please see chart. Medications personally reviewed.     There are no problems to display for this patient.    No past medical history on file.  No past surgical history on file.  No family history on file.  Current Outpatient Medications   Medication Sig Dispense Refill     docusate sodium (COLACE) 100 MG capsule [DOCUSATE SODIUM (COLACE) 100 MG CAPSULE] Take 100 mg by mouth 2 (two) times a day as needed for constipation.       ibuprofen (ADVIL,MOTRIN) 200 MG tablet [IBUPROFEN (ADVIL,MOTRIN) 200 MG TABLET] Take 400 mg by mouth every 6 (six) hours as needed for pain.       levETIRAcetam (KEPPRA) 750 MG tablet [LEVETIRACETAM (KEPPRA) 750 MG TABLET] Take 3,000 mg by mouth daily.       psyllium with dextrose (METAMUCIL JAR) powder [PSYLLIUM WITH DEXTROSE (METAMUCIL JAR) POWDER] Take by mouth 3 (three) times a day as needed.       traZODone (DESYREL) 50 MG tablet [TRAZODONE (DESYREL) 50 MG TABLET] Take 50 mg by mouth at bedtime.       venlafaxine (EFFEXOR XR) 150 MG 24 hr capsule TAKE 2  CAPSULES (300MG) BY MOUTH ONCE DAILY 62 capsule 11     venlafaxine (EFFEXOR-XR) 75 MG 24 hr capsule TAKE 1 CAPSULE BY MOUTH ONCE DAILY (ALONG WITH 150MG CAPSULES FOR A TOTAL DOSE OF 375MG) 31 capsule 11     carbamide peroxide (DEBROX) 6.5 % otic solution [CARBAMIDE PEROXIDE (DEBROX) 6.5 % OTIC SOLUTION] Administer 10 drops into both ears as needed. (Patient not taking: Reported on 11/3/2022)       dextromethorphan-guaiFENesin (ROBITUSSIN-DM)  mg/5 mL liquid [DEXTROMETHORPHAN-GUAIFENESIN (ROBITUSSIN-DM)  MG/5 ML LIQUID] Take 10 mL by mouth every 12 (twelve) hours. (Patient not taking: Reported on 11/3/2022)       hydrocortisone 1 % cream [HYDROCORTISONE 1 % CREAM] Apply topically 3 (three) times a day. (Patient not taking: Reported on 11/3/2022)       loperamide (IMODIUM) 2 mg capsule [LOPERAMIDE (IMODIUM) 2 MG CAPSULE] Take 2 mg by mouth 4 (four) times a day as needed for diarrhea. (Patient not taking: Reported on 11/3/2022)         Allergies   Allergen Reactions     Tegretol [Carbamazepine] Unknown     Social History     Socioeconomic History     Marital status: Single     Spouse name: Not on file     Number of children: Not on file     Years of education: Not on file     Highest education level: Not on file   Occupational History     Not on file   Tobacco Use     Smoking status: Never     Smokeless tobacco: Never   Substance and Sexual Activity     Alcohol use: Not on file     Drug use: Not on file     Sexual activity: Not on file   Other Topics Concern     Not on file   Social History Narrative     Not on file     Social Determinants of Health     Financial Resource Strain: Not on file   Food Insecurity: Not on file   Transportation Needs: Not on file   Physical Activity: Not on file   Stress: Not on file   Social Connections: Not on file   Intimate Partner Violence: Not on file   Housing Stability: Not on file       The following portions of the patient's history were reviewed and updated as  appropriate: allergies, current medications, past family history, past medical history, past social history, past surgical history and problem list.    Review of Systems  A comprehensive review of systems was negative except for what is noted above    Mental Status Exam  Alertness: Calm and alert.  Participating well.  No reports of any sedation.  Appearance: Pleasant and cooperative throughout the interview.  Comfortable and calm.  Patient is able to initiate and participate.  Behavior/Demeanor: The staff report no behavioral changes or difficulties.  He has been pleasant and calm.  There is been no agitation.  With me he was pleasant and cooperative and appreciative.  Speech: Sentence structure was intact.  Not pressured or rambling.  Answers were consistent and appropriate.  He is able to initiate.  Answers were consistent and appropriate.  Psychomotor: No slowing.  No agitation or irritability.  Staff report no changes.  Mood: Bright with no evidence of any changes or difficulties.  No depression.  No tamiko.  No irritability.  He shows a good sense of humor and participates well.  Affect: Bright with no lability or irritability  Thought Process/Associations: Tracking adequately.  Participating well.  Processing information adequately.  Thought Content: No hallucinations or delusions  Perception: Grossly intact  Insight: No change.  Grossly intact  Judgment: Grossly intact  Attention/Concentration: Attentive and tracking well.  Follow the conversation well.  Able to initiate.  Language: Grossly intact  Fund of Knowledge: Grossly intact  Memory: He reports no changes.  No obvious changes.       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     Plan:  Medication Adjustment:  I will make no changes at this time.    Other:   Patient will return to clinic in  3 to 4 months. They agree to call or return sooner with  any questions or concerns.  Risks and benefits were discussed.      Continue with the support of the clinic, reassurance, and redirection. Staff monitoring and ongoing assessments per team plan. Current psychotropic medication appears to represent the minimum effective dosage and appears medically necessary. We will continue to monitor and reassess. This team will utilize appropriate emergency services if necessary. I will make myself available if concerns or problems arise.    Total time spent with the patient today was 26 minutes with greater than 50% of the time spent in counseling and care coordination. The patient agrees to call before then with any questions, concerns or problems. We will assess for the appropriateness of possible psychotropic medication trials/changes. The patient will seek out appropriate emergency services should that become necessary.    Video Visit Details    Type of service: Video Visit    Video Start Time: 10:30 AM    Video End Time: 10:43 AM    Total time for video call: 13 minutes    Originating Location: Patient's home    Distant Location:  [facility]    Mode of Communication: Call via [employer]      Total time for chart review, patient and staff interview, documentation and coordination of care is 25 minutes.    Patient Instructions   It was nice speaking with you today for our office video visit. The following is a summary of our visit.    General Information:    If lab work was done today as part of your evaluation you will generally be contacted via My Chart, mail, or phone with the results within 1-5 days. If there is an alarming result we will contact you by phone. Lab results come back at varying times, I generally wait until all labs are resulted before making comments on results. Please note labs are automatically released to My Chart once available.     If you need refills please contact your pharmacist. They will send a refill request to  me to review. Please allow 3 business days for us to process all refill requests.     Please call or send a medical message through My Chart, with any questions or concerns.    If you need any paperwork completed please fax forms to [phone]. Please state if you would like a copy of the completed paperwork, mailed or faxed back to the patient and a fax number to fax the paperwork to. Please allow up to 10 business days for paperwork to be completed.    Ren Manuel MD        Again, thank you for allowing me to participate in the care of your patient.        Sincerely,        Ren Manuel MD

## 2022-11-03 NOTE — PROGRESS NOTES
"Nicholas is a 32 year old who is being evaluated via a billable telephone visit.      What phone number would you like to be contacted at? [phone]  How would you like to obtain your AVS? Eugenio Daugherty is a 32 year old male who is being evaluated via a billable video visit in light of the ongoing global health crisis (COVID-19) that requires us to abide by social distancing mandates in order to reduce the risk of COVID-19 exposure.       The patient has been notified of following:     \"This video visit will be conducted via a video call between you and your physician/provider. We have found that certain health care needs can be provided without the need for a physical exam.  This service lets us provide the care you need with a short phone/video conversation.  If a prescription is necessary we can send it directly to your pharmacy.  If lab work is needed we can place an order for that and you can then stop by our lab to have the test done at a later time.    If during the course of the call the physician/provider feels a video visit is not appropriate, you will not be charged for this service.\"     Patient has given verbal consent to a video visit? Yes    Consent was obtained for this service by one of our care team members      Any new medication (other provider):   No   Meds started at last appointment:  No   Results: N/A  Meds increased/decreased at last appointment:    No   Results: N/A     Patient's impression of how medication is working? Working fine      Compliant with Medication? Yes       Side Effects: No    Pain (0-10) No   Appetite change No   Sleep disturbance No   Change in energy No   Change in interest No   Change in concentration No   Psychosis/Hallucinations No   Negative thoughts No   Mood swings Yes - Occasionally but better than last visit   Alcohol use No   Drug use No   Anxiety Yes   Sad/depressed mood  No     Questions or concerns?: No                                                "           Phone Start Time: 10:45 AM    Phone End Time:  10:47 AM    Total time of phone conversation: 2 minutes    There were no vitals filed for this visit.    Patient would like the video invitation sent by: [employer]   Number/e-mail address:   [phone]       Anisha Clemente    Outpatient Follow up TBI Evaluation     Pertinent History:  The patient was referred to this clinic for further assessment and treatment . Patient first saw me January 2 of 2018.  The patient had been followed at that time by Dr. Nguyen in the primary care clinic.  The patient has a history of Werner's syndrome diagnosed at age 3.  He had been followed by pediatric neurology.  He been on multiple medications over the years.  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  This was to control seizures and it had a positive effect on that.  The patient has had motor dysfunction and required a tendon release procedure.  When I first saw the patient he was on a combination of Prozac, Keppra and trazodone but continued to struggle with depression.  At that visit we did taper off the Prozac and then started the patient on low-dose venlafaxine.       I also had the patient go for neuropsychological testing.  Please see that data in the chart.      In spring 2018 we did increase the patient's Effexor XL.  In May 2018 we increased the Effexor further to 225 mg a day.     I saw the patient in September 2018 and at that time we did not make any medication changes.     I saw the patient in November 2019.  At that time he was complaining of lower motivation and some difficulty with sleep and poor sleep hygiene.  There were some more situational stressors including the death of his girlfriends mother.  We did did increase the patient's Effexor to 300 mg a day.     I saw the patient in April 2019.  He was doing well at that time.  The increase in Effexor had been helpful.  We did not make any medication changes at that time.     I saw  the patient in October 2019.  He had been furloughed from work and was looking for another job.  He was a bit more depressed and frustrated.  His grandmother was dying with dementia which was difficult for him.  His brother was being deployed to [place].  We did increase his Effexor at that time to 375 mg a da I saw the patient in January 2020. The patient was doing well at that time we did not make any medication changes.     I interviewed the patient as well as a staff member by phone in September 2020.  The patient believes the meds were somewhat low and both he and the staff are requesting to continue with current treatment plan.  We did not make any changes at that visit.        I saw the patient in March 2021.Patient presents today for the purposes of medication management.  He was doing well at that time.  He had recently returned from a family trip to [place].  He was tolerating the medication.  We did not make any changes.    I saw the patient in August 2021.  He was doing well and he recently started a job at the [employer].  His social isolation was improving.  His mood was good.  He was tolerating the medication and we did not make any medication changes at that visit.    The patient was seen in April 2022 and was doing well at that time.  He had recently left his job at [employer] and was going to start being a .  We did not make any medication changes.    The patient was seen for a follow-up on August 2, 2022.  He was doing extremely well at that time and tolerating the medication.  We did not make any medication changes.    HPI:  Patient presents today for the purposes of medication management.   I had an opportunity to interview the patient today as well as his staff member Daphne and both reported things were going extremely well.  The patient did not have any current concerns or complaints.  He denied having any depression.  No feelings of hopelessness or worthlessness or guilt.  No  thoughts of hurting himself or anybody else.  No hallucinations or delusions.  He told me he had a few nights of not sleeping well but then last night slept extremely well.  He denied having any psychotic symptoms.  No tamiko.  Appetite is unchanged.  Concentration and focus are unchanged.  He has been enjoying going to Voodoo and Voodoo support groups.  He reports he feels like things are going well socially for him.  He has no new medical issues or concerns.  No side effects to the medication.  He does not feel he needs any medication changes at this time.     We discussed some treatment options and have elected to continue with the current treatment plan.      Current Medications: Please see chart. Medications personally reviewed.     There are no problems to display for this patient.    No past medical history on file.  No past surgical history on file.  No family history on file.  Current Outpatient Medications   Medication Sig Dispense Refill     docusate sodium (COLACE) 100 MG capsule [DOCUSATE SODIUM (COLACE) 100 MG CAPSULE] Take 100 mg by mouth 2 (two) times a day as needed for constipation.       ibuprofen (ADVIL,MOTRIN) 200 MG tablet [IBUPROFEN (ADVIL,MOTRIN) 200 MG TABLET] Take 400 mg by mouth every 6 (six) hours as needed for pain.       levETIRAcetam (KEPPRA) 750 MG tablet [LEVETIRACETAM (KEPPRA) 750 MG TABLET] Take 3,000 mg by mouth daily.       psyllium with dextrose (METAMUCIL JAR) powder [PSYLLIUM WITH DEXTROSE (METAMUCIL JAR) POWDER] Take by mouth 3 (three) times a day as needed.       traZODone (DESYREL) 50 MG tablet [TRAZODONE (DESYREL) 50 MG TABLET] Take 50 mg by mouth at bedtime.       venlafaxine (EFFEXOR XR) 150 MG 24 hr capsule TAKE 2 CAPSULES (300MG) BY MOUTH ONCE DAILY 62 capsule 11     venlafaxine (EFFEXOR-XR) 75 MG 24 hr capsule TAKE 1 CAPSULE BY MOUTH ONCE DAILY (ALONG WITH 150MG CAPSULES FOR A TOTAL DOSE OF 375MG) 31 capsule 11     carbamide peroxide (DEBROX) 6.5 % otic solution  [CARBAMIDE PEROXIDE (DEBROX) 6.5 % OTIC SOLUTION] Administer 10 drops into both ears as needed. (Patient not taking: Reported on 11/3/2022)       dextromethorphan-guaiFENesin (ROBITUSSIN-DM)  mg/5 mL liquid [DEXTROMETHORPHAN-GUAIFENESIN (ROBITUSSIN-DM)  MG/5 ML LIQUID] Take 10 mL by mouth every 12 (twelve) hours. (Patient not taking: Reported on 11/3/2022)       hydrocortisone 1 % cream [HYDROCORTISONE 1 % CREAM] Apply topically 3 (three) times a day. (Patient not taking: Reported on 11/3/2022)       loperamide (IMODIUM) 2 mg capsule [LOPERAMIDE (IMODIUM) 2 MG CAPSULE] Take 2 mg by mouth 4 (four) times a day as needed for diarrhea. (Patient not taking: Reported on 11/3/2022)         Allergies   Allergen Reactions     Tegretol [Carbamazepine] Unknown     Social History     Socioeconomic History     Marital status: Single     Spouse name: Not on file     Number of children: Not on file     Years of education: Not on file     Highest education level: Not on file   Occupational History     Not on file   Tobacco Use     Smoking status: Never     Smokeless tobacco: Never   Substance and Sexual Activity     Alcohol use: Not on file     Drug use: Not on file     Sexual activity: Not on file   Other Topics Concern     Not on file   Social History Narrative     Not on file     Social Determinants of Health     Financial Resource Strain: Not on file   Food Insecurity: Not on file   Transportation Needs: Not on file   Physical Activity: Not on file   Stress: Not on file   Social Connections: Not on file   Intimate Partner Violence: Not on file   Housing Stability: Not on file       The following portions of the patient's history were reviewed and updated as appropriate: allergies, current medications, past family history, past medical history, past social history, past surgical history and problem list.    Review of Systems  A comprehensive review of systems was negative except for what is noted above    Mental  Status Exam  Alertness: Calm and alert.  Participating well.  No reports of any sedation.  Appearance: Pleasant and cooperative throughout the interview.  Comfortable and calm.  Patient is able to initiate and participate.  Behavior/Demeanor: The staff report no behavioral changes or difficulties.  He has been pleasant and calm.  There is been no agitation.  With me he was pleasant and cooperative and appreciative.  Speech: Sentence structure was intact.  Not pressured or rambling.  Answers were consistent and appropriate.  He is able to initiate.  Answers were consistent and appropriate.  Psychomotor: No slowing.  No agitation or irritability.  Staff report no changes.  Mood: Bright with no evidence of any changes or difficulties.  No depression.  No tamiko.  No irritability.  He shows a good sense of humor and participates well.  Affect: Bright with no lability or irritability  Thought Process/Associations: Tracking adequately.  Participating well.  Processing information adequately.  Thought Content: No hallucinations or delusions  Perception: Grossly intact  Insight: No change.  Grossly intact  Judgment: Grossly intact  Attention/Concentration: Attentive and tracking well.  Follow the conversation well.  Able to initiate.  Language: Grossly intact  Fund of Knowledge: Grossly intact  Memory: He reports no changes.  No obvious changes.       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     Plan:  Medication Adjustment:  I will make no changes at this time.    Other:   Patient will return to clinic in  3 to 4 months. They agree to call or return sooner with any questions or concerns.  Risks and benefits were discussed.      Continue with the support of the clinic, reassurance, and redirection. Staff monitoring and ongoing assessments per team plan. Current psychotropic medication appears to represent the minimum  effective dosage and appears medically necessary. We will continue to monitor and reassess. This team will utilize appropriate emergency services if necessary. I will make myself available if concerns or problems arise.    Total time spent with the patient today was 26 minutes with greater than 50% of the time spent in counseling and care coordination. The patient agrees to call before then with any questions, concerns or problems. We will assess for the appropriateness of possible psychotropic medication trials/changes. The patient will seek out appropriate emergency services should that become necessary.    Video Visit Details    Type of service: Video Visit    Video Start Time: 10:30 AM    Video End Time: 10:43 AM    Total time for video call: 13 minutes    Originating Location: Patient's home    Distant Location:  [facility]    Mode of Communication: Call via [facility]      Total time for chart review, patient and staff interview, documentation and coordination of care is 25 minutes.    Patient Instructions   It was nice speaking with you today for our office video visit. The following is a summary of our visit.    General Information:    If lab work was done today as part of your evaluation you will generally be contacted via My Chart, mail, or phone with the results within 1-5 days. If there is an alarming result we will contact you by phone. Lab results come back at varying times, I generally wait until all labs are resulted before making comments on results. Please note labs are automatically released to My Chart once available.     If you need refills please contact your pharmacist. They will send a refill request to me to review. Please allow 3 business days for us to process all refill requests.     Please call or send a medical message through My Chart, with any questions or concerns.    If you need any paperwork completed please fax forms to [phone]. Please state  if you would like a copy of the completed paperwork, mailed or faxed back to the patient and a fax number to fax the paperwork to. Please allow up to 10 business days for paperwork to be completed.    Ren Manuel MD

## 2023-02-16 ENCOUNTER — VIRTUAL VISIT (OUTPATIENT)
Dept: NEUROLOGY | Facility: CLINIC | Age: 33
End: 2023-02-16
Payer: MEDICARE

## 2023-02-16 ENCOUNTER — TELEPHONE (OUTPATIENT)
Dept: NEUROLOGY | Facility: CLINIC | Age: 33
End: 2023-02-16

## 2023-02-16 DIAGNOSIS — F06.30 MOOD DISORDER IN CONDITIONS CLASSIFIED ELSEWHERE: ICD-10-CM

## 2023-02-16 DIAGNOSIS — R41.89 NEUROCOGNITIVE DEFICITS: ICD-10-CM

## 2023-02-16 DIAGNOSIS — R29.818 NEUROCOGNITIVE DEFICITS: ICD-10-CM

## 2023-02-16 PROCEDURE — 99213 OFFICE O/P EST LOW 20 MIN: CPT | Mod: 95 | Performed by: PSYCHIATRY & NEUROLOGY

## 2023-02-16 RX ORDER — TRAZODONE HYDROCHLORIDE 50 MG/1
50 TABLET, FILM COATED ORAL AT BEDTIME
Qty: 30 TABLET | Refills: 3 | Status: SHIPPED | OUTPATIENT
Start: 2023-02-16 | End: 2023-05-16

## 2023-02-16 RX ORDER — VENLAFAXINE HYDROCHLORIDE 150 MG/1
CAPSULE, EXTENDED RELEASE ORAL
Qty: 62 CAPSULE | Refills: 11 | Status: SHIPPED | OUTPATIENT
Start: 2023-02-16 | End: 2023-05-16

## 2023-02-16 NOTE — PROGRESS NOTES
Outpatient Follow up TBI Evaluation     Pertinent History:  The patient was referred to this clinic for further assessment and treatment . Patient first saw me January 2 of 2018.  The patient had been followed at that time by Dr. Nguyen in the primary care clinic.  The patient has a history of Werner's syndrome diagnosed at age 3.  He had been followed by pediatric neurology.  He been on multiple medications over the years.  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  This was to control seizures and it had a positive effect on that.  The patient has had motor dysfunction and required a tendon release procedure.  When I first saw the patient he was on a combination of Prozac, Keppra and trazodone but continued to struggle with depression.  At that visit we did taper off the Prozac and then started the patient on low-dose venlafaxine.       I also had the patient go for neuropsychological testing.  Please see that data in the chart.      In spring 2018 we did increase the patient's Effexor XL.  In May 2018 we increased the Effexor further to 225 mg a day.     I saw the patient in September 2018 and at that time we did not make any medication changes.     I saw the patient in November 2019.  At that time he was complaining of lower motivation and some difficulty with sleep and poor sleep hygiene.  There were some more situational stressors including the death of his girlfriends mother.  We did did increase the patient's Effexor to 300 mg a day.     I saw the patient in April 2019.  He was doing well at that time.  The increase in Effexor had been helpful.  We did not make any medication changes at that time.     I saw the patient in October 2019.  He had been furloughed from work and was looking for another job.  He was a bit more depressed and frustrated.  His grandmother was dying with dementia which was difficult for him.  His brother was being deployed to [place].  We did increase his Effexor  at that time to 375 mg a da I saw the patient in January 2020. The patient was doing well at that time we did not make any medication changes.     I interviewed the patient as well as a staff member by phone in September 2020.  The patient believes the meds were somewhat low and both he and the staff are requesting to continue with current treatment plan.  We did not make any changes at that visit.        I saw the patient in March 2021.Patient presents today for the purposes of medication management.  He was doing well at that time.  He had recently returned from a family trip to [place].  He was tolerating the medication.  We did not make any changes.    I saw the patient in August 2021.  He was doing well and he recently started a job at the [employer].  His social isolation was improving.  His mood was good.  He was tolerating the medication and we did not make any medication changes at that visit.    The patient was seen in April 2022 and was doing well at that time.  He had recently left his job at [employer] and was going to start being a .  We did not make any medication changes.    The patient was seen for a follow-up on August 2, 2022.  He was doing extremely well at that time and tolerating the medication.  We did not make any medication changes.    The patient had a follow-up visit with me in November 2022.  I also spoke with the patient's staff and both reported things were going extremely well.  He was tolerating the medication and we elected to continue with the treatment plan and make no changes at that time.    HPI:  Patient presents today for the purposes of medication management.   The patient and staff were interviewed and both reported the patient was doing extremely well.  The patient denied having any depression.  No feelings of hopelessness or worthlessness or guilt.  No thoughts of hurting himself or anybody else.  No hallucinations or delusions.  The patient reported no  behavioral concerns.  He was sleeping well and eating well.  No change in cognition or concentration.  No new medical issues or concerns and no side effects of the medication.  We again discussed the risks and benefits of the medication and I suggested we try and decrease the Effexor down to 300 mg a day and both the patient and staff were in agreement.     We discussed some treatment options and have elected to decrease the Effexor extended release to 300 mg a day.    Current Medications: Please see chart. Medications personally reviewed.     There are no problems to display for this patient.    No past medical history on file.  No past surgical history on file.  No family history on file.  Current Outpatient Medications   Medication Sig Dispense Refill     carbamide peroxide (DEBROX) 6.5 % otic solution [CARBAMIDE PEROXIDE (DEBROX) 6.5 % OTIC SOLUTION] Administer 10 drops into both ears as needed. (Patient not taking: Reported on 11/3/2022)       dextromethorphan-guaiFENesin (ROBITUSSIN-DM)  mg/5 mL liquid [DEXTROMETHORPHAN-GUAIFENESIN (ROBITUSSIN-DM)  MG/5 ML LIQUID] Take 10 mL by mouth every 12 (twelve) hours. (Patient not taking: Reported on 11/3/2022)       docusate sodium (COLACE) 100 MG capsule [DOCUSATE SODIUM (COLACE) 100 MG CAPSULE] Take 100 mg by mouth 2 (two) times a day as needed for constipation.       hydrocortisone 1 % cream [HYDROCORTISONE 1 % CREAM] Apply topically 3 (three) times a day. (Patient not taking: Reported on 11/3/2022)       ibuprofen (ADVIL,MOTRIN) 200 MG tablet [IBUPROFEN (ADVIL,MOTRIN) 200 MG TABLET] Take 400 mg by mouth every 6 (six) hours as needed for pain.       levETIRAcetam (KEPPRA) 750 MG tablet [LEVETIRACETAM (KEPPRA) 750 MG TABLET] Take 3,000 mg by mouth daily.       loperamide (IMODIUM) 2 mg capsule [LOPERAMIDE (IMODIUM) 2 MG CAPSULE] Take 2 mg by mouth 4 (four) times a day as needed for diarrhea. (Patient not taking: Reported on 11/3/2022)       psyllium with  dextrose (METAMUCIL JAR) powder [PSYLLIUM WITH DEXTROSE (METAMUCIL JAR) POWDER] Take by mouth 3 (three) times a day as needed.       traZODone (DESYREL) 50 MG tablet [TRAZODONE (DESYREL) 50 MG TABLET] Take 50 mg by mouth at bedtime.       venlafaxine (EFFEXOR XR) 150 MG 24 hr capsule TAKE 2 CAPSULES (300MG) BY MOUTH ONCE DAILY 62 capsule 11     venlafaxine (EFFEXOR-XR) 75 MG 24 hr capsule TAKE 1 CAPSULE BY MOUTH ONCE DAILY (ALONG WITH 150MG CAPSULES FOR A TOTAL DOSE OF 375MG) 31 capsule 11       Allergies   Allergen Reactions     Tegretol [Carbamazepine] Unknown     Social History     Socioeconomic History     Marital status: Single     Spouse name: Not on file     Number of children: Not on file     Years of education: Not on file     Highest education level: Not on file   Occupational History     Not on file   Tobacco Use     Smoking status: Never     Smokeless tobacco: Never   Substance and Sexual Activity     Alcohol use: Not on file     Drug use: Not on file     Sexual activity: Not on file   Other Topics Concern     Not on file   Social History Narrative     Not on file     Social Determinants of Health     Financial Resource Strain: Not on file   Food Insecurity: Not on file   Transportation Needs: Not on file   Physical Activity: Not on file   Stress: Not on file   Social Connections: Not on file   Intimate Partner Violence: Not on file   Housing Stability: Not on file       The following portions of the patient's history were reviewed and updated as appropriate: allergies, current medications, past family history, past medical history, past social history, past surgical history and problem list.    Review of Systems  A comprehensive review of systems was negative except for what is noted above    Mental Status Exam  Alertness: Calm and alert.  No reports of any recent fatigue.  Appearance: Able to initiate and participate.  Pleasant with a good sense of humor.  Behavior/Demeanor: The staff report no  behavioral changes or difficulties.  No agitation or irritability.  No evidence of any tamiko.  Speech: Sentence structure was intact.  Able to initiate and participate.  Not pressured or rambling.  Answers were consistent and appropriate.  He is able to initiate.  Psychomotor: No slowing.  No evidence of any tamiko or acceleration.  Mood: Bright with no evidence of any changes or difficulties.  No depression.  No tamiko.  No irritability.  He shows a good sense of humor and participates well.  No change from our last interview.    Affect: Bright with no lability or irritability.  Participating appropriately.  Thought Process/Associations: Tracking adequately.  Participating well.  Processing information adequately.  Not disorganized.  No racing thoughts.  Thought Content: No hallucinations or delusions  Perception: Grossly intact  Insight: No change.  Grossly intact  Judgment: Grossly intact  Attention/Concentration: Attentive and tracking well.  Follow the conversation well.  Able to initiate.  Language: Grossly intact  Fund of Knowledge: Grossly intact  Memory: He reports no changes.  No obvious changes.       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     Plan:  Medication Adjustment:  I am going to decrease the Effexor XR to 300 mg a day as the patient is doing well.    Other:   Patient will return to clinic in  3 to 4 months. They agree to call or return sooner with any questions or concerns.  Risks and benefits were discussed.      Continue with the support of the clinic, reassurance, and redirection. Staff monitoring and ongoing assessments per team plan. Current psychotropic medication appears to represent the minimum effective dosage and appears medically necessary. We will continue to monitor and reassess. This team will utilize appropriate emergency services if necessary. I will make myself available  if concerns or problems arise.    Total time spent with the patient today was 25 minutes with greater than 50% of the time spent in counseling and care coordination. The patient agrees to call before then with any questions, concerns or problems. We will assess for the appropriateness of possible psychotropic medication trials/changes. The patient will seek out appropriate emergency services should that become necessary.    Video Visit Details    Type of service: Video Visit    Total time for call: 14 minutes    Originating Location: Patient's home    Distant Location:  [facility]    Mode of Communication: Call via [facility]      Total time for chart review, patient and staff interview, documentation and coordination of care is 25 minutes.    Patient Instructions   It was nice speaking with you today for our office video visit. The following is a summary of our visit.    General Information:    If lab work was done today as part of your evaluation you will generally be contacted via My Chart, mail, or phone with the results within 1-5 days. If there is an alarming result we will contact you by phone. Lab results come back at varying times, I generally wait until all labs are resulted before making comments on results. Please note labs are automatically released to My Chart once available.     If you need refills please contact your pharmacist. They will send a refill request to me to review. Please allow 3 business days for us to process all refill requests.     Please call or send a medical message through My Chart, with any questions or concerns.    If you need any paperwork completed please fax forms to [phone]. Please state if you would like a copy of the completed paperwork, mailed or faxed back to the patient and a fax number to fax the paperwork to. Please allow up to 10 business days for paperwork to be completed.    Ren Manuel MD

## 2023-02-16 NOTE — PATIENT INSTRUCTIONS
The patient is doing well and tolerating the medication.  I elected to decrease his Effexor XR down to 300 mg a day.  Risks and benefits were discussed.  The patient should return to this clinic and 2-3 months for medication check and agrees to contact us before then with any questions or concerns.  The patient will need a pharmacy set up.

## 2023-02-16 NOTE — NURSING NOTE
Is the patient currently in the state of [place]? YES    Visit mode:TELEPHONE    If the visit is dropped, the patient can be reconnected by: TELEPHONE VISIT: Phone number: [phone]    Will anyone else be joining the visit? NO      How would you like to obtain your AVS? MyChart    Are changes needed to the allergy or medication list? NO    Comments or concerns regarding today's visit: NO

## 2023-02-16 NOTE — LETTER
2/16/2023         RE: Nicholas Daugherty  [address]  [address]  [facility] [address]        Dear Colleague,    Thank you for referring your patient, Nicholas Daugherty, to the [facility]UROLOG[facility]IC [facility]. Please see a copy of my visit note below.      Outpatient Follow up TBI Evaluation     Pertinent History:  The patient was referred to this clinic for further assessment and treatment . Patient first saw me January 2 of 2018.  The patient had been followed at that time by Dr. Nguyen in the primary care clinic.  The patient has a history of Werner's syndrome diagnosed at age 3.  He had been followed by pediatric neurology.  He been on multiple medications over the years.  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  This was to control seizures and it had a positive effect on that.  The patient has had motor dysfunction and required a tendon release procedure.  When I first saw the patient he was on a combination of Prozac, Keppra and trazodone but continued to struggle with depression.  At that visit we did taper off the Prozac and then started the patient on low-dose venlafaxine.       I also had the patient go for neuropsychological testing.  Please see that data in the chart.      In spring 2018 we did increase the patient's Effexor XL.  In May 2018 we increased the Effexor further to 225 mg a day.     I saw the patient in September 2018 and at that time we did not make any medication changes.     I saw the patient in November 2019.  At that time he was complaining of lower motivation and some difficulty with sleep and poor sleep hygiene.  There were some more situational stressors including the death of his girlfriends mother.  We did did increase the patient's Effexor to 300 mg a day.     I saw the patient in April 2019.  He was doing well at that time.  The increase in Effexor had been helpful.  We did not make any medication changes at that time.     I saw the patient in  October 2019.  He had been furloughed from work and was looking for another job.  He was a bit more depressed and frustrated.  His grandmother was dying with dementia which was difficult for him.  His brother was being deployed to [place].  We did increase his Effexor at that time to 375 mg a da I saw the patient in January 2020. The patient was doing well at that time we did not make any medication changes.     I interviewed the patient as well as a staff member by phone in September 2020.  The patient believes the meds were somewhat low and both he and the staff are requesting to continue with current treatment plan.  We did not make any changes at that visit.        I saw the patient in March 2021.Patient presents today for the purposes of medication management.  He was doing well at that time.  He had recently returned from a family trip to [place].  He was tolerating the medication.  We did not make any changes.    I saw the patient in August 2021.  He was doing well and he recently started a job at [employer].  His social isolation was improving.  His mood was good.  He was tolerating the medication and we did not make any medication changes at that visit.    The patient was seen in April 2022 and was doing well at that time.  He had recently left his job at [employer] and was going to start being a .  We did not make any medication changes.    The patient was seen for a follow-up on August 2, 2022.  He was doing extremely well at that time and tolerating the medication.  We did not make any medication changes.    The patient had a follow-up visit with me in November 2022.  I also spoke with the patient's staff and both reported things were going extremely well.  He was tolerating the medication and we elected to continue with the treatment plan and make no changes at that time.    HPI:  Patient presents today for the purposes of medication management.   The patient and staff were interviewed  and both reported the patient was doing extremely well.  The patient denied having any depression.  No feelings of hopelessness or worthlessness or guilt.  No thoughts of hurting himself or anybody else.  No hallucinations or delusions.  The patient reported no behavioral concerns.  He was sleeping well and eating well.  No change in cognition or concentration.  No new medical issues or concerns and no side effects of the medication.  We again discussed the risks and benefits of the medication and I suggested we try and decrease the Effexor down to 300 mg a day and both the patient and staff were in agreement.     We discussed some treatment options and have elected to decrease the Effexor extended release to 300 mg a day.    Current Medications: Please see chart. Medications personally reviewed.     There are no problems to display for this patient.    No past medical history on file.  No past surgical history on file.  No family history on file.  Current Outpatient Medications   Medication Sig Dispense Refill     carbamide peroxide (DEBROX) 6.5 % otic solution [CARBAMIDE PEROXIDE (DEBROX) 6.5 % OTIC SOLUTION] Administer 10 drops into both ears as needed. (Patient not taking: Reported on 11/3/2022)       dextromethorphan-guaiFENesin (ROBITUSSIN-DM)  mg/5 mL liquid [DEXTROMETHORPHAN-GUAIFENESIN (ROBITUSSIN-DM)  MG/5 ML LIQUID] Take 10 mL by mouth every 12 (twelve) hours. (Patient not taking: Reported on 11/3/2022)       docusate sodium (COLACE) 100 MG capsule [DOCUSATE SODIUM (COLACE) 100 MG CAPSULE] Take 100 mg by mouth 2 (two) times a day as needed for constipation.       hydrocortisone 1 % cream [HYDROCORTISONE 1 % CREAM] Apply topically 3 (three) times a day. (Patient not taking: Reported on 11/3/2022)       ibuprofen (ADVIL,MOTRIN) 200 MG tablet [IBUPROFEN (ADVIL,MOTRIN) 200 MG TABLET] Take 400 mg by mouth every 6 (six) hours as needed for pain.       levETIRAcetam (KEPPRA) 750 MG tablet  [LEVETIRACETAM (KEPPRA) 750 MG TABLET] Take 3,000 mg by mouth daily.       loperamide (IMODIUM) 2 mg capsule [LOPERAMIDE (IMODIUM) 2 MG CAPSULE] Take 2 mg by mouth 4 (four) times a day as needed for diarrhea. (Patient not taking: Reported on 11/3/2022)       psyllium with dextrose (METAMUCIL JAR) powder [PSYLLIUM WITH DEXTROSE (METAMUCIL JAR) POWDER] Take by mouth 3 (three) times a day as needed.       traZODone (DESYREL) 50 MG tablet [TRAZODONE (DESYREL) 50 MG TABLET] Take 50 mg by mouth at bedtime.       venlafaxine (EFFEXOR XR) 150 MG 24 hr capsule TAKE 2 CAPSULES (300MG) BY MOUTH ONCE DAILY 62 capsule 11     venlafaxine (EFFEXOR-XR) 75 MG 24 hr capsule TAKE 1 CAPSULE BY MOUTH ONCE DAILY (ALONG WITH 150MG CAPSULES FOR A TOTAL DOSE OF 375MG) 31 capsule 11       Allergies   Allergen Reactions     Tegretol [Carbamazepine] Unknown     Social History     Socioeconomic History     Marital status: Single     Spouse name: Not on file     Number of children: Not on file     Years of education: Not on file     Highest education level: Not on file   Occupational History     Not on file   Tobacco Use     Smoking status: Never     Smokeless tobacco: Never   Substance and Sexual Activity     Alcohol use: Not on file     Drug use: Not on file     Sexual activity: Not on file   Other Topics Concern     Not on file   Social History Narrative     Not on file     Social Determinants of Health     Financial Resource Strain: Not on file   Food Insecurity: Not on file   Transportation Needs: Not on file   Physical Activity: Not on file   Stress: Not on file   Social Connections: Not on file   Intimate Partner Violence: Not on file   Housing Stability: Not on file       The following portions of the patient's history were reviewed and updated as appropriate: allergies, current medications, past family history, past medical history, past social history, past surgical history and problem list.    Review of Systems  A comprehensive review  of systems was negative except for what is noted above    Mental Status Exam  Alertness: Calm and alert.  No reports of any recent fatigue.  Appearance: Able to initiate and participate.  Pleasant with a good sense of humor.  Behavior/Demeanor: The staff report no behavioral changes or difficulties.  No agitation or irritability.  No evidence of any tamiko.  Speech: Sentence structure was intact.  Able to initiate and participate.  Not pressured or rambling.  Answers were consistent and appropriate.  He is able to initiate.  Psychomotor: No slowing.  No evidence of any tamiko or acceleration.  Mood: Bright with no evidence of any changes or difficulties.  No depression.  No tamiko.  No irritability.  He shows a good sense of humor and participates well.  No change from our last interview.    Affect: Bright with no lability or irritability.  Participating appropriately.  Thought Process/Associations: Tracking adequately.  Participating well.  Processing information adequately.  Not disorganized.  No racing thoughts.  Thought Content: No hallucinations or delusions  Perception: Grossly intact  Insight: No change.  Grossly intact  Judgment: Grossly intact  Attention/Concentration: Attentive and tracking well.  Follow the conversation well.  Able to initiate.  Language: Grossly intact  Fund of Knowledge: Grossly intact  Memory: He reports no changes.  No obvious changes.       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     Plan:  Medication Adjustment:  I am going to decrease the Effexor XR to 300 mg a day as the patient is doing well.    Other:   Patient will return to clinic in  3 to 4 months. They agree to call or return sooner with any questions or concerns.  Risks and benefits were discussed.      Continue with the support of the clinic, reassurance, and redirection. Staff monitoring and ongoing assessments per  team plan. Current psychotropic medication appears to represent the minimum effective dosage and appears medically necessary. We will continue to monitor and reassess. This team will utilize appropriate emergency services if necessary. I will make myself available if concerns or problems arise.    Total time spent with the patient today was 25 minutes with greater than 50% of the time spent in counseling and care coordination. The patient agrees to call before then with any questions, concerns or problems. We will assess for the appropriateness of possible psychotropic medication trials/changes. The patient will seek out appropriate emergency services should that become necessary.    Video Visit Details    Type of service: Video Visit    Total time for call: 14 minutes    Originating Location: Patient's home    Distant Location:  [facility]    Mode of Communication: Call via [employer]      Total time for chart review, patient and staff interview, documentation and coordination of care is 25 minutes.    Patient Instructions   It was nice speaking with you today for our office video visit. The following is a summary of our visit.    General Information:    If lab work was done today as part of your evaluation you will generally be contacted via My Chart, mail, or phone with the results within 1-5 days. If there is an alarming result we will contact you by phone. Lab results come back at varying times, I generally wait until all labs are resulted before making comments on results. Please note labs are automatically released to My Chart once available.     If you need refills please contact your pharmacist. They will send a refill request to me to review. Please allow 3 business days for us to process all refill requests.     Please call or send a medical message through My Chart, with any questions or concerns.    If you need any paperwork completed please fax forms to [phone].  Please state if you would like a copy of the completed paperwork, mailed or faxed back to the patient and a fax number to fax the paperwork to. Please allow up to 10 business days for paperwork to be completed.    Ren Manuel MD      Again, thank you for allowing me to participate in the care of your patient.        Sincerely,        Ren Manuel MD

## 2023-05-16 ENCOUNTER — VIRTUAL VISIT (OUTPATIENT)
Dept: NEUROLOGY | Facility: CLINIC | Age: 33
End: 2023-05-16
Payer: MEDICARE

## 2023-05-16 DIAGNOSIS — R29.818 NEUROCOGNITIVE DEFICITS: ICD-10-CM

## 2023-05-16 DIAGNOSIS — R41.89 NEUROCOGNITIVE DEFICITS: ICD-10-CM

## 2023-05-16 PROCEDURE — 99442 PR PHYSICIAN TELEPHONE EVALUATION 11-20 MIN: CPT | Mod: 95 | Performed by: PSYCHIATRY & NEUROLOGY

## 2023-05-16 RX ORDER — FERROUS SULFATE 324(65)MG
TABLET, DELAYED RELEASE (ENTERIC COATED) ORAL
COMMUNITY
Start: 2023-04-06

## 2023-05-16 RX ORDER — VENLAFAXINE HYDROCHLORIDE 150 MG/1
CAPSULE, EXTENDED RELEASE ORAL
Qty: 62 CAPSULE | Refills: 11 | Status: SHIPPED | OUTPATIENT
Start: 2023-05-16 | End: 2023-08-01 | Stop reason: DRUGHIGH

## 2023-05-16 RX ORDER — TRAZODONE HYDROCHLORIDE 50 MG/1
50 TABLET, FILM COATED ORAL AT BEDTIME
Qty: 30 TABLET | Refills: 3 | Status: SHIPPED | OUTPATIENT
Start: 2023-05-16 | End: 2023-08-01

## 2023-05-16 NOTE — PROGRESS NOTES
Virtual Visit Details    Type of service:  Telephone Visit   Phone call duration: 14 minutes       Outpatient Follow up TBI Evaluation     Pertinent History:  The patient was referred to this clinic for further assessment and treatment . Patient first saw me January 2 of 2018.  The patient had been followed at that time by Dr. Nguyen in the primary care clinic.  The patient has a history of Werner's syndrome diagnosed at age 3.  He had been followed by pediatric neurology.  He been on multiple medications over the years.  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  This was to control seizures and it had a positive effect on that.  The patient has had motor dysfunction and required a tendon release procedure.  When I first saw the patient he was on a combination of Prozac, Keppra and trazodone but continued to struggle with depression.  At that visit we did taper off the Prozac and then started the patient on low-dose venlafaxine.       I also had the patient go for neuropsychological testing.  Please see that data in the chart.      In spring 2018 we did increase the patient's Effexor XL.  In May 2018 we increased the Effexor further to 225 mg a day.     I saw the patient in September 2018 and at that time we did not make any medication changes.     I saw the patient in November 2019.  At that time he was complaining of lower motivation and some difficulty with sleep and poor sleep hygiene.  There were some more situational stressors including the death of his girlfriends mother.  We did did increase the patient's Effexor to 300 mg a day.     I saw the patient in April 2019.  He was doing well at that time.  The increase in Effexor had been helpful.  We did not make any medication changes at that time.     I saw the patient in October 2019.  He had been furloughed from work and was looking for another job.  He was a bit more depressed and frustrated.  His grandmother was dying with dementia  which was difficult for him.  His brother was being deployed to [place].  We did increase his Effexor at that time to 375 mg a da I saw the patient in January 2020. The patient was doing well at that time we did not make any medication changes.     I interviewed the patient as well as a staff member by phone in September 2020.  The patient believes the meds were somewhat low and both he and the staff are requesting to continue with current treatment plan.  We did not make any changes at that visit.        I saw the patient in March 2021.Patient presents today for the purposes of medication management.  He was doing well at that time.  He had recently returned from a family trip to [place].  He was tolerating the medication.  We did not make any changes.    I saw the patient in August 2021.  He was doing well and he recently started a job at [employer].  His social isolation was improving.  His mood was good.  He was tolerating the medication and we did not make any medication changes at that visit.    The patient was seen in April 2022 and was doing well at that time.  He had recently left his job at [employer] and was going to start being a .  We did not make any medication changes.    The patient was seen for a follow-up on August 2, 2022.  He was doing extremely well at that time and tolerating the medication.  We did not make any medication changes.    The patient had a follow-up visit with me in November 2022.  I also spoke with the patient's staff and both reported things were going extremely well.  He was tolerating the medication and we elected to continue with the treatment plan and make no changes at that time.    The patient had a follow-up appointment with me in February and was doing well and tolerating the medication.  No new medical issues or concerns and he was neurologically stable.  We elected to further decrease his Effexor down to 300 mg a day.    HPI:  Patient presents today for  the purposes of medication management.   The patient had a phone interview today and told me he was doing extremely well and has noted no difference with the decrease in Effexor.  He stated his mood was good.  He did not feel hopeless or helpless or worthless.  No thoughts of hurting himself or anybody else.  No tamiko.  No psychosis.  He was sleeping well and eating well.  No change in concentration or focus.  He was getting outside and doing some exercise.  He is currently looking for a new job.  He spends time with family and friends and is doing well socially.  No other new medical issues or concerns and no side effects to the medication.  We discussed a variety of treatment options as well as differential diagnoses and have elected to continue with the current medications but would consider a further decrease in the Effexor in the future.     We discussed some treatment options and have elected to continue with the current dose of Effexor but consider a future reduction.    Current Medications: Please see chart. Medications personally reviewed.     There are no problems to display for this patient.    No past medical history on file.  No past surgical history on file.  No family history on file.  Current Outpatient Medications   Medication Sig Dispense Refill     Ferrous Sulfate 324 (65 Fe) MG TBEC        ibuprofen (ADVIL,MOTRIN) 200 MG tablet [IBUPROFEN (ADVIL,MOTRIN) 200 MG TABLET] Take 400 mg by mouth every 6 (six) hours as needed for pain.       levETIRAcetam (KEPPRA) 750 MG tablet [LEVETIRACETAM (KEPPRA) 750 MG TABLET] Take 3,000 mg by mouth daily.       traZODone (DESYREL) 50 MG tablet Take 1 tablet (50 mg) by mouth At Bedtime 30 tablet 3     venlafaxine (EFFEXOR XR) 150 MG 24 hr capsule TAKE 2 CAPSULES (300MG) BY MOUTH ONCE DAILY Strength: 150 mg 62 capsule 11       Allergies   Allergen Reactions     Tegretol [Carbamazepine] Unknown     Social History     Socioeconomic History     Marital status: Single      Spouse name: Not on file     Number of children: Not on file     Years of education: Not on file     Highest education level: Not on file   Occupational History     Not on file   Tobacco Use     Smoking status: Never     Smokeless tobacco: Never   Vaping Use     Vaping status: Not on file   Substance and Sexual Activity     Alcohol use: Not on file     Drug use: Not on file     Sexual activity: Not on file   Other Topics Concern     Not on file   Social History Narrative     Not on file     Social Determinants of Health     Financial Resource Strain: Not on file   Food Insecurity: Not on file   Transportation Needs: Not on file   Physical Activity: Not on file   Stress: Not on file   Social Connections: Not on file   Intimate Partner Violence: Not on file   Housing Stability: Not on file       The following portions of the patient's history were reviewed and updated as appropriate: allergies, current medications, past family history, past medical history, past social history, past surgical history and problem list.    Review of Systems  A comprehensive review of systems was negative except for what is noted above    Mental Status Exam  Alertness: Calm and alert.  He denies any recent changes or difficulties.  Appearance: Comfortable and calm with no evidence of any pain or discomfort.  Behavior/Demeanor: Polite and pleasant with me.  He denies having any recent behavioral difficulties or impulsivity.  Speech: Sentence structure was intact.  Not pressured or rambling.  Able to initiate and participate appropriately.  Psychomotor: No slowing.  No evidence of any tamiko and no significant slowing..  Mood: Bright with no evidence of any changes or difficulties.  No depression.  No tamiko.  Bright.  No evidence of any depression or anxiety.  Affect: Bright with no lability or irritability.  Participating appropriately.  Thought Process/Associations: Tracking well.  Participating well in the conversation.  No racing  thoughts.  Thought Content: No hallucinations or delusions  Perception: Grossly intact  Insight: No change.  Grossly intact  Judgment: Grossly intact  Attention/Concentration: Attentive and tracking well.  No significant recent change.  Able to initiate.  Language: Grossly intact  Fund of Knowledge: Grossly intact  Memory: He reports no changes.  No obvious changes.       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     Plan:  Medication Adjustment:  I will make no changes at this time but would consider a future reduction in the Effexor.    Other:   Patient will return to clinic in  3 to 4 months.  He agrees to call or return sooner with any questions or concerns.  Risks and benefits were discussed.      Continue with the support of the clinic, reassurance, and redirection. Staff monitoring and ongoing assessments per team plan. Current psychotropic medication appears to represent the minimum effective dosage and appears medically necessary. We will continue to monitor and reassess. This team will utilize appropriate emergency services if necessary. I will make myself available if concerns or problems arise.    Video Visit Details    Type of service: Video Visit    Total time for call: 14 minutes    Originating Location: Patient's home    Distant Location:  [facility]    Mode of Communication: Call via [employer]      Total time for chart review, patient and staff interview, documentation and coordination of care is 25 minutes.    Patient Instructions   It was nice speaking with you today for our office video visit. The following is a summary of our visit.    General Information:    If lab work was done today as part of your evaluation you will generally be contacted via My Chart, mail, or phone with the results within 1-5 days. If there is an alarming result we will contact you  by phone. Lab results come back at varying times, I generally wait until all labs are resulted before making comments on results. Please note labs are automatically released to My Chart once available.     If you need refills please contact your pharmacist. They will send a refill request to me to review. Please allow 3 business days for us to process all refill requests.     Please call or send a medical message through My Chart, with any questions or concerns.    If you need any paperwork completed please fax forms to [phone]. Please state if you would like a copy of the completed paperwork, mailed or faxed back to the patient and a fax number to fax the paperwork to. Please allow up to 10 business days for paperwork to be completed.    Ren Manuel MD

## 2023-05-16 NOTE — LETTER
5/16/2023         RE: Nicholas Daugherty  [address]  [address]  [facility] [address]        Dear Colleague,    Thank you for referring your patient, Nicholas Daugherty, to the [facility]UROLOG[facility]. Please see a copy of my visit note below.    Virtual Visit Details    Type of service:  Telephone Visit   Phone call duration: 14 minutes       Outpatient Follow up TBI Evaluation     Pertinent History:  The patient was referred to this clinic for further assessment and treatment . Patient first saw me January 2 of 2018.  The patient had been followed at that time by Dr. Nguyen in the primary care clinic.  The patient has a history of Werner's syndrome diagnosed at age 3.  He had been followed by pediatric neurology.  He been on multiple medications over the years.  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  This was to control seizures and it had a positive effect on that.  The patient has had motor dysfunction and required a tendon release procedure.  When I first saw the patient he was on a combination of Prozac, Keppra and trazodone but continued to struggle with depression.  At that visit we did taper off the Prozac and then started the patient on low-dose venlafaxine.       I also had the patient go for neuropsychological testing.  Please see that data in the chart.      In spring 2018 we did increase the patient's Effexor XL.  In May 2018 we increased the Effexor further to 225 mg a day.     I saw the patient in September 2018 and at that time we did not make any medication changes.     I saw the patient in November 2019.  At that time he was complaining of lower motivation and some difficulty with sleep and poor sleep hygiene.  There were some more situational stressors including the death of his girlfriends mother.  We did did increase the patient's Effexor to 300 mg a day.     I saw the patient in April 2019.  He was doing well at that time.  The increase in Effexor  had been helpful.  We did not make any medication changes at that time.     I saw the patient in October 2019.  He had been furloughed from work and was looking for another job.  He was a bit more depressed and frustrated.  His grandmother was dying with dementia which was difficult for him.  His brother was being deployed to [place].  We did increase his Effexor at that time to 375 mg a da I saw the patient in January 2020. The patient was doing well at that time we did not make any medication changes.     I interviewed the patient as well as a staff member by phone in September 2020.  The patient believes the meds were somewhat low and both he and the staff are requesting to continue with current treatment plan.  We did not make any changes at that visit.        I saw the patient in March 2021.Patient presents today for the purposes of medication management.  He was doing well at that time.  He had recently returned from a family trip to [place].  He was tolerating the medication.  We did not make any changes.    I saw the patient in August 2021.  He was doing well and he recently started a job at [employer].  His social isolation was improving.  His mood was good.  He was tolerating the medication and we did not make any medication changes at that visit.    The patient was seen in April 2022 and was doing well at that time.  He had recently left his job at [employer] and was going to start being a .  We did not make any medication changes.    The patient was seen for a follow-up on August 2, 2022.  He was doing extremely well at that time and tolerating the medication.  We did not make any medication changes.    The patient had a follow-up visit with me in November 2022.  I also spoke with the patient's staff and both reported things were going extremely well.  He was tolerating the medication and we elected to continue with the treatment plan and make no changes at that time.    The patient had  a follow-up appointment with me in February and was doing well and tolerating the medication.  No new medical issues or concerns and he was neurologically stable.  We elected to further decrease his Effexor down to 300 mg a day.    HPI:  Patient presents today for the purposes of medication management.   The patient had a phone interview today and told me he was doing extremely well and has noted no difference with the decrease in Effexor.  He stated his mood was good.  He did not feel hopeless or helpless or worthless.  No thoughts of hurting himself or anybody else.  No tamiko.  No psychosis.  He was sleeping well and eating well.  No change in concentration or focus.  He was getting outside and doing some exercise.  He is currently looking for a new job.  He spends time with family and friends and is doing well socially.  No other new medical issues or concerns and no side effects to the medication.  We discussed a variety of treatment options as well as differential diagnoses and have elected to continue with the current medications but would consider a further decrease in the Effexor in the future.     We discussed some treatment options and have elected to continue with the current dose of Effexor but consider a future reduction.    Current Medications: Please see chart. Medications personally reviewed.     There are no problems to display for this patient.    No past medical history on file.  No past surgical history on file.  No family history on file.  Current Outpatient Medications   Medication Sig Dispense Refill     Ferrous Sulfate 324 (65 Fe) MG TBEC        ibuprofen (ADVIL,MOTRIN) 200 MG tablet [IBUPROFEN (ADVIL,MOTRIN) 200 MG TABLET] Take 400 mg by mouth every 6 (six) hours as needed for pain.       levETIRAcetam (KEPPRA) 750 MG tablet [LEVETIRACETAM (KEPPRA) 750 MG TABLET] Take 3,000 mg by mouth daily.       traZODone (DESYREL) 50 MG tablet Take 1 tablet (50 mg) by mouth At Bedtime 30 tablet 3      venlafaxine (EFFEXOR XR) 150 MG 24 hr capsule TAKE 2 CAPSULES (300MG) BY MOUTH ONCE DAILY Strength: 150 mg 62 capsule 11       Allergies   Allergen Reactions     Tegretol [Carbamazepine] Unknown     Social History     Socioeconomic History     Marital status: Single     Spouse name: Not on file     Number of children: Not on file     Years of education: Not on file     Highest education level: Not on file   Occupational History     Not on file   Tobacco Use     Smoking status: Never     Smokeless tobacco: Never   Vaping Use     Vaping status: Not on file   Substance and Sexual Activity     Alcohol use: Not on file     Drug use: Not on file     Sexual activity: Not on file   Other Topics Concern     Not on file   Social History Narrative     Not on file     Social Determinants of Health     Financial Resource Strain: Not on file   Food Insecurity: Not on file   Transportation Needs: Not on file   Physical Activity: Not on file   Stress: Not on file   Social Connections: Not on file   Intimate Partner Violence: Not on file   Housing Stability: Not on file       The following portions of the patient's history were reviewed and updated as appropriate: allergies, current medications, past family history, past medical history, past social history, past surgical history and problem list.    Review of Systems  A comprehensive review of systems was negative except for what is noted above    Mental Status Exam  Alertness: Calm and alert.  He denies any recent changes or difficulties.  Appearance: Comfortable and calm with no evidence of any pain or discomfort.  Behavior/Demeanor: Polite and pleasant with me.  He denies having any recent behavioral difficulties or impulsivity.  Speech: Sentence structure was intact.  Not pressured or rambling.  Able to initiate and participate appropriately.  Psychomotor: No slowing.  No evidence of any tamiko and no significant slowing..  Mood: Bright with no evidence of any changes or  difficulties.  No depression.  No tamiko.  Bright.  No evidence of any depression or anxiety.  Affect: Bright with no lability or irritability.  Participating appropriately.  Thought Process/Associations: Tracking well.  Participating well in the conversation.  No racing thoughts.  Thought Content: No hallucinations or delusions  Perception: Grossly intact  Insight: No change.  Grossly intact  Judgment: Grossly intact  Attention/Concentration: Attentive and tracking well.  No significant recent change.  Able to initiate.  Language: Grossly intact  Fund of Knowledge: Grossly intact  Memory: He reports no changes.  No obvious changes.       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     Plan:  Medication Adjustment:  I will make no changes at this time but would consider a future reduction in the Effexor.    Other:   Patient will return to clinic in  3 to 4 months.  He agrees to call or return sooner with any questions or concerns.  Risks and benefits were discussed.      Continue with the support of the clinic, reassurance, and redirection. Staff monitoring and ongoing assessments per team plan. Current psychotropic medication appears to represent the minimum effective dosage and appears medically necessary. We will continue to monitor and reassess. This team will utilize appropriate emergency services if necessary. I will make myself available if concerns or problems arise.    Video Visit Details    Type of service: Video Visit    Total time for call: 14 minutes    Originating Location: Patient's home    Distant Location:  [facility]    Mode of Communication: Call via [employer]      Total time for chart review, patient and staff interview, documentation and coordination of care is 25 minutes.    Patient Instructions   It was nice speaking with you today for our office video  visit. The following is a summary of our visit.    General Information:    If lab work was done today as part of your evaluation you will generally be contacted via My Chart, mail, or phone with the results within 1-5 days. If there is an alarming result we will contact you by phone. Lab results come back at varying times, I generally wait until all labs are resulted before making comments on results. Please note labs are automatically released to My Chart once available.     If you need refills please contact your pharmacist. They will send a refill request to me to review. Please allow 3 business days for us to process all refill requests.     Please call or send a medical message through My Chart, with any questions or concerns.    If you need any paperwork completed please fax forms to [phone]. Please state if you would like a copy of the completed paperwork, mailed or faxed back to the patient and a fax number to fax the paperwork to. Please allow up to 10 business days for paperwork to be completed.    Ren Manuel MD      Again, thank you for allowing me to participate in the care of your patient.        Sincerely,        Ren Manuel MD

## 2023-05-16 NOTE — PATIENT INSTRUCTIONS
The patient is doing well and tolerating the medication including the past reduction in the Effexor.  I will make no changes at this time but would consider a future reduction in the Effexor.  The patient should return to this clinic in 3 to 4 months for medication check and agrees to contact us before then with any questions or concerns.

## 2023-05-16 NOTE — NURSING NOTE
Is the patient currently in the state of [place]? YES    Visit mode:TELEPHONE    If the visit is dropped, the patient can be reconnected by: TELEPHONE VISIT: Phone number: [phone]    Will anyone else be joining the visit? NO      How would you like to obtain your AVS? MyChart    Are changes needed to the allergy or medication list? NO    Reason for visit: Telephone (Telephone visit return follow up )

## 2023-08-01 ENCOUNTER — VIRTUAL VISIT (OUTPATIENT)
Dept: NEUROLOGY | Facility: CLINIC | Age: 33
End: 2023-08-01
Payer: MEDICARE

## 2023-08-01 VITALS — HEIGHT: 74 IN | BODY MASS INDEX: 25.03 KG/M2 | WEIGHT: 195 LBS

## 2023-08-01 DIAGNOSIS — R41.89 NEUROCOGNITIVE DEFICITS: ICD-10-CM

## 2023-08-01 DIAGNOSIS — R29.818 NEUROCOGNITIVE DEFICITS: ICD-10-CM

## 2023-08-01 PROCEDURE — 99443 PR PHYSICIAN TELEPHONE EVALUATION 21-30 MIN: CPT | Mod: 95 | Performed by: PSYCHIATRY & NEUROLOGY

## 2023-08-01 RX ORDER — VENLAFAXINE HYDROCHLORIDE 150 MG/1
150 TABLET, EXTENDED RELEASE ORAL DAILY
Qty: 30 TABLET | Refills: 4 | Status: SHIPPED | OUTPATIENT
Start: 2023-08-01 | End: 2023-11-16

## 2023-08-01 RX ORDER — TRAZODONE HYDROCHLORIDE 50 MG/1
50 TABLET, FILM COATED ORAL AT BEDTIME
Qty: 30 TABLET | Refills: 3 | Status: SHIPPED | OUTPATIENT
Start: 2023-08-01 | End: 2023-11-16

## 2023-08-01 ASSESSMENT — PAIN SCALES - GENERAL: PAINLEVEL: NO PAIN (0)

## 2023-08-01 NOTE — LETTER
8/1/2023         RE: Nicholas Daugherty  [address]  [address]  [facility] [address]        Dear Colleague,    Thank you for referring your patient, Nicholas Daugherty, to the [facility] NEUROLOGY CLINIC [facility]. Please see a copy of my visit note below.    Virtual Visit Details    Type of service:  Telephone Visit   Phone call duration: 21 minutes       Outpatient Follow up TBI Evaluation     Pertinent History:  The patient was referred to this clinic for further assessment and treatment . Patient first saw me January 2 of 2018.  The patient had been followed at that time by Dr. Nguyen in the primary care clinic.  The patient has a history of Werner's syndrome diagnosed at age 3.  He had been followed by pediatric neurology.  He been on multiple medications over the years.  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  This was to control seizures and it had a positive effect on that.  The patient has had motor dysfunction and required a tendon release procedure.  When I first saw the patient he was on a combination of Prozac, Keppra and trazodone but continued to struggle with depression.  At that visit we did taper off the Prozac and then started the patient on low-dose venlafaxine.       I also had the patient go for neuropsychological testing.  Please see that data in the chart.      In spring 2018 we did increase the patient's Effexor XL.  In May 2018 we increased the Effexor further to 225 mg a day.     I saw the patient in September 2018 and at that time we did not make any medication changes.     I saw the patient in November 2019.  At that time he was complaining of lower motivation and some difficulty with sleep and poor sleep hygiene.  There were some more situational stressors including the death of his girlfriends mother.  We did did increase the patient's Effexor to 300 mg a day.     I saw the patient in April 2019.  He was doing well at that time.  The increase in Effexor  had been helpful.  We did not make any medication changes at that time.     I saw the patient in October 2019.  He had been furloughed from work and was looking for another job.  He was a bit more depressed and frustrated.  His grandmother was dying with dementia which was difficult for him.  His brother was being deployed to [place].  We did increase his Effexor at that time to 375 mg a da I saw the patient in January 2020. The patient was doing well at that time we did not make any medication changes.     I interviewed the patient as well as a staff member by phone in September 2020.  The patient believes the meds were somewhat low and both he and the staff are requesting to continue with current treatment plan.  We did not make any changes at that visit.        I saw the patient in March 2021.Patient presents today for the purposes of medication management.  He was doing well at that time.  He had recently returned from a family trip to [place].  He was tolerating the medication.  We did not make any changes.    I saw the patient in August 2021.  He was doing well and he recently started a job at [employer].  His social isolation was improving.  His mood was good.  He was tolerating the medication and we did not make any medication changes at that visit.    The patient was seen in April 2022 and was doing well at that time.  He had recently left his job at [employer] and was going to start being a .  We did not make any medication changes.    The patient was seen for a follow-up on August 2, 2022.  He was doing extremely well at that time and tolerating the medication.  We did not make any medication changes.    The patient had a follow-up visit with me in November 2022.  I also spoke with the patient's staff and both reported things were going extremely well.  He was tolerating the medication and we elected to continue with the treatment plan and make no changes at that time.    The patient had  a follow-up appointment with me in February and was doing well and tolerating the medication.  No new medical issues or concerns and he was neurologically stable.  We elected to further decrease his Effexor down to 300 mg a day.    The patient was seen for follow-up in May 2023.  He was doing quite well at that time and asked about the possibility of a future decrease in the Effexor.  We elected at that time not to make any medication changes as the patient wanted to give it a couple more months.    HPI:  Patient presents today for the purposes of medication management.   The patient presents today again stating he is doing very well and he would like me to consider a decrease in the Effexor.  I also spoke with his caregiver who agreed that the patient was doing extremely well.  There was no significant depression.  No feelings of hopelessness or helplessness or guilt.  No hallucinations or delusions.  No tamiko.  No side effects of medication.  No abnormal movements.  The patient has no new medical issues or concerns.  We again discussed the differential diagnoses as well as treatment options and elected to decrease the Effexor back to 150 mg a day.        Current Medications: Please see chart. Medications personally reviewed.     There are no problems to display for this patient.    No past medical history on file.  No past surgical history on file.  No family history on file.  Current Outpatient Medications   Medication Sig Dispense Refill     Ferrous Sulfate 324 (65 Fe) MG TBEC        ibuprofen (ADVIL,MOTRIN) 200 MG tablet [IBUPROFEN (ADVIL,MOTRIN) 200 MG TABLET] Take 400 mg by mouth every 6 (six) hours as needed for pain.       levETIRAcetam (KEPPRA) 750 MG tablet [LEVETIRACETAM (KEPPRA) 750 MG TABLET] Take 3,000 mg by mouth daily.       traZODone (DESYREL) 50 MG tablet Take 1 tablet (50 mg) by mouth At Bedtime 30 tablet 3     venlafaxine (EFFEXOR XR) 150 MG 24 hr capsule TAKE 2 CAPSULES (300MG) BY MOUTH ONCE  DAILY Strength: 150 mg 62 capsule 11       Allergies   Allergen Reactions     Tegretol [Carbamazepine] Unknown     Social History     Socioeconomic History     Marital status: Single     Spouse name: Not on file     Number of children: Not on file     Years of education: Not on file     Highest education level: Not on file   Occupational History     Not on file   Tobacco Use     Smoking status: Never     Smokeless tobacco: Never   Substance and Sexual Activity     Alcohol use: Not on file     Drug use: Not on file     Sexual activity: Not on file   Other Topics Concern     Not on file   Social History Narrative     Not on file     Social Determinants of Health     Financial Resource Strain: Not on file   Food Insecurity: Not on file   Transportation Needs: Not on file   Physical Activity: Not on file   Stress: Not on file   Social Connections: Not on file   Intimate Partner Violence: Not on file   Housing Stability: Not on file       The following portions of the patient's history were reviewed and updated as appropriate: allergies, current medications, past family history, past medical history, past social history, past surgical history and problem list.    Review of Systems  A comprehensive review of systems was negative except for what is noted above    Mental Status Exam  Appearance:  The patient was alert, comfortable and calm. No agitation. Not currently in any pain. No evidence of any shortness of breath.  Behavior:  The patient is calm, cooperative, with no agitation or obvious distress. The patient does participate. No restlessness. No reports of any recent behavioral dyscontrol.  Speech:  Sentence structure is intact. Able to dialogue. Answers are consistent and appropriate. Not pressured. Not rambling.  Mood/Affect:  Patient appears alert. No obvious depression or anxiety. No irritability. No lability.  Thought Content:  No evidence of any psychosis. No reports of any recent psychosis.  Suicidal or  Homicidal Thoughts:  None apparent or reported. The patient denies any suicidal or homicidal ideation.   Thought Process/Formulation:  Able to track and follow. No racing thoughts. Not disorganized. Able to participate.  Associations:  Grossly intact. Able to process information.  Fund of Knowledge:  Grossly intact.   Attention/Concentration:  Attentive. Able to track and follow. Concentration appears grossly intact. Not disorganized.  Insight:  Grossly intact.  No reports of any difficulties.  Judgement:  Grossly intact.No reports of any difficulties.  Memory:  Grossly intact.   Motor Status:  No recent change reported. No current tremor.  Orientation: Grossly oriented.       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     Plan:  Medication Adjustment:  I am going to decrease the Effexor to 150 mg a day.  Risks and benefits were discussed.    Other:   Patient will return to clinic in  2 months.  He agrees to call or return sooner with any questions or concerns.  Risks and benefits were discussed.      Continue with the support of the clinic, reassurance, and redirection. Staff monitoring and ongoing assessments per team plan. Current psychotropic medication appears to represent the minimum effective dosage and appears medically necessary. We will continue to monitor and reassess. This team will utilize appropriate emergency services if necessary. I will make myself available if concerns or problems arise.    Mode of Communication: Call via [employer]      Total time for chart review, patient and staff interview, documentation and coordination of care is 25 minutes.    Patient Instructions   It was nice speaking with you today for our office video visit. The following is a summary of our visit.    General Information:    If lab work was done today as part of your evaluation you will generally be contacted via My  Chart, mail, or phone with the results within 1-5 days. If there is an alarming result we will contact you by phone. Lab results come back at varying times, I generally wait until all labs are resulted before making comments on results. Please note labs are automatically released to My Chart once available.     If you need refills please contact your pharmacist. They will send a refill request to me to review. Please allow 3 business days for us to process all refill requests.     Please call or send a medical message through My Chart, with any questions or concerns.    If you need any paperwork completed please fax forms to [phone]. Please state if you would like a copy of the completed paperwork, mailed or faxed back to the patient and a fax number to fax the paperwork to. Please allow up to 10 business days for paperwork to be completed.    Ren Manuel MD      Again, thank you for allowing me to participate in the care of your patient.        Sincerely,        Ren Manuel MD

## 2023-08-01 NOTE — NURSING NOTE
Is the patient currently in the state of [place]? YES    Visit mode:TELEPHONE    If the visit is dropped, the patient can be reconnected by: TELEPHONE VISIT: Phone number: [phone]    Will anyone else be joining the visit? NO      How would you like to obtain your AVS? MyChart    Are changes needed to the allergy or medication list? NO    Reason for visit: Follow Up

## 2023-08-01 NOTE — PATIENT INSTRUCTIONS
The patient is doing well and tolerating the medication.  He would like a reduction in the Effexor and we discussed the risks and benefits.  I decreased the Effexor down to 150 mg a day.  He should return to this clinic in 2 months for medication check.

## 2023-10-15 ENCOUNTER — HEALTH MAINTENANCE LETTER (OUTPATIENT)
Age: 33
End: 2023-10-15

## 2023-11-16 ENCOUNTER — VIRTUAL VISIT (OUTPATIENT)
Dept: NEUROLOGY | Facility: CLINIC | Age: 33
End: 2023-11-16
Payer: MEDICARE

## 2023-11-16 DIAGNOSIS — R41.89 NEUROCOGNITIVE DEFICITS: ICD-10-CM

## 2023-11-16 DIAGNOSIS — R29.818 NEUROCOGNITIVE DEFICITS: ICD-10-CM

## 2023-11-16 PROCEDURE — 99442 PR PHYSICIAN TELEPHONE EVALUATION 11-20 MIN: CPT | Mod: 95 | Performed by: PSYCHIATRY & NEUROLOGY

## 2023-11-16 RX ORDER — TRAZODONE HYDROCHLORIDE 50 MG/1
50 TABLET, FILM COATED ORAL AT BEDTIME
Qty: 30 TABLET | Refills: 3 | Status: SHIPPED | OUTPATIENT
Start: 2023-11-16 | End: 2024-02-01

## 2023-11-16 RX ORDER — VENLAFAXINE HYDROCHLORIDE 150 MG/1
150 TABLET, EXTENDED RELEASE ORAL DAILY
Qty: 30 TABLET | Refills: 4 | Status: SHIPPED | OUTPATIENT
Start: 2023-11-16 | End: 2024-02-01

## 2023-11-16 NOTE — PATIENT INSTRUCTIONS
The patient is doing quite well and has tolerated the decrease in the Effexor.  No new medical issues.  No side effects.  I will make no changes and the patient will return to this clinic in 3 months for medication check.

## 2023-11-16 NOTE — NURSING NOTE
Is the patient currently in the state of [place]? YES    Visit mode:TELEPHONE    If the visit is dropped, the patient can be reconnected by: TELEPHONE VISIT: Phone number:   Telephone Information:   Mobile [phone]       Will anyone else be joining the visit? No  (If patient encounters technical issues they should call [phone])    How would you like to obtain your AVS? MyChart    Are changes needed to the allergy or medication list? No    Rooming Documentation: Assigned questionnaire(s) completed .    Reason for visit: RECHWALESKA Ma

## 2023-11-16 NOTE — LETTER
11/16/2023         RE: Nicholas Daugherty  [address]  [address]  [facility] [address]        Dear Colleague,    Thank you for referring your patient, Nicholas Daugherty, to the [facility]. Please see a copy of my visit note below.    Virtual Visit Details    Type of service:  Telephone Visit   Phone call duration: 16 minutes       Outpatient Follow up TBI Evaluation     Pertinent History:  The patient was referred to this clinic for further assessment and treatment . Patient first saw me January 2 of 2018.  The patient had been followed at that time by Dr. Nguyen in the primary care clinic.  The patient has a history of Werner's syndrome diagnosed at age 3.  He had been followed by pediatric neurology.  He been on multiple medications over the years.  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  This was to control seizures and it had a positive effect on that.  The patient has had motor dysfunction and required a tendon release procedure.  When I first saw the patient he was on a combination of Prozac, Keppra and trazodone but continued to struggle with depression.  At that visit we did taper off the Prozac and then started the patient on low-dose venlafaxine.       I also had the patient go for neuropsychological testing.  Please see that data in the chart.      In spring 2018 we did increase the patient's Effexor XL.  In May 2018 we increased the Effexor further to 225 mg a day.     I saw the patient in September 2018 and at that time we did not make any medication changes.     I saw the patient in November 2019.  At that time he was complaining of lower motivation and some difficulty with sleep and poor sleep hygiene.  There were some more situational stressors including the death of his girlfriends mother.  We did did increase the patient's Effexor to 300 mg a day.     I saw the patient in April 2019.  He was doing well at that time.  The increase in  Effexor had been helpful.  We did not make any medication changes at that time.     I saw the patient in October 2019.  He had been furloughed from work and was looking for another job.  He was a bit more depressed and frustrated.  His grandmother was dying with dementia which was difficult for him.  His brother was being deployed to [place].  We did increase his Effexor at that time to 375 mg a da I saw the patient in January 2020. The patient was doing well at that time we did not make any medication changes.     I interviewed the patient as well as a staff member by phone in September 2020.  The patient believes the meds were somewhat low and both he and the staff are requesting to continue with current treatment plan.  We did not make any changes at that visit.        I saw the patient in March 2021.Patient presents today for the purposes of medication management.  He was doing well at that time.  He had recently returned from a family trip to [place].  He was tolerating the medication.  We did not make any changes.    I saw the patient in August 2021.  He was doing well and he recently started a job at the [employer].  His social isolation was improving.  His mood was good.  He was tolerating the medication and we did not make any medication changes at that visit.    The patient was seen in April 2022 and was doing well at that time.  He had recently left his job at [employer] and was going to start being a .  We did not make any medication changes.    The patient was seen for a follow-up on August 2, 2022.  He was doing extremely well at that time and tolerating the medication.  We did not make any medication changes.    The patient had a follow-up visit with me in November 2022.  I also spoke with the patient's staff and both reported things were going extremely well.  He was tolerating the medication and we elected to continue with the treatment plan and make no changes at that time.    The  patient had a follow-up appointment with me in February and was doing well and tolerating the medication.  No new medical issues or concerns and he was neurologically stable.  We elected to further decrease his Effexor down to 300 mg a day.    The patient was seen for follow-up in May 2023.  He was doing quite well at that time and asked about the possibility of a future decrease in the Effexor.  We elected at that time not to make any medication changes as the patient wanted to give it a couple more months.    The patient was seen in August 2023.  He was doing extremely well at that time and tolerating the medication.  We requested consideration for a decrease in his medication and we elected to decrease his Effexor to 150 mg a day.    HPI:  Patient presents today for the purposes of medication management.   The patient presents today stating he is doing extremely well.  No difficulty with mood.  No depression.  No tamiko.  No psychosis.  No behavioral health issues.  He is sleeping well and eating well.  He states concentration is good.  He denies having any tremors or side effects to the medication.  No new medical issues or concerns.  He would like to continue with the current treatment plan.        Current Medications: Please see chart. Medications personally reviewed.     There are no problems to display for this patient.    No past medical history on file.  No past surgical history on file.  No family history on file.  Current Outpatient Medications   Medication Sig Dispense Refill     Ferrous Sulfate 324 (65 Fe) MG TBEC        levETIRAcetam (KEPPRA) 750 MG tablet [LEVETIRACETAM (KEPPRA) 750 MG TABLET] Take 3,000 mg by mouth daily.       traZODone (DESYREL) 50 MG tablet Take 1 tablet (50 mg) by mouth At Bedtime 30 tablet 3     venlafaxine (EFFEXOR-ER) 150 MG 24 hr tablet Take 1 tablet (150 mg) by mouth daily 30 tablet 4     ibuprofen (ADVIL,MOTRIN) 200 MG tablet [IBUPROFEN (ADVIL,MOTRIN) 200 MG TABLET] Take  400 mg by mouth every 6 (six) hours as needed for pain.         Allergies   Allergen Reactions     Tegretol [Carbamazepine] Unknown     Social History     Socioeconomic History     Marital status: Single     Spouse name: Not on file     Number of children: Not on file     Years of education: Not on file     Highest education level: Not on file   Occupational History     Not on file   Tobacco Use     Smoking status: Never     Smokeless tobacco: Never   Substance and Sexual Activity     Alcohol use: Not on file     Drug use: Not on file     Sexual activity: Not on file   Other Topics Concern     Not on file   Social History Narrative     Not on file     Social Determinants of Health     Financial Resource Strain: Not on file   Food Insecurity: Not on file   Transportation Needs: Not on file   Physical Activity: Not on file   Stress: Not on file   Social Connections: Not on file   Interpersonal Safety: Not on file   Housing Stability: Not on file       The following portions of the patient's history were reviewed and updated as appropriate: allergies, current medications, past family history, past medical history, past social history, past surgical history and problem list.    Review of Systems  A comprehensive review of systems was negative except for what is noted above    Mental Status Exam  Appearance: Patient does not appear uncomfortable.  There is no obvious pain or shortness of breath.  No new issues or concerns are reported.   Behavior: Patient is cooperative. Able to interact appropriately.  No agitation.   No reports of any recent behavioral dyscontrol.  The patient does participate and is cooperative.  Able to initiate.  Speech: Participating with intact sentence structure.  No pressured or rambling quality.  Answers are appropriate and consistent.  Able to dialogue and initiate.  Mood/Affect: No obvious depression or anxiety.  No irritability or lability.  No evidence of any tamiko.  Thought Content:  No  evidence of any psychosis. The patient denies any psychotic symptoms. No reports of any recent psychosis.  Suicidal or Homicidal Thoughts: None apparent or reported.  The patient denies having any homicidal or suicidal ideation.  Thought Process/Formulation: Participating.  Able to follow conversation.  No thought blocking.  No racing thoughts.  Not disorganized.  No evidence of any pressured thinking.  Associations:  Grossly intact.  Processing information appropriately.  Able to track conversation..  Fund of Knowledge:  Grossly intact.   Attention/Concentration: Concentration appears adequate.  The patient is following and participating well.  Attentive.  No disorganization.  No racing thoughts.  Insight:  Grossly intact.   Judgement:  Grossly intact.   Memory:  Grossly intact.   Motor Status:  No recent change reported. No current tremor.  Orientation: Grossly oriented..       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     Plan:  Medication Adjustment:  I am going to continue with the Effexor at 150 mg a day.    Other:   Patient will return to clinic in  2 months.  He agrees to call or return sooner with any questions or concerns.  Risks and benefits were discussed.      Continue with the support of the clinic, reassurance, and redirection. Staff monitoring and ongoing assessments per team plan. Current psychotropic medication appears to represent the minimum effective dosage and appears medically necessary. We will continue to monitor and reassess. This team will utilize appropriate emergency services if necessary. I will make myself available if concerns or problems arise.    Mode of Communication: Call via [employer]      Patient Instructions   It was nice speaking with you today for our office video visit. The following is a summary of our visit.    General Information:    If lab work was done today as  part of your evaluation you will generally be contacted via My Chart, mail, or phone with the results within 1-5 days. If there is an alarming result we will contact you by phone. Lab results come back at varying times, I generally wait until all labs are resulted before making comments on results. Please note labs are automatically released to My Chart once available.     If you need refills please contact your pharmacist. They will send a refill request to me to review. Please allow 3 business days for us to process all refill requests.     Please call or send a medical message through My Chart, with any questions or concerns.    If you need any paperwork completed please fax forms to [phone]. Please state if you would like a copy of the completed paperwork, mailed or faxed back to the patient and a fax number to fax the paperwork to. Please allow up to 10 business days for paperwork to be completed.    Ren Manuel MD      Again, thank you for allowing me to participate in the care of your patient.        Sincerely,        Ren Manuel MD

## 2023-11-16 NOTE — PROGRESS NOTES
Virtual Visit Details    Type of service:  Telephone Visit   Phone call duration: 16 minutes       Outpatient Follow up TBI Evaluation     Pertinent History:  The patient was referred to this clinic for further assessment and treatment . Patient first saw me January 2 of 2018.  The patient had been followed at that time by Dr. Nguyen in the primary care clinic.  The patient has a history of Werner's syndrome diagnosed at age 3.  He had been followed by pediatric neurology.  He been on multiple medications over the years.  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  This was to control seizures and it had a positive effect on that.  The patient has had motor dysfunction and required a tendon release procedure.  When I first saw the patient he was on a combination of Prozac, Keppra and trazodone but continued to struggle with depression.  At that visit we did taper off the Prozac and then started the patient on low-dose venlafaxine.       I also had the patient go for neuropsychological testing.  Please see that data in the chart.      In spring 2018 we did increase the patient's Effexor XL.  In May 2018 we increased the Effexor further to 225 mg a day.     I saw the patient in September 2018 and at that time we did not make any medication changes.     I saw the patient in November 2019.  At that time he was complaining of lower motivation and some difficulty with sleep and poor sleep hygiene.  There were some more situational stressors including the death of his girlfriends mother.  We did did increase the patient's Effexor to 300 mg a day.     I saw the patient in April 2019.  He was doing well at that time.  The increase in Effexor had been helpful.  We did not make any medication changes at that time.     I saw the patient in October 2019.  He had been furloughed from work and was looking for another job.  He was a bit more depressed and frustrated.  His grandmother was dying with dementia  which was difficult for him.  His brother was being deployed to [place].  We did increase his Effexor at that time to 375 mg a da I saw the patient in January 2020. The patient was doing well at that time we did not make any medication changes.     I interviewed the patient as well as a staff member by phone in September 2020.  The patient believes the meds were somewhat low and both he and the staff are requesting to continue with current treatment plan.  We did not make any changes at that visit.        I saw the patient in March 2021.Patient presents today for the purposes of medication management.  He was doing well at that time.  He had recently returned from a family trip to [place].  He was tolerating the medication.  We did not make any changes.    I saw the patient in August 2021.  He was doing well and he recently started a job at [employer].  His social isolation was improving.  His mood was good.  He was tolerating the medication and we did not make any medication changes at that visit.    The patient was seen in April 2022 and was doing well at that time.  He had recently left his job at [employer] and was going to start being a .  We did not make any medication changes.    The patient was seen for a follow-up on August 2, 2022.  He was doing extremely well at that time and tolerating the medication.  We did not make any medication changes.    The patient had a follow-up visit with me in November 2022.  I also spoke with the patient's staff and both reported things were going extremely well.  He was tolerating the medication and we elected to continue with the treatment plan and make no changes at that time.    The patient had a follow-up appointment with me in February and was doing well and tolerating the medication.  No new medical issues or concerns and he was neurologically stable.  We elected to further decrease his Effexor down to 300 mg a day.    The patient was seen for  follow-up in May 2023.  He was doing quite well at that time and asked about the possibility of a future decrease in the Effexor.  We elected at that time not to make any medication changes as the patient wanted to give it a couple more months.    The patient was seen in August 2023.  He was doing extremely well at that time and tolerating the medication.  We requested consideration for a decrease in his medication and we elected to decrease his Effexor to 150 mg a day.    HPI:  Patient presents today for the purposes of medication management.   The patient presents today stating he is doing extremely well.  No difficulty with mood.  No depression.  No tamiko.  No psychosis.  No behavioral health issues.  He is sleeping well and eating well.  He states concentration is good.  He denies having any tremors or side effects to the medication.  No new medical issues or concerns.  He would like to continue with the current treatment plan.        Current Medications: Please see chart. Medications personally reviewed.     There are no problems to display for this patient.    No past medical history on file.  No past surgical history on file.  No family history on file.  Current Outpatient Medications   Medication Sig Dispense Refill    Ferrous Sulfate 324 (65 Fe) MG TBEC       levETIRAcetam (KEPPRA) 750 MG tablet [LEVETIRACETAM (KEPPRA) 750 MG TABLET] Take 3,000 mg by mouth daily.      traZODone (DESYREL) 50 MG tablet Take 1 tablet (50 mg) by mouth At Bedtime 30 tablet 3    venlafaxine (EFFEXOR-ER) 150 MG 24 hr tablet Take 1 tablet (150 mg) by mouth daily 30 tablet 4    ibuprofen (ADVIL,MOTRIN) 200 MG tablet [IBUPROFEN (ADVIL,MOTRIN) 200 MG TABLET] Take 400 mg by mouth every 6 (six) hours as needed for pain.         Allergies   Allergen Reactions    Tegretol [Carbamazepine] Unknown     Social History     Socioeconomic History    Marital status: Single     Spouse name: Not on file    Number of children: Not on file    Years  of education: Not on file    Highest education level: Not on file   Occupational History    Not on file   Tobacco Use    Smoking status: Never    Smokeless tobacco: Never   Substance and Sexual Activity    Alcohol use: Not on file    Drug use: Not on file    Sexual activity: Not on file   Other Topics Concern    Not on file   Social History Narrative    Not on file     Social Determinants of Health     Financial Resource Strain: Not on file   Food Insecurity: Not on file   Transportation Needs: Not on file   Physical Activity: Not on file   Stress: Not on file   Social Connections: Not on file   Interpersonal Safety: Not on file   Housing Stability: Not on file       The following portions of the patient's history were reviewed and updated as appropriate: allergies, current medications, past family history, past medical history, past social history, past surgical history and problem list.    Review of Systems  A comprehensive review of systems was negative except for what is noted above    Mental Status Exam  Appearance: Patient does not appear uncomfortable.  There is no obvious pain or shortness of breath.  No new issues or concerns are reported.   Behavior: Patient is cooperative. Able to interact appropriately.  No agitation.   No reports of any recent behavioral dyscontrol.  The patient does participate and is cooperative.  Able to initiate.  Speech: Participating with intact sentence structure.  No pressured or rambling quality.  Answers are appropriate and consistent.  Able to dialogue and initiate.  Mood/Affect: No obvious depression or anxiety.  No irritability or lability.  No evidence of any tamiko.  Thought Content:  No evidence of any psychosis. The patient denies any psychotic symptoms. No reports of any recent psychosis.  Suicidal or Homicidal Thoughts: None apparent or reported.  The patient denies having any homicidal or suicidal ideation.  Thought Process/Formulation: Participating.  Able to follow  conversation.  No thought blocking.  No racing thoughts.  Not disorganized.  No evidence of any pressured thinking.  Associations:  Grossly intact.  Processing information appropriately.  Able to track conversation..  Fund of Knowledge:  Grossly intact.   Attention/Concentration: Concentration appears adequate.  The patient is following and participating well.  Attentive.  No disorganization.  No racing thoughts.  Insight:  Grossly intact.   Judgement:  Grossly intact.   Memory:  Grossly intact.   Motor Status:  No recent change reported. No current tremor.  Orientation: Grossly oriented..       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     Plan:  Medication Adjustment:  I am going to continue with the Effexor at 150 mg a day.    Other:   Patient will return to clinic in  2 months.  He agrees to call or return sooner with any questions or concerns.  Risks and benefits were discussed.      Continue with the support of the clinic, reassurance, and redirection. Staff monitoring and ongoing assessments per team plan. Current psychotropic medication appears to represent the minimum effective dosage and appears medically necessary. We will continue to monitor and reassess. This team will utilize appropriate emergency services if necessary. I will make myself available if concerns or problems arise.    Mode of Communication: Call via [employer]      Patient Instructions   It was nice speaking with you today for our office video visit. The following is a summary of our visit.    General Information:    If lab work was done today as part of your evaluation you will generally be contacted via My Chart, mail, or phone with the results within 1-5 days. If there is an alarming result we will contact you by phone. Lab results come back at varying times, I generally wait until all labs are resulted before making comments on  results. Please note labs are automatically released to My Chart once available.     If you need refills please contact your pharmacist. They will send a refill request to me to review. Please allow 3 business days for us to process all refill requests.     Please call or send a medical message through My Chart, with any questions or concerns.    If you need any paperwork completed please fax forms to [phone]. Please state if you would like a copy of the completed paperwork, mailed or faxed back to the patient and a fax number to fax the paperwork to. Please allow up to 10 business days for paperwork to be completed.    Ren Manuel MD

## 2024-02-01 ENCOUNTER — TELEPHONE (OUTPATIENT)
Dept: NEUROLOGY | Facility: CLINIC | Age: 34
End: 2024-02-01

## 2024-02-01 ENCOUNTER — VIRTUAL VISIT (OUTPATIENT)
Dept: NEUROLOGY | Facility: CLINIC | Age: 34
End: 2024-02-01
Payer: MEDICARE

## 2024-02-01 VITALS — HEIGHT: 75 IN | BODY MASS INDEX: 23.87 KG/M2 | WEIGHT: 192 LBS

## 2024-02-01 DIAGNOSIS — R29.818 NEUROCOGNITIVE DEFICITS: ICD-10-CM

## 2024-02-01 DIAGNOSIS — R41.89 NEUROCOGNITIVE DEFICITS: ICD-10-CM

## 2024-02-01 PROCEDURE — 99213 OFFICE O/P EST LOW 20 MIN: CPT | Mod: 95 | Performed by: PSYCHIATRY & NEUROLOGY

## 2024-02-01 RX ORDER — TRAZODONE HYDROCHLORIDE 50 MG/1
50 TABLET, FILM COATED ORAL AT BEDTIME
Qty: 30 TABLET | Refills: 3 | Status: SHIPPED | OUTPATIENT
Start: 2024-02-01 | End: 2024-05-23

## 2024-02-01 RX ORDER — VENLAFAXINE HYDROCHLORIDE 150 MG/1
150 TABLET, EXTENDED RELEASE ORAL DAILY
Qty: 30 TABLET | Refills: 4 | Status: SHIPPED | OUTPATIENT
Start: 2024-02-01 | End: 2024-05-23

## 2024-02-01 ASSESSMENT — PAIN SCALES - GENERAL: PAINLEVEL: NO PAIN (0)

## 2024-02-01 NOTE — PROGRESS NOTES
Virtual Visit Details    Type of service:  Video Visit   Video Start Time: 12:20 PM  Video End Time: 12:29 PM    Originating Location (pt. Location): The patient's home  Distant Location (provider location): My office  Platform used for Video Visit: [employer]      Outpatient Follow up TBI Evaluation     Pertinent History:  The patient was referred to this clinic for further assessment and treatment . Patient first saw me January 2 of 2018.  The patient had been followed at that time by Dr. Nguyen in the primary care clinic.  The patient has a history of Werner's syndrome diagnosed at age 3.  He had been followed by pediatric neurology.  He been on multiple medications over the years.  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  This was to control seizures and it had a positive effect on that.  The patient has had motor dysfunction and required a tendon release procedure.  When I first saw the patient he was on a combination of Prozac, Keppra and trazodone but continued to struggle with depression.  At that visit we did taper off the Prozac and then started the patient on low-dose venlafaxine.       I also had the patient go for neuropsychological testing.  Please see that data in the chart.      In spring 2018 we did increase the patient's Effexor XL.  In May 2018 we increased the Effexor further to 225 mg a day.     I saw the patient in September 2018 and at that time we did not make any medication changes.     I saw the patient in November 2019.  At that time he was complaining of lower motivation and some difficulty with sleep and poor sleep hygiene.  There were some more situational stressors including the death of his girlfriends mother.  We did did increase the patient's Effexor to 300 mg a day.     I saw the patient in April 2019.  He was doing well at that time.  The increase in Effexor had been helpful.  We did not make any medication changes at that time.     I saw the patient in  October 2019.  He had been furloughed from work and was looking for another job.  He was a bit more depressed and frustrated.  His grandmother was dying with dementia which was difficult for him.  His brother was being deployed to [place].  We did increase his Effexor at that time to 375 mg a da I saw the patient in January 2020. The patient was doing well at that time we did not make any medication changes.     I interviewed the patient as well as a staff member by phone in September 2020.  The patient believes the meds were somewhat low and both he and the staff are requesting to continue with current treatment plan.  We did not make any changes at that visit.        I saw the patient in March 2021.Patient presents today for the purposes of medication management.  He was doing well at that time.  He had recently returned from a family trip to [place].  He was tolerating the medication.  We did not make any changes.    I saw the patient in August 2021.  He was doing well and he recently started a job at the [employer].  His social isolation was improving.  His mood was good.  He was tolerating the medication and we did not make any medication changes at that visit.    The patient was seen in April 2022 and was doing well at that time.  He had recently left his job at [employer] and was going to start being a .  We did not make any medication changes.    The patient was seen for a follow-up on August 2, 2022.  He was doing extremely well at that time and tolerating the medication.  We did not make any medication changes.    The patient had a follow-up visit with me in November 2022.  I also spoke with the patient's staff and both reported things were going extremely well.  He was tolerating the medication and we elected to continue with the treatment plan and make no changes at that time.    The patient had a follow-up appointment with me in February and was doing well and tolerating the medication.  No  new medical issues or concerns and he was neurologically stable.  We elected to further decrease his Effexor down to 300 mg a day.    The patient was seen for follow-up in May 2023.  He was doing quite well at that time and asked about the possibility of a future decrease in the Effexor.  We elected at that time not to make any medication changes as the patient wanted to give it a couple more months.    The patient was seen in August 2023.  He was doing extremely well at that time and tolerating the medication.  We requested consideration for a decrease in his medication and we elected to decrease his Effexor to 150 mg a day.    The patient was seen for follow-up in November 2023 and was doing extremely well at that time.  He was tolerating the medication.  We elected to continue with the treatment plan and did not make any changes.    HPI:  Patient presents today for the purposes of medication management.   The patient presents today stating he is doing very well.  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  He was a  with children.  He denied that there were any behavioral or boundary problems there he stated it was because of his vision.  He stated he was able to get another job at [employer] at the [employer] and will be starting that on Monday.  He reports he believes it was a good decision and it was a mutual decision to leave his old job between the employer and him and there were no conflicts or ill feelings.  The patient denies feeling hopeless or helpless or worthless thoughts of hurting himself or anybody else.  No hallucinations or delusions.  No tamiko.  The patient reports he is sleeping well and eating well.  No change in cognition or concentration.  He reports no new medical issues.  No tremors or side effects.  He believes the meds have been helpful and he wants to continue with the treatment plan.        Current Medications: Please see chart. Medications  personally reviewed.     There are no problems to display for this patient.    No past medical history on file.  No past surgical history on file.  No family history on file.  Current Outpatient Medications   Medication Sig Dispense Refill    Ferrous Sulfate 324 (65 Fe) MG TBEC       ibuprofen (ADVIL,MOTRIN) 200 MG tablet [IBUPROFEN (ADVIL,MOTRIN) 200 MG TABLET] Take 400 mg by mouth every 6 (six) hours as needed for pain.      levETIRAcetam (KEPPRA) 750 MG tablet [LEVETIRACETAM (KEPPRA) 750 MG TABLET] Take 3,000 mg by mouth daily.      traZODone (DESYREL) 50 MG tablet Take 1 tablet (50 mg) by mouth at bedtime 30 tablet 3    venlafaxine (EFFEXOR-ER) 150 MG 24 hr tablet Take 1 tablet (150 mg) by mouth daily 30 tablet 4       Allergies   Allergen Reactions    Tegretol [Carbamazepine] Unknown     Social History     Socioeconomic History    Marital status: Single     Spouse name: Not on file    Number of children: Not on file    Years of education: Not on file    Highest education level: Not on file   Occupational History    Not on file   Tobacco Use    Smoking status: Never    Smokeless tobacco: Never   Substance and Sexual Activity    Alcohol use: Not on file    Drug use: Not on file    Sexual activity: Not on file   Other Topics Concern    Not on file   Social History Narrative    Not on file     Social Determinants of Health     Financial Resource Strain: Not on file   Food Insecurity: Not on file   Transportation Needs: Not on file   Physical Activity: Not on file   Stress: Not on file   Social Connections: Not on file   Interpersonal Safety: Not on file   Housing Stability: Not on file       The following portions of the patient's history were reviewed and updated as appropriate: allergies, current medications, past family history, past medical history, past social history, past surgical history and problem list.    Review of Systems  A comprehensive review of systems was negative except for what is noted  above    Mental Status Exam  Appearance: The patient does not appear to be in any distress.  There is no obvious pain.  No shortness of breath.  Patient is able to attend and appears appropriate.  Behavior: No reports of any significant behavioral dyscontrol.  Patient is alert and comfortable.  Participating fairly well.  No current restlessness or agitation.  Speech: Patient is participating appropriately.  Communication appears grossly intact.  Not pressured or rambling.  Answers are appropriate.  There is consistency.  The patient is able to initiate well and is able to dialogue.  No significant delays.  Mood/Affect: Without any significant deficits.  No significant depression or anxiety.  No irritability.  No lability.  No evidence of any tamiko.  Thought Content:  No evidence of any psychosis. The patient denies any psychotic symptoms. No reports of any recent psychosis.  Suicidal or Homicidal Thoughts: No reports of any suicidal or homicidal ideation.  None apparent on exam.  Thought Process/Formulation: Able to track and follow appropriately.  No thought blocking.  Thoughts are not racing.  No significant delays.  Not significantly disorganized.  Associations:  Grossly intact.  Processing information appropriately.  Able to track conversation..  Fund of Knowledge:  Grossly adequate.    Attention/Concentration: Concentration appears adequate.  Attention appears adequate.  The patient is tracking and following.  Not disorganized.  No racing thoughts.  Insight:  Grossly intact.   Judgement:  Grossly intact.   Memory:  Grossly intact.   Motor Status:  No recent change reported. No current tremor.  Orientation: Grossly oriented...       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     Plan:  Medication Adjustment:  I will make no changes at this time.    Other:   Patient will return to clinic in 3-4 months.  He  agrees to call or return sooner with any questions or concerns.  Risks and benefits were discussed.      Continue with the support of the clinic, reassurance, and redirection. Staff monitoring and ongoing assessments per team plan. Current psychotropic medication appears to represent the minimum effective dosage and appears medically necessary. We will continue to monitor and reassess. This team will utilize appropriate emergency services if necessary. I will make myself available if concerns or problems arise.        Patient Instructions   It was nice speaking with you today for our office video visit. The following is a summary of our visit.    General Information:    If lab work was done today as part of your evaluation you will generally be contacted via My Chart, mail, or phone with the results within 1-5 days. If there is an alarming result we will contact you by phone. Lab results come back at varying times, I generally wait until all labs are resulted before making comments on results. Please note labs are automatically released to My Chart once available.     If you need refills please contact your pharmacist. They will send a refill request to me to review. Please allow 3 business days for us to process all refill requests.     Please call or send a medical message through My Chart, with any questions or concerns.    If you need any paperwork completed please fax forms to [phone]. Please state if you would like a copy of the completed paperwork, mailed or faxed back to the patient and a fax number to fax the paperwork to. Please allow up to 10 business days for paperwork to be completed.    Ren Manuel MD

## 2024-02-01 NOTE — TELEPHONE ENCOUNTER
[facility] Call Center    Phone Message    May a detailed message be left on voicemail: yes     Reason for Call: Medication Question or concern regarding medication   Prescription Clarification  Name of Medication: venlafaxine (EFFEXOR-ER) 150 MG 24 hr tablet [[id number]] (Order [id number])  Prescribing Provider: Ren Manuel MD   Pharmacy:   [facility], [address] (Pharmacy)     What on the order needs clarification? [place] from [facility] says the medication was ordered as tablets and the pt usually gets the capsules, just and ROD that the order will be changed to capsules.          Action Taken: Message routed to:  Other: [facility]    Travel Screening: Not Applicable

## 2024-02-01 NOTE — NURSING NOTE
Is the patient currently in the state of [place]? YES    Visit mode:VIDEO    If the visit is dropped, the patient can be reconnected by: VIDEO VISIT: Text to cell phone:   Telephone Information:   Mobile [phone]       Will anyone else be joining the visit? NO  (If patient encounters technical issues they should call [phone]    How would you like to obtain your AVS? MyChart    Are changes needed to the allergy or medication list? No    Reason for visit: Follow Up    Anisha GALEANO

## 2024-02-01 NOTE — LETTER
2/1/2024         RE: Nicholas Daugherty  [address]  [facility] [address]        Dear Colleague,    Thank you for referring your patient, Nicholas Daugherty, to the [facility]. Please see a copy of my visit note below.    Virtual Visit Details    Type of service:  Video Visit   Video Start Time: 12:20 PM  Video End Time: 12:29 PM    Originating Location (pt. Location): The patient's home  Distant Location (provider location): My office  Platform used for Video Visit: [employer]      Outpatient Follow up TBI Evaluation     Pertinent History:  The patient was referred to this clinic for further assessment and treatment . Patient first saw me January 2 of 2018.  The patient had been followed at that time by Dr. Nguyen in the primary care clinic.  The patient has a history of Werner's syndrome diagnosed at age 3.  He had been followed by pediatric neurology.  He been on multiple medications over the years.  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  This was to control seizures and it had a positive effect on that.  The patient has had motor dysfunction and required a tendon release procedure.  When I first saw the patient he was on a combination of Prozac, Keppra and trazodone but continued to struggle with depression.  At that visit we did taper off the Prozac and then started the patient on low-dose venlafaxine.       I also had the patient go for neuropsychological testing.  Please see that data in the chart.      In spring 2018 we did increase the patient's Effexor XL.  In May 2018 we increased the Effexor further to 225 mg a day.     I saw the patient in September 2018 and at that time we did not make any medication changes.     I saw the patient in November 2019.  At that time he was complaining of lower motivation and some difficulty with sleep and poor sleep hygiene.  There were some more situational stressors including the death of his girlfriends  mother.  We did did increase the patient's Effexor to 300 mg a day.     I saw the patient in April 2019.  He was doing well at that time.  The increase in Effexor had been helpful.  We did not make any medication changes at that time.     I saw the patient in October 2019.  He had been furloughed from work and was looking for another job.  He was a bit more depressed and frustrated.  His grandmother was dying with dementia which was difficult for him.  His brother was being deployed to [place].  We did increase his Effexor at that time to 375 mg a da I saw the patient in January 2020. The patient was doing well at that time we did not make any medication changes.     I interviewed the patient as well as a staff member by phone in September 2020.  The patient believes the meds were somewhat low and both he and the staff are requesting to continue with current treatment plan.  We did not make any changes at that visit.        I saw the patient in March 2021.Patient presents today for the purposes of medication management.  He was doing well at that time.  He had recently returned from a family trip to [place].  He was tolerating the medication.  We did not make any changes.    I saw the patient in August 2021.  He was doing well and he recently started a job at the [employer].  His social isolation was improving.  His mood was good.  He was tolerating the medication and we did not make any medication changes at that visit.    The patient was seen in April 2022 and was doing well at that time.  He had recently left his job at [employer] and was going to start being a .  We did not make any medication changes.    The patient was seen for a follow-up on August 2, 2022.  He was doing extremely well at that time and tolerating the medication.  We did not make any medication changes.    The patient had a follow-up visit with me in November 2022.  I also spoke with the patient's staff and both reported things  were going extremely well.  He was tolerating the medication and we elected to continue with the treatment plan and make no changes at that time.    The patient had a follow-up appointment with me in February and was doing well and tolerating the medication.  No new medical issues or concerns and he was neurologically stable.  We elected to further decrease his Effexor down to 300 mg a day.    The patient was seen for follow-up in May 2023.  He was doing quite well at that time and asked about the possibility of a future decrease in the Effexor.  We elected at that time not to make any medication changes as the patient wanted to give it a couple more months.    The patient was seen in August 2023.  He was doing extremely well at that time and tolerating the medication.  We requested consideration for a decrease in his medication and we elected to decrease his Effexor to 150 mg a day.    The patient was seen for follow-up in November 2023 and was doing extremely well at that time.  He was tolerating the medication.  We elected to continue with the treatment plan and did not make any changes.    HPI:  Patient presents today for the purposes of medication management.   The patient presents today stating he is doing very well.  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  He was a  with children.  He denied that there were any behavioral or boundary problems there he stated it was because of his vision.  He stated he was able to get another job at [employer] the [employer] and will be starting that on Monday.  He reports he believes it was a good decision and it was a mutual decision to leave his old job between the employer and him and there were no conflicts or ill feelings.  The patient denies feeling hopeless or helpless or worthless thoughts of hurting himself or anybody else.  No hallucinations or delusions.  No tamiko.  The patient reports he is sleeping well and  eating well.  No change in cognition or concentration.  He reports no new medical issues.  No tremors or side effects.  He believes the meds have been helpful and he wants to continue with the treatment plan.        Current Medications: Please see chart. Medications personally reviewed.     There are no problems to display for this patient.    No past medical history on file.  No past surgical history on file.  No family history on file.  Current Outpatient Medications   Medication Sig Dispense Refill     Ferrous Sulfate 324 (65 Fe) MG TBEC        ibuprofen (ADVIL,MOTRIN) 200 MG tablet [IBUPROFEN (ADVIL,MOTRIN) 200 MG TABLET] Take 400 mg by mouth every 6 (six) hours as needed for pain.       levETIRAcetam (KEPPRA) 750 MG tablet [LEVETIRACETAM (KEPPRA) 750 MG TABLET] Take 3,000 mg by mouth daily.       traZODone (DESYREL) 50 MG tablet Take 1 tablet (50 mg) by mouth at bedtime 30 tablet 3     venlafaxine (EFFEXOR-ER) 150 MG 24 hr tablet Take 1 tablet (150 mg) by mouth daily 30 tablet 4       Allergies   Allergen Reactions     Tegretol [Carbamazepine] Unknown     Social History     Socioeconomic History     Marital status: Single     Spouse name: Not on file     Number of children: Not on file     Years of education: Not on file     Highest education level: Not on file   Occupational History     Not on file   Tobacco Use     Smoking status: Never     Smokeless tobacco: Never   Substance and Sexual Activity     Alcohol use: Not on file     Drug use: Not on file     Sexual activity: Not on file   Other Topics Concern     Not on file   Social History Narrative     Not on file     Social Determinants of Health     Financial Resource Strain: Not on file   Food Insecurity: Not on file   Transportation Needs: Not on file   Physical Activity: Not on file   Stress: Not on file   Social Connections: Not on file   Interpersonal Safety: Not on file   Housing Stability: Not on file       The following portions of the patient's  history were reviewed and updated as appropriate: allergies, current medications, past family history, past medical history, past social history, past surgical history and problem list.    Review of Systems  A comprehensive review of systems was negative except for what is noted above    Mental Status Exam  Appearance: The patient does not appear to be in any distress.  There is no obvious pain.  No shortness of breath.  Patient is able to attend and appears appropriate.  Behavior: No reports of any significant behavioral dyscontrol.  Patient is alert and comfortable.  Participating fairly well.  No current restlessness or agitation.  Speech: Patient is participating appropriately.  Communication appears grossly intact.  Not pressured or rambling.  Answers are appropriate.  There is consistency.  The patient is able to initiate well and is able to dialogue.  No significant delays.  Mood/Affect: Without any significant deficits.  No significant depression or anxiety.  No irritability.  No lability.  No evidence of any tamiko.  Thought Content:  No evidence of any psychosis. The patient denies any psychotic symptoms. No reports of any recent psychosis.  Suicidal or Homicidal Thoughts: No reports of any suicidal or homicidal ideation.  None apparent on exam.  Thought Process/Formulation: Able to track and follow appropriately.  No thought blocking.  Thoughts are not racing.  No significant delays.  Not significantly disorganized.  Associations:  Grossly intact.  Processing information appropriately.  Able to track conversation..  Fund of Knowledge:  Grossly adequate.    Attention/Concentration: Concentration appears adequate.  Attention appears adequate.  The patient is tracking and following.  Not disorganized.  No racing thoughts.  Insight:  Grossly intact.   Judgement:  Grossly intact.   Memory:  Grossly intact.   Motor Status:  No recent change reported. No current tremor.  Orientation: Grossly oriented...        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     Plan:  Medication Adjustment:  I will make no changes at this time.    Other:   Patient will return to clinic in 3-4 months.  He agrees to call or return sooner with any questions or concerns.  Risks and benefits were discussed.      Continue with the support of the clinic, reassurance, and redirection. Staff monitoring and ongoing assessments per team plan. Current psychotropic medication appears to represent the minimum effective dosage and appears medically necessary. We will continue to monitor and reassess. This team will utilize appropriate emergency services if necessary. I will make myself available if concerns or problems arise.        Patient Instructions   It was nice speaking with you today for our office video visit. The following is a summary of our visit.    General Information:    If lab work was done today as part of your evaluation you will generally be contacted via My Chart, mail, or phone with the results within 1-5 days. If there is an alarming result we will contact you by phone. Lab results come back at varying times, I generally wait until all labs are resulted before making comments on results. Please note labs are automatically released to My Chart once available.     If you need refills please contact your pharmacist. They will send a refill request to me to review. Please allow 3 business days for us to process all refill requests.     Please call or send a medical message through My Chart, with any questions or concerns.    If you need any paperwork completed please fax forms to [phone]. Please state if you would like a copy of the completed paperwork, mailed or faxed back to the patient and a fax number to fax the paperwork to. Please allow up to 10 business days for paperwork to be completed.    Ren Manuel,  MD      Again, thank you for allowing me to participate in the care of your patient.        Sincerely,        Ren Maunel MD

## 2024-05-23 ENCOUNTER — VIRTUAL VISIT (OUTPATIENT)
Dept: NEUROLOGY | Facility: CLINIC | Age: 34
End: 2024-05-23
Payer: MEDICARE

## 2024-05-23 DIAGNOSIS — R41.89 NEUROCOGNITIVE DEFICITS: ICD-10-CM

## 2024-05-23 DIAGNOSIS — R29.818 NEUROCOGNITIVE DEFICITS: ICD-10-CM

## 2024-05-23 PROCEDURE — 99214 OFFICE O/P EST MOD 30 MIN: CPT | Mod: 95 | Performed by: PSYCHIATRY & NEUROLOGY

## 2024-05-23 RX ORDER — TRAZODONE HYDROCHLORIDE 50 MG/1
50 TABLET, FILM COATED ORAL AT BEDTIME
Qty: 30 TABLET | Refills: 3 | Status: SHIPPED | OUTPATIENT
Start: 2024-05-23 | End: 2024-07-11

## 2024-05-23 RX ORDER — VENLAFAXINE HYDROCHLORIDE 150 MG/1
150 TABLET, EXTENDED RELEASE ORAL DAILY
Qty: 30 TABLET | Refills: 4 | Status: SHIPPED | OUTPATIENT
Start: 2024-05-23 | End: 2024-07-11

## 2024-05-23 NOTE — PROGRESS NOTES
Virtual Visit Details    Type of service:  Video Visit   Video Start Time: 12:54 PM  Video End Time: 1:08 PM    Originating Location (pt. Location): Patient's home  Distant Location (provider location): My home office  Platform used for Video Visit: [employer]        Outpatient Follow up TBI Evaluation     Pertinent History:  The patient was referred to this clinic for further assessment and treatment . Patient first saw me January 2 of 2018.  The patient had been followed at that time by Dr. Nguyen in the primary care clinic.  The patient has a history of Werner's syndrome diagnosed at age 3.  He had been followed by pediatric neurology.  He been on multiple medications over the years.  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  This was to control seizures and it had a positive effect on that.  The patient has had motor dysfunction and required a tendon release procedure.  When I first saw the patient he was on a combination of Prozac, Keppra and trazodone but continued to struggle with depression.  At that visit we did taper off the Prozac and then started the patient on low-dose venlafaxine.       I also had the patient go for neuropsychological testing.  Please see that data in the chart.      In spring 2018 we did increase the patient's Effexor XL.  In May 2018 we increased the Effexor further to 225 mg a day.     I saw the patient in September 2018 and at that time we did not make any medication changes.     I saw the patient in November 2019.  At that time he was complaining of lower motivation and some difficulty with sleep and poor sleep hygiene.  There were some more situational stressors including the death of his girlfriends mother.  We did did increase the patient's Effexor to 300 mg a day.     I saw the patient in April 2019.  He was doing well at that time.  The increase in Effexor had been helpful.  We did not make any medication changes at that time.     I saw the patient in  October 2019.  He had been furloughed from work and was looking for another job.  He was a bit more depressed and frustrated.  His grandmother was dying with dementia which was difficult for him.  His brother was being deployed to [place].  We did increase his Effexor at that time to 375 mg a da I saw the patient in January 2020. The patient was doing well at that time we did not make any medication changes.     I interviewed the patient as well as a staff member by phone in September 2020.  The patient believes the meds were somewhat low and both he and the staff are requesting to continue with current treatment plan.  We did not make any changes at that visit.        I saw the patient in March 2021.Patient presents today for the purposes of medication management.  He was doing well at that time.  He had recently returned from a family trip to [place].  He was tolerating the medication.  We did not make any changes.    I saw the patient in August 2021.  He was doing well and he recently started a job at the [employer].  His social isolation was improving.  His mood was good.  He was tolerating the medication and we did not make any medication changes at that visit.    The patient was seen in April 2022 and was doing well at that time.  He had recently left his job at [employer] and was going to start being a .  We did not make any medication changes.    The patient was seen for a follow-up on August 2, 2022.  He was doing extremely well at that time and tolerating the medication.  We did not make any medication changes.    The patient had a follow-up visit with me in November 2022.  I also spoke with the patient's staff and both reported things were going extremely well.  He was tolerating the medication and we elected to continue with the treatment plan and make no changes at that time.    The patient had a follow-up appointment with me in February and was doing well and tolerating the medication.  No  new medical issues or concerns and he was neurologically stable.  We elected to further decrease his Effexor down to 300 mg a day.    The patient was seen for follow-up in May 2023.  He was doing quite well at that time and asked about the possibility of a future decrease in the Effexor.  We elected at that time not to make any medication changes as the patient wanted to give it a couple more months.    The patient was seen in August 2023.  He was doing extremely well at that time and tolerating the medication.  We requested consideration for a decrease in his medication and we elected to decrease his Effexor to 150 mg a day.    The patient was seen for follow-up in November 2023 and was doing extremely well at that time.  He was tolerating the medication.  We elected to continue with the treatment plan and did not make any changes.    The patient was seen for follow-up in February 2024.  He was in the process of changing jobs.  He left the last job because of some visual issues.  He continued be followed by the medical team and continued on Keppra.  We elected to continue with his Effexor and trazodone.  His medical team was following labs.    HPI:  Patient presents today for the purposes of medication management.   The patient reports he is doing fairly well.  Weight is busy and he talked about a lot of family celebrations, his mother's penitentiary.  He has siblings pregnancies and birth.  He states overall he spending a lot of time with his family.  He is in the process of switching vocational programs and he is going to set up with seeing a therapist.  He is going go back in for another sleep study as he still sleeps about 5 to 6 hours per night.  He denies having any thoughts of hurting himself or anybody else.  No hallucinations or delusions.  No tamiko or psychosis.  No other new medical issues or concerns.  No tremors or side effects to the medication.        Current Medications: Please see chart. Medications  personally reviewed.     There are no problems to display for this patient.    No past medical history on file.  No past surgical history on file.  No family history on file.  Current Outpatient Medications   Medication Sig Dispense Refill    Ferrous Sulfate 324 (65 Fe) MG TBEC       ibuprofen (ADVIL,MOTRIN) 200 MG tablet [IBUPROFEN (ADVIL,MOTRIN) 200 MG TABLET] Take 400 mg by mouth every 6 (six) hours as needed for pain.      levETIRAcetam (KEPPRA) 750 MG tablet [LEVETIRACETAM (KEPPRA) 750 MG TABLET] Take 3,000 mg by mouth daily.      traZODone (DESYREL) 50 MG tablet Take 1 tablet (50 mg) by mouth at bedtime 30 tablet 3    venlafaxine (EFFEXOR-ER) 150 MG 24 hr tablet Take 1 tablet (150 mg) by mouth daily 30 tablet 4       Allergies   Allergen Reactions    Tegretol [Carbamazepine] Unknown     Social History     Socioeconomic History    Marital status: Single     Spouse name: Not on file    Number of children: Not on file    Years of education: Not on file    Highest education level: Not on file   Occupational History    Not on file   Tobacco Use    Smoking status: Never    Smokeless tobacco: Never   Substance and Sexual Activity    Alcohol use: Not on file    Drug use: Not on file    Sexual activity: Not on file   Other Topics Concern    Not on file   Social History Narrative    Not on file     Social Determinants of Health     Financial Resource Strain: Not on file   Food Insecurity: Not on file   Transportation Needs: Not on file   Physical Activity: Not on file   Stress: Not on file   Social Connections: Not on file   Interpersonal Safety: Not on file   Housing Stability: Not on file       The following portions of the patient's history were reviewed and updated as appropriate: allergies, current medications, past family history, past medical history, past social history, past surgical history and problem list.    Review of Systems  A comprehensive review of systems was negative except for what is noted  above    Mental Status Exam  Appearance: Good eye contact and participation.  The patient was alert, comfortable and calm. No agitation. Not currently in any pain. No evidence of any shortness of breath.  Behavior: He denies any recent behavioral dyscontrol and states he has been participating in family events and other activities.  The patient is calm, cooperative, with no agitation or obvious distress. The patient does participate. No restlessness.   Speech: Able to participate and initiate.  Somewhat simple and concrete.  Able to dialogue. Answers are consistent but somewhat vague.  Not pressured. Not rambling.  Mood/Affect: No evidence of any tamiko or irritability.  Patient appears alert. No obvious depression or anxiety.  No lability.  Thought Content:  No evidence of any psychosis. No reports of any recent psychosis.  Suicidal or Homicidal Thoughts:  None apparent or reported. No recent change reported.  Thought Process/Formulation:  Slow. No reports of any recent change. The patient does need prompts and structure.  No racing thoughts.  Somewhat concrete.  Somewhat vague.  Associations:  Fair.  No recent change.  Able to process information.  Somewhat slow.  He is however able to track and participate appropriately.  Fund of Knowledge: Continues impaired by report.   Attention/Concentration:  Attentive. Able to track and follow. Concentration appears impaired.  Slow.  Somewhat concrete.  Not disorganized.  No obvious recent change.  Insight:  Grossly adequate.  No obvious recent change.   Judgement:  Grossly adequate.  No obvious recent change.  Memory:  Grossly adequate.  Motor Status:  No recent change reported. No current tremor.    Orientation: The patient appears oriented.       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     Plan:  Medication Adjustment:  I will make no medication changes at this  time.  The patient will continue follow-up with his medical team and the sleep clinic.  He should return to this clinic in 3 to 4 months for medication check.       Continue with the support of the clinic, reassurance, and redirection. Staff monitoring and ongoing assessments per team plan. Current psychotropic medication appears to represent the minimum effective dosage and appears medically necessary. We will continue to monitor and reassess. This team will utilize appropriate emergency services if necessary. I will make myself available if concerns or problems arise.        Patient Instructions   It was nice speaking with you today for our office video visit. The following is a summary of our visit.    General Information:    If lab work was done today as part of your evaluation you will generally be contacted via My Chart, mail, or phone with the results within 1-5 days. If there is an alarming result we will contact you by phone. Lab results come back at varying times, I generally wait until all labs are resulted before making comments on results. Please note labs are automatically released to My Chart once available.     If you need refills please contact your pharmacist. They will send a refill request to me to review. Please allow 3 business days for us to process all refill requests.     Please call or send a medical message through My Chart, with any questions or concerns.    If you need any paperwork completed please fax forms to [phone]. Please state if you would like a copy of the completed paperwork, mailed or faxed back to the patient and a fax number to fax the paperwork to. Please allow up to 10 business days for paperwork to be completed.    Ren Manuel MD

## 2024-05-23 NOTE — PATIENT INSTRUCTIONS
I will make no medication changes at this time.  The patient will continue follow-up with his medical team and the sleep clinic.  He should return to this clinic in 3 to 4 months for medication check.

## 2024-05-23 NOTE — LETTER
5/23/2024         RE: Nicholas Daugherty  [address]  [facility] [address]        Dear Colleague,    Thank you for referring your patient, Nicholas Daugherty, to the [facility]. Please see a copy of my visit note below.    Virtual Visit Details    Type of service:  Video Visit   Video Start Time: 12:54 PM  Video End Time: 1:08 PM    Originating Location (pt. Location): Patient's home  Distant Location (provider location): My home office  Platform used for Video Visit: Wedit        Outpatient Follow up TBI Evaluation     Pertinent History:  The patient was referred to this clinic for further assessment and treatment . Patient first saw me January 2 of 2018.  The patient had been followed at that time by Dr. Nguyen in the primary care clinic.  The patient has a history of Werner's syndrome diagnosed at age 3.  He had been followed by pediatric neurology.  He been on multiple medications over the years.  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  This was to control seizures and it had a positive effect on that.  The patient has had motor dysfunction and required a tendon release procedure.  When I first saw the patient he was on a combination of Prozac, Keppra and trazodone but continued to struggle with depression.  At that visit we did taper off the Prozac and then started the patient on low-dose venlafaxine.       I also had the patient go for neuropsychological testing.  Please see that data in the chart.      In spring 2018 we did increase the patient's Effexor XL.  In May 2018 we increased the Effexor further to 225 mg a day.     I saw the patient in September 2018 and at that time we did not make any medication changes.     I saw the patient in November 2019.  At that time he was complaining of lower motivation and some difficulty with sleep and poor sleep hygiene.  There were some more situational stressors including the death of his  girlfriends mother.  We did did increase the patient's Effexor to 300 mg a day.     I saw the patient in April 2019.  He was doing well at that time.  The increase in Effexor had been helpful.  We did not make any medication changes at that time.     I saw the patient in October 2019.  He had been furloughed from work and was looking for another job.  He was a bit more depressed and frustrated.  His grandmother was dying with dementia which was difficult for him.  His brother was being deployed to [place].  We did increase his Effexor at that time to 375 mg a da I saw the patient in January 2020. The patient was doing well at that time we did not make any medication changes.     I interviewed the patient as well as a staff member by phone in September 2020.  The patient believes the meds were somewhat low and both he and the staff are requesting to continue with current treatment plan.  We did not make any changes at that visit.        I saw the patient in March 2021.Patient presents today for the purposes of medication management.  He was doing well at that time.  He had recently returned from a family trip to [place].  He was tolerating the medication.  We did not make any changes.    I saw the patient in August 2021.  He was doing well and he recently started a job at the [employer].  His social isolation was improving.  His mood was good.  He was tolerating the medication and we did not make any medication changes at that visit.    The patient was seen in April 2022 and was doing well at that time.  He had recently left his job at [email or web address] and was going to start being a .  We did not make any medication changes.    The patient was seen for a follow-up on August 2, 2022.  He was doing extremely well at that time and tolerating the medication.  We did not make any medication changes.    The patient had a follow-up visit with me in November 2022.  I also spoke with the patient's staff and both  reported things were going extremely well.  He was tolerating the medication and we elected to continue with the treatment plan and make no changes at that time.    The patient had a follow-up appointment with me in February and was doing well and tolerating the medication.  No new medical issues or concerns and he was neurologically stable.  We elected to further decrease his Effexor down to 300 mg a day.    The patient was seen for follow-up in May 2023.  He was doing quite well at that time and asked about the possibility of a future decrease in the Effexor.  We elected at that time not to make any medication changes as the patient wanted to give it a couple more months.    The patient was seen in August 2023.  He was doing extremely well at that time and tolerating the medication.  We requested consideration for a decrease in his medication and we elected to decrease his Effexor to 150 mg a day.    The patient was seen for follow-up in November 2023 and was doing extremely well at that time.  He was tolerating the medication.  We elected to continue with the treatment plan and did not make any changes.    The patient was seen for follow-up in February 2024.  He was in the process of changing jobs.  He left the last job because of some visual issues.  He continued be followed by the medical team and continued on Keppra.  We elected to continue with his Effexor and trazodone.  His medical team was following labs.    HPI:  Patient presents today for the purposes of medication management.   The patient reports he is doing fairly well.  Weight is busy and he talked about a lot of family celebrations, his mother's FDC.  He has siblings pregnancies and birth.  He states overall he spending a lot of time with his family.  He is in the process of switching vocational programs and he is going to set up with seeing a therapist.  He is going go back in for another sleep study as he still sleeps about 5 to 6 hours per  night.  He denies having any thoughts of hurting himself or anybody else.  No hallucinations or delusions.  No tamiko or psychosis.  No other new medical issues or concerns.  No tremors or side effects to the medication.        Current Medications: Please see chart. Medications personally reviewed.     There are no problems to display for this patient.    No past medical history on file.  No past surgical history on file.  No family history on file.  Current Outpatient Medications   Medication Sig Dispense Refill     Ferrous Sulfate 324 (65 Fe) MG TBEC        ibuprofen (ADVIL,MOTRIN) 200 MG tablet [IBUPROFEN (ADVIL,MOTRIN) 200 MG TABLET] Take 400 mg by mouth every 6 (six) hours as needed for pain.       levETIRAcetam (KEPPRA) 750 MG tablet [LEVETIRACETAM (KEPPRA) 750 MG TABLET] Take 3,000 mg by mouth daily.       traZODone (DESYREL) 50 MG tablet Take 1 tablet (50 mg) by mouth at bedtime 30 tablet 3     venlafaxine (EFFEXOR-ER) 150 MG 24 hr tablet Take 1 tablet (150 mg) by mouth daily 30 tablet 4       Allergies   Allergen Reactions     Tegretol [Carbamazepine] Unknown     Social History     Socioeconomic History     Marital status: Single     Spouse name: Not on file     Number of children: Not on file     Years of education: Not on file     Highest education level: Not on file   Occupational History     Not on file   Tobacco Use     Smoking status: Never     Smokeless tobacco: Never   Substance and Sexual Activity     Alcohol use: Not on file     Drug use: Not on file     Sexual activity: Not on file   Other Topics Concern     Not on file   Social History Narrative     Not on file     Social Determinants of Health     Financial Resource Strain: Not on file   Food Insecurity: Not on file   Transportation Needs: Not on file   Physical Activity: Not on file   Stress: Not on file   Social Connections: Not on file   Interpersonal Safety: Not on file   Housing Stability: Not on file       The following portions of the  patient's history were reviewed and updated as appropriate: allergies, current medications, past family history, past medical history, past social history, past surgical history and problem list.    Review of Systems  A comprehensive review of systems was negative except for what is noted above    Mental Status Exam  Appearance: Good eye contact and participation.  The patient was alert, comfortable and calm. No agitation. Not currently in any pain. No evidence of any shortness of breath.  Behavior: He denies any recent behavioral dyscontrol and states he has been participating in family events and other activities.  The patient is calm, cooperative, with no agitation or obvious distress. The patient does participate. No restlessness.   Speech: Able to participate and initiate.  Somewhat simple and concrete.  Able to dialogue. Answers are consistent but somewhat vague.  Not pressured. Not rambling.  Mood/Affect: No evidence of any tamiko or irritability.  Patient appears alert. No obvious depression or anxiety.  No lability.  Thought Content:  No evidence of any psychosis. No reports of any recent psychosis.  Suicidal or Homicidal Thoughts:  None apparent or reported. No recent change reported.  Thought Process/Formulation:  Slow. No reports of any recent change. The patient does need prompts and structure.  No racing thoughts.  Somewhat concrete.  Somewhat vague.  Associations:  Fair.  No recent change.  Able to process information.  Somewhat slow.  He is however able to track and participate appropriately.  Fund of Knowledge: Continues impaired by report.   Attention/Concentration:  Attentive. Able to track and follow. Concentration appears impaired.  Slow.  Somewhat concrete.  Not disorganized.  No obvious recent change.  Insight:  Grossly adequate.  No obvious recent change.   Judgement:  Grossly adequate.  No obvious recent change.  Memory:  Grossly adequate.  Motor Status:  No recent change reported. No current  tremor.    Orientation: The patient appears oriented.       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     Plan:  Medication Adjustment:  I will make no medication changes at this time.  The patient will continue follow-up with his medical team and the sleep clinic.  He should return to this clinic in 3 to 4 months for medication check.       Continue with the support of the clinic, reassurance, and redirection. Staff monitoring and ongoing assessments per team plan. Current psychotropic medication appears to represent the minimum effective dosage and appears medically necessary. We will continue to monitor and reassess. This team will utilize appropriate emergency services if necessary. I will make myself available if concerns or problems arise.        Patient Instructions   It was nice speaking with you today for our office video visit. The following is a summary of our visit.    General Information:    If lab work was done today as part of your evaluation you will generally be contacted via My Chart, mail, or phone with the results within 1-5 days. If there is an alarming result we will contact you by phone. Lab results come back at varying times, I generally wait until all labs are resulted before making comments on results. Please note labs are automatically released to My Chart once available.     If you need refills please contact your pharmacist. They will send a refill request to me to review. Please allow 3 business days for us to process all refill requests.     Please call or send a medical message through My Chart, with any questions or concerns.    If you need any paperwork completed please fax forms to [phone]. Please state if you would like a copy of the completed paperwork, mailed or faxed back to the patient and a fax number to fax the paperwork to. Please allow up to 10 business days for  paperwork to be completed.    Ren Manuel MD      Again, thank you for allowing me to participate in the care of your patient.        Sincerely,        Ren Manuel MD

## 2024-05-23 NOTE — NURSING NOTE
Is the patient currently in the state of [place]? YES    Visit mode:VIDEO    If the visit is dropped, the patient can be reconnected by: VIDEO VISIT: Text to cell phone:   Telephone Information:   Mobile [phone]       Will anyone else be joining the visit? NO  (If patient encounters technical issues they should call [phone])    How would you like to obtain your AVS? MyChart    Are changes needed to the allergy or medication list? No    Are refills needed on medications prescribed by this physician? NO    Reason for visit: RECHECK    Madison GALEANO

## 2024-05-24 ENCOUNTER — TELEPHONE (OUTPATIENT)
Dept: NEUROLOGY | Facility: CLINIC | Age: 34
End: 2024-05-24
Payer: MEDICARE

## 2024-05-24 NOTE — TELEPHONE ENCOUNTER
[facility] Call Center    Phone Message    May a detailed message be left on voicemail: no     Reason for Call: Other: Enid from [facility] calling to relay that due to insurance concerns, they will be dispensing capsules and not tablets for venlafaxine (EFFEXOR-ER) 150 MG 24 hr tablet medication.    Return Enid's call at [phone] with any questions.    Action Taken: Message routed to:  Other: [facility]    Travel Screening: Not Applicable

## 2024-07-11 ENCOUNTER — TELEPHONE (OUTPATIENT)
Dept: NEUROLOGY | Facility: CLINIC | Age: 34
End: 2024-07-11

## 2024-07-11 ENCOUNTER — VIRTUAL VISIT (OUTPATIENT)
Dept: NEUROLOGY | Facility: CLINIC | Age: 34
End: 2024-07-11
Payer: MEDICARE

## 2024-07-11 VITALS — WEIGHT: 190.2 LBS | BODY MASS INDEX: 24.41 KG/M2 | HEIGHT: 74 IN

## 2024-07-11 DIAGNOSIS — R29.818 NEUROCOGNITIVE DEFICITS: ICD-10-CM

## 2024-07-11 DIAGNOSIS — R41.89 NEUROCOGNITIVE DEFICITS: ICD-10-CM

## 2024-07-11 PROCEDURE — 99214 OFFICE O/P EST MOD 30 MIN: CPT | Mod: 95 | Performed by: PSYCHIATRY & NEUROLOGY

## 2024-07-11 RX ORDER — TRAZODONE HYDROCHLORIDE 50 MG/1
50 TABLET, FILM COATED ORAL AT BEDTIME
Qty: 30 TABLET | Refills: 3 | Status: SHIPPED | OUTPATIENT
Start: 2024-07-11

## 2024-07-11 RX ORDER — VENLAFAXINE HYDROCHLORIDE 150 MG/1
150 TABLET, EXTENDED RELEASE ORAL DAILY
Qty: 30 TABLET | Refills: 4 | Status: SHIPPED | OUTPATIENT
Start: 2024-07-11

## 2024-07-11 ASSESSMENT — PAIN SCALES - GENERAL: PAINLEVEL: NO PAIN (0)

## 2024-07-11 NOTE — LETTER
7/11/2024      Nicholas Daugherty  [address]  [place] [address]      Dear Colleague,    Thank you for referring your patient, Nicholas Daugherty, to the [facility]. Please see a copy of my visit note below.    Virtual Visit Details    Type of service:  Video Visit   Video Start Time: 9:13 AM  Video End Time: 9:25 AM    Originating Location (pt. Location): The patient's residence  Distant Location (provider location): My home office  Platform used for Video Visit: [employer]    Outpatient Follow up TBI Evaluation     Pertinent History:  The patient was referred to this clinic for further assessment and treatment . Patient first saw me January 2 of 2018.  The patient had been followed at that time by Dr. Nguyen in the primary care clinic.  The patient has a history of Werner's syndrome diagnosed at age 3.  He had been followed by pediatric neurology.  He been on multiple medications over the years.  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  This was to control seizures and it had a positive effect on that.  The patient has had motor dysfunction and required a tendon release procedure.  When I first saw the patient he was on a combination of Prozac, Keppra and trazodone but continued to struggle with depression.  At that visit we did taper off the Prozac and then started the patient on low-dose venlafaxine.       I also had the patient go for neuropsychological testing.  Please see that data in the chart.      In spring 2018 we did increase the patient's Effexor XL.  In May 2018 we increased the Effexor further to 225 mg a day.     I saw the patient in September 2018 and at that time we did not make any medication changes.     I saw the patient in November 2019.  At that time he was complaining of lower motivation and some difficulty with sleep and poor sleep hygiene.  There were some more situational stressors including the death of his girlfriends  mother.  We did did increase the patient's Effexor to 300 mg a day.     I saw the patient in April 2019.  He was doing well at that time.  The increase in Effexor had been helpful.  We did not make any medication changes at that time.     I saw the patient in October 2019.  He had been furloughed from work and was looking for another job.  He was a bit more depressed and frustrated.  His grandmother was dying with dementia which was difficult for him.  His brother was being deployed to [place].  We did increase his Effexor at that time to 375 mg a da I saw the patient in January 2020. The patient was doing well at that time we did not make any medication changes.     I interviewed the patient as well as a staff member by phone in September 2020.  The patient believes the meds were somewhat low and both he and the staff are requesting to continue with current treatment plan.  We did not make any changes at that visit.        I saw the patient in March 2021.Patient presents today for the purposes of medication management.  He was doing well at that time.  He had recently returned from a family trip to [place].  He was tolerating the medication.  We did not make any changes.    I saw the patient in August 2021.  He was doing well and he recently started a job at the [employer].  His social isolation was improving.  His mood was good.  He was tolerating the medication and we did not make any medication changes at that visit.    The patient was seen in April 2022 and was doing well at that time.  He had recently left his job at [employer] and was going to start being a .  We did not make any medication changes.    The patient was seen for a follow-up on August 2, 2022.  He was doing extremely well at that time and tolerating the medication.  We did not make any medication changes.    The patient had a follow-up visit with me in November 2022.  I also spoke with the patient's staff and both reported things  were going extremely well.  He was tolerating the medication and we elected to continue with the treatment plan and make no changes at that time.    The patient had a follow-up appointment with me in February and was doing well and tolerating the medication.  No new medical issues or concerns and he was neurologically stable.  We elected to further decrease his Effexor down to 300 mg a day.    The patient was seen for follow-up in May 2023.  He was doing quite well at that time and asked about the possibility of a future decrease in the Effexor.  We elected at that time not to make any medication changes as the patient wanted to give it a couple more months.    The patient was seen in August 2023.  He was doing extremely well at that time and tolerating the medication.  We requested consideration for a decrease in his medication and we elected to decrease his Effexor to 150 mg a day.    The patient was seen for follow-up in November 2023 and was doing extremely well at that time.  He was tolerating the medication.  We elected to continue with the treatment plan and did not make any changes.    The patient was seen for follow-up in February 2024.  He was in the process of changing jobs.  He left the last job because of some visual issues.  He continued be followed by the medical team and continued on Keppra.  We elected to continue with his Effexor and trazodone.  His medical team was following labs.    I saw the patient for follow-up in May 2024.  He was doing well and was busy with family activities.  He was in the process of considering setting up with a therapist.  They also were considering a another sleep study.  He continued to follow-up with his medical team for his medical care and seizure meds.  I did not make any changes.    HPI:  Patient presents today for the purposes of medication management.   The patient presented today for med management visit.  His staff was present.  The patient reported he has  been having a hard time recently because his mother was diagnosed with pancreatic cancer and is in hospice but he states despite this his mood has been relatively good with no depression.  He does not feel hopeless or helpless or worthless.  No thoughts of hurting himself or anybody else.  No seizures.  He continues to follow with his medical team.  I have the patient on Effexor as well as trazodone and he states he is tolerating those medications with no side effects.  He continues to be monitored through his residence and the staff there for his medical needs as well as emotional support and he states that is going very well.  No change in sleep or appetite.  No change in cognition.  No new medical issues or concerns.  No tremors or side effects of the medication.  He denies having any other questions or concerns and would like to continue with the current treatment plan.        Current Medications: Please see chart. Medications personally reviewed.       Current Outpatient Medications   Medication Sig Dispense Refill     Ferrous Sulfate 324 (65 Fe) MG TBEC        ibuprofen (ADVIL,MOTRIN) 200 MG tablet [IBUPROFEN (ADVIL,MOTRIN) 200 MG TABLET] Take 400 mg by mouth every 6 (six) hours as needed for pain.       levETIRAcetam (KEPPRA) 750 MG tablet [LEVETIRACETAM (KEPPRA) 750 MG TABLET] Take 3,000 mg by mouth daily.       traZODone (DESYREL) 50 MG tablet Take 1 tablet (50 mg) by mouth at bedtime 30 tablet 3     venlafaxine (EFFEXOR-ER) 150 MG 24 hr tablet Take 1 tablet (150 mg) by mouth daily 30 tablet 4       Allergies   Allergen Reactions     Tegretol [Carbamazepine] Unknown     Social History     Socioeconomic History     Marital status: Single     Spouse name: Not on file     Number of children: Not on file     Years of education: Not on file     Highest education level: Not on file   Occupational History     Not on file   Tobacco Use     Smoking status: Never     Smokeless tobacco: Never   Substance and Sexual  Activity     Alcohol use: Not on file     Drug use: Not on file     Sexual activity: Not on file   Other Topics Concern     Not on file   Social History Narrative     Not on file     Social Determinants of Health     Financial Resource Strain: Not on file   Food Insecurity: Not on file   Transportation Needs: Not on file   Physical Activity: Not on file   Stress: Not on file   Social Connections: Not on file   Interpersonal Safety: Not on file   Housing Stability: Not on file       The following portions of the patient's history were reviewed and updated as appropriate: allergies, current medications, past family history, past medical history, past social history, past surgical history and problem list.    Review of Systems  A comprehensive review of systems was negative except for what is noted above    Mental Status Exam  Appearance: Patient does not appear uncomfortable.  There is no obvious pain or shortness of breath.  No new issues or concerns are reported.   Behavior: Patient is cooperative.  Able to maintain eye contact. Able to interact appropriately.  No agitation.  Not restless.  No reports of any recent behavioral dyscontrol.  The patient does participate and is cooperative.  Able to initiate.  Speech: Participating with intact sentence structure.  No pressured or rambling quality.  Answers are appropriate and consistent.  Able to dialogue and initiate.  Mood/Affect: No obvious depression or anxiety.  No irritability or lability.  No evidence of any tamiko.  Thought Content:  No evidence of any psychosis. The patient denies any psychotic symptoms. No reports of any recent psychosis.  Suicidal or Homicidal Thoughts: None apparent or reported.  The patient denies having any homicidal or suicidal ideation.  Thought Process/Formulation: Participating.  Able to follow conversation.  No thought blocking.  No racing thoughts.  Not disorganized.  No evidence of any pressured thinking.  Associations:  Grossly  intact.  Processing information appropriately.  Able to track conversation..  Fund of Knowledge:  Grossly intact.   Attention/Concentration: Concentration appears adequate.  The patient is following and participating well.  Attentive.  No disorganization.  No racing thoughts.  Insight:  Grossly intact.   Judgement:  Grossly intact.   Memory:  Grossly intact.   Motor Status:  No recent change reported. No current tremor.  Orientation: Grossly oriented..       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     Plan:  Medication Adjustment:  I will continue with the current psychiatric medication.  He will continue to follow with his medical team his general medical care and his seizure medication.  He should return to this clinic in 2 to 3 months for medication check.  The patient is doing well and tolerating the medication.  He should continue to follow-up with his medical team       Continue with the support of the clinic, reassurance, and redirection. Staff monitoring and ongoing assessments per team plan. Current psychotropic medication appears to represent the minimum effective dosage and appears medically necessary. We will continue to monitor and reassess. This team will utilize appropriate emergency services if necessary. I will make myself available if concerns or problems arise.        Patient Instructions   It was nice speaking with you today for our office video visit. The following is a summary of our visit.    General Information:    If lab work was done today as part of your evaluation you will generally be contacted via My Chart, mail, or phone with the results within 1-5 days. If there is an alarming result we will contact you by phone. Lab results come back at varying times, I generally wait until all labs are resulted before making comments on results. Please note labs are automatically released to  My Chart once available.     If you need refills please contact your pharmacist. They will send a refill request to me to review. Please allow 3 business days for us to process all refill requests.     Please call or send a medical message through My Chart, with any questions or concerns.    If you need any paperwork completed please fax forms to [phone]. Please state if you would like a copy of the completed paperwork, mailed or faxed back to the patient and a fax number to fax the paperwork to. Please allow up to 10 business days for paperwork to be completed.    Ren Manuel MD      Again, thank you for allowing me to participate in the care of your patient.        Sincerely,        Ren Manuel MD

## 2024-07-11 NOTE — PROGRESS NOTES
Virtual Visit Details    Type of service:  Video Visit   Video Start Time: 9:13 AM  Video End Time: 9:25 AM    Originating Location (pt. Location): The patient's residence  Distant Location (provider location): My home office  Platform used for Video Visit: [facility]    Outpatient Follow up TBI Evaluation     Pertinent History:  The patient was referred to this clinic for further assessment and treatment . Patient first saw me January 2 of 2018.  The patient had been followed at that time by Dr. Nguyen in the primary care clinic.  The patient has a history of Werner's syndrome diagnosed at age 3.  He had been followed by pediatric neurology.  He been on multiple medications over the years.  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  This was to control seizures and it had a positive effect on that.  The patient has had motor dysfunction and required a tendon release procedure.  When I first saw the patient he was on a combination of Prozac, Keppra and trazodone but continued to struggle with depression.  At that visit we did taper off the Prozac and then started the patient on low-dose venlafaxine.       I also had the patient go for neuropsychological testing.  Please see that data in the chart.      In spring 2018 we did increase the patient's Effexor XL.  In May 2018 we increased the Effexor further to 225 mg a day.     I saw the patient in September 2018 and at that time we did not make any medication changes.     I saw the patient in November 2019.  At that time he was complaining of lower motivation and some difficulty with sleep and poor sleep hygiene.  There were some more situational stressors including the death of his girlfriends mother.  We did did increase the patient's Effexor to 300 mg a day.     I saw the patient in April 2019.  He was doing well at that time.  The increase in Effexor had been helpful.  We did not make any medication changes at that time.     I saw the patient  in October 2019.  He had been furloughed from work and was looking for another job.  He was a bit more depressed and frustrated.  His grandmother was dying with dementia which was difficult for him.  His brother was being deployed to [place].  We did increase his Effexor at that time to 375 mg a da I saw the patient in January 2020. The patient was doing well at that time we did not make any medication changes.     I interviewed the patient as well as a staff member by phone in September 2020.  The patient believes the meds were somewhat low and both he and the staff are requesting to continue with current treatment plan.  We did not make any changes at that visit.        I saw the patient in March 2021.Patient presents today for the purposes of medication management.  He was doing well at that time.  He had recently returned from a family trip to [place].  He was tolerating the medication.  We did not make any changes.    I saw the patient in August 2021.  He was doing well and he recently started a job at the [employer].  His social isolation was improving.  His mood was good.  He was tolerating the medication and we did not make any medication changes at that visit.    The patient was seen in April 2022 and was doing well at that time.  He had recently left his job at [employer] and was going to start being a .  We did not make any medication changes.    The patient was seen for a follow-up on August 2, 2022.  He was doing extremely well at that time and tolerating the medication.  We did not make any medication changes.    The patient had a follow-up visit with me in November 2022.  I also spoke with the patient's staff and both reported things were going extremely well.  He was tolerating the medication and we elected to continue with the treatment plan and make no changes at that time.    The patient had a follow-up appointment with me in February and was doing well and tolerating the medication.   No new medical issues or concerns and he was neurologically stable.  We elected to further decrease his Effexor down to 300 mg a day.    The patient was seen for follow-up in May 2023.  He was doing quite well at that time and asked about the possibility of a future decrease in the Effexor.  We elected at that time not to make any medication changes as the patient wanted to give it a couple more months.    The patient was seen in August 2023.  He was doing extremely well at that time and tolerating the medication.  We requested consideration for a decrease in his medication and we elected to decrease his Effexor to 150 mg a day.    The patient was seen for follow-up in November 2023 and was doing extremely well at that time.  He was tolerating the medication.  We elected to continue with the treatment plan and did not make any changes.    The patient was seen for follow-up in February 2024.  He was in the process of changing jobs.  He left the last job because of some visual issues.  He continued be followed by the medical team and continued on Keppra.  We elected to continue with his Effexor and trazodone.  His medical team was following labs.    I saw the patient for follow-up in May 2024.  He was doing well and was busy with family activities.  He was in the process of considering setting up with a therapist.  They also were considering a another sleep study.  He continued to follow-up with his medical team for his medical care and seizure meds.  I did not make any changes.    HPI:  Patient presents today for the purposes of medication management.   The patient presented today for med management visit.  His staff was present.  The patient reported he has been having a hard time recently because his mother was diagnosed with pancreatic cancer and is in hospice but he states despite this his mood has been relatively good with no depression.  He does not feel hopeless or helpless or worthless.  No thoughts of  hurting himself or anybody else.  No seizures.  He continues to follow with his medical team.  I have the patient on Effexor as well as trazodone and he states he is tolerating those medications with no side effects.  He continues to be monitored through his residence and the staff there for his medical needs as well as emotional support and he states that is going very well.  No change in sleep or appetite.  No change in cognition.  No new medical issues or concerns.  No tremors or side effects of the medication.  He denies having any other questions or concerns and would like to continue with the current treatment plan.        Current Medications: Please see chart. Medications personally reviewed.       Current Outpatient Medications   Medication Sig Dispense Refill    Ferrous Sulfate 324 (65 Fe) MG TBEC       ibuprofen (ADVIL,MOTRIN) 200 MG tablet [IBUPROFEN (ADVIL,MOTRIN) 200 MG TABLET] Take 400 mg by mouth every 6 (six) hours as needed for pain.      levETIRAcetam (KEPPRA) 750 MG tablet [LEVETIRACETAM (KEPPRA) 750 MG TABLET] Take 3,000 mg by mouth daily.      traZODone (DESYREL) 50 MG tablet Take 1 tablet (50 mg) by mouth at bedtime 30 tablet 3    venlafaxine (EFFEXOR-ER) 150 MG 24 hr tablet Take 1 tablet (150 mg) by mouth daily 30 tablet 4       Allergies   Allergen Reactions    Tegretol [Carbamazepine] Unknown     Social History     Socioeconomic History    Marital status: Single     Spouse name: Not on file    Number of children: Not on file    Years of education: Not on file    Highest education level: Not on file   Occupational History    Not on file   Tobacco Use    Smoking status: Never    Smokeless tobacco: Never   Substance and Sexual Activity    Alcohol use: Not on file    Drug use: Not on file    Sexual activity: Not on file   Other Topics Concern    Not on file   Social History Narrative    Not on file     Social Determinants of Health     Financial Resource Strain: Not on file   Food Insecurity:  Not on file   Transportation Needs: Not on file   Physical Activity: Not on file   Stress: Not on file   Social Connections: Not on file   Interpersonal Safety: Not on file   Housing Stability: Not on file       The following portions of the patient's history were reviewed and updated as appropriate: allergies, current medications, past family history, past medical history, past social history, past surgical history and problem list.    Review of Systems  A comprehensive review of systems was negative except for what is noted above    Mental Status Exam  Appearance: Patient does not appear uncomfortable.  There is no obvious pain or shortness of breath.  No new issues or concerns are reported.   Behavior: Patient is cooperative.  Able to maintain eye contact. Able to interact appropriately.  No agitation.  Not restless.  No reports of any recent behavioral dyscontrol.  The patient does participate and is cooperative.  Able to initiate.  Speech: Participating with intact sentence structure.  No pressured or rambling quality.  Answers are appropriate and consistent.  Able to dialogue and initiate.  Mood/Affect: No obvious depression or anxiety.  No irritability or lability.  No evidence of any tamiko.  Thought Content:  No evidence of any psychosis. The patient denies any psychotic symptoms. No reports of any recent psychosis.  Suicidal or Homicidal Thoughts: None apparent or reported.  The patient denies having any homicidal or suicidal ideation.  Thought Process/Formulation: Participating.  Able to follow conversation.  No thought blocking.  No racing thoughts.  Not disorganized.  No evidence of any pressured thinking.  Associations:  Grossly intact.  Processing information appropriately.  Able to track conversation..  Fund of Knowledge:  Grossly intact.   Attention/Concentration: Concentration appears adequate.  The patient is following and participating well.  Attentive.  No disorganization.  No racing  thoughts.  Insight:  Grossly intact.   Judgement:  Grossly intact.   Memory:  Grossly intact.   Motor Status:  No recent change reported. No current tremor.  Orientation: Grossly oriented..       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     Plan:  Medication Adjustment:  I will continue with the current psychiatric medication.  He will continue to follow with his medical team his general medical care and his seizure medication.  He should return to this clinic in 2 to 3 months for medication check.  The patient is doing well and tolerating the medication.  He should continue to follow-up with his medical team       Continue with the support of the clinic, reassurance, and redirection. Staff monitoring and ongoing assessments per team plan. Current psychotropic medication appears to represent the minimum effective dosage and appears medically necessary. We will continue to monitor and reassess. This team will utilize appropriate emergency services if necessary. I will make myself available if concerns or problems arise.        Patient Instructions   It was nice speaking with you today for our office video visit. The following is a summary of our visit.    General Information:    If lab work was done today as part of your evaluation you will generally be contacted via My Chart, mail, or phone with the results within 1-5 days. If there is an alarming result we will contact you by phone. Lab results come back at varying times, I generally wait until all labs are resulted before making comments on results. Please note labs are automatically released to My Chart once available.     If you need refills please contact your pharmacist. They will send a refill request to me to review. Please allow 3 business days for us to process all refill requests.     Please call or send a medical message through My Chart, with any  questions or concerns.    If you need any paperwork completed please fax forms to [phone]. Please state if you would like a copy of the completed paperwork, mailed or faxed back to the patient and a fax number to fax the paperwork to. Please allow up to 10 business days for paperwork to be completed.    Ren Manuel MD

## 2024-07-11 NOTE — PATIENT INSTRUCTIONS
The patient is doing relatively well and tolerating the medication.  I will make no changes.  The patient should return to this clinic in 3 months for medication check.

## 2024-07-11 NOTE — TELEPHONE ENCOUNTER
Hello, patient had a virtual appointment today with Dr. Manuel. Please assist with getting the patient scheduled for their follow-up appt. Checkout notes below.     Return to this clinic in 3 months. Due around 10/11/2024.         Thank you!  Anisha Clemente, Virtual Visit Facilitator

## 2024-07-11 NOTE — NURSING NOTE
Current patient location: [address]   [facility] [address]    Is the patient currently in the state of [place]? YES    Visit mode:VIDEO    If the visit is dropped, the patient can be reconnected by: VIDEO VISIT: Text to cell phone:   Telephone Information:   Mobile [phone]       Will anyone else be joining the visit? NO  (If patient encounters technical issues they should call [phone]    How would you like to obtain your AVS? MyChart    Are changes needed to the allergy or medication list? No    Are refills needed on medications prescribed by this physician? NO    Reason for visit: Follow Up    Anisha GALEANO

## 2024-12-06 DIAGNOSIS — R41.89 NEUROCOGNITIVE DEFICITS: ICD-10-CM

## 2024-12-06 DIAGNOSIS — R29.818 NEUROCOGNITIVE DEFICITS: ICD-10-CM

## 2024-12-07 ENCOUNTER — HEALTH MAINTENANCE LETTER (OUTPATIENT)
Age: 34
End: 2024-12-07

## 2024-12-10 RX ORDER — TRAZODONE HYDROCHLORIDE 50 MG/1
TABLET, FILM COATED ORAL
Qty: 31 TABLET | Refills: 11 | OUTPATIENT
Start: 2024-12-10

## 2024-12-10 NOTE — TELEPHONE ENCOUNTER
Provider no longer with [facility] system as of 10/15/2024. Pt previously given letter disclosing that refills would not be provided after 10/15. Letter also included direction for future follow up outside of the [facility] for future care and refills.     Refill refused.     Salty SANCHEZ RN, BSN  [facility]

## 2024-12-10 NOTE — TELEPHONE ENCOUNTER
Please deny Rx refill request for traZODone (DESYREL) 50 MG tablet  if appropriate.        Thank you.    Dany Alvarez LAT ATC on 12/10/2024 at 8:31 AM

## 2024-12-28 DIAGNOSIS — R29.818 NEUROCOGNITIVE DEFICITS: ICD-10-CM

## 2024-12-28 DIAGNOSIS — R41.89 NEUROCOGNITIVE DEFICITS: ICD-10-CM

## 2024-12-30 NOTE — TELEPHONE ENCOUNTER
Refill request for the following medication (s) listed below.    Pending Prescriptions:                       Disp   Refills    traZODone (DESYREL) 50 MG tablet [Pharmac*31 tab*11           Sig: TAKE 1 TABLET BY MOUTH AT BEDTIME  (VIALS)      Last office visit provider:  7/11/24    Please deny Rx. Provider no longer in system.       Medication T'd for review and signature  Dany RAMIREZ ATC on 12/30/2024 at 9:03 AM

## 2024-12-31 DIAGNOSIS — R41.89 NEUROCOGNITIVE DEFICITS: ICD-10-CM

## 2024-12-31 DIAGNOSIS — R29.818 NEUROCOGNITIVE DEFICITS: ICD-10-CM

## 2024-12-31 RX ORDER — TRAZODONE HYDROCHLORIDE 50 MG/1
TABLET, FILM COATED ORAL
Qty: 31 TABLET | Refills: 11 | OUTPATIENT
Start: 2024-12-31

## 2024-12-31 RX ORDER — VENLAFAXINE HYDROCHLORIDE 150 MG/1
150 CAPSULE, EXTENDED RELEASE ORAL DAILY
Qty: 30 CAPSULE | Refills: 11 | OUTPATIENT
Start: 2024-12-31

## 2024-12-31 NOTE — TELEPHONE ENCOUNTER
Provider no longer with [facility] system as of 10/15/2024. Pt previously given letter disclosing that refills would not be provided after 10/15. Letter also included direction for future follow up outside of the [facility] for future care and refills.     Refills refused    Salty SANCHEZ RN, BSN  [facility]

## 2024-12-31 NOTE — TELEPHONE ENCOUNTER
Refill request for the following medication (s) listed below.    Pending Prescriptions:                       Disp   Refills    venlafaxine (EFFEXOR XR) 150 MG 24 hr cap*30 cap*11           Sig: TAKE 1 CAPSULE BY MOUTH ONCE DAILY      Last office visit provider:  7/11/24    Please deny Rx.  Provider no longer in system.     Medication T'd for review and signature  Dany RAMIREZ ATC on 12/31/2024 at 12:30 PM

## 2024-12-31 NOTE — TELEPHONE ENCOUNTER
Provider no longer with [facility] system as of 10/15/2024. Pt previously given letter disclosing that refills would not be provided after 10/15. Letter also included direction for future follow up outside of the [facility] for future care and refills.     Refill refused.     Salty SANCHEZ RN, BSN  [facility] Neurology

## 2025-05-15 NOTE — PROGRESS NOTES
"Occupational Therapy  OT General Progress Note    Medicare Insurance    Units: 4 ADL  Total Minutes: 55 minutes  Treatment #: 3/6    ASSESSMENT  Summary & Analysis of treatment: Patient arrived to session with his staff member. Patient was motivated to make a budget using a tracker online. Pt was also motivated to start saving money and manage his expenses better after starting his job in the next week. Patient required min assistance to evaluate the most effective strategy to avoid overspending.  Pt continues to be appropriate for skilled OT services to improve independence.    See initial evaluation for goals and POC. Progress toward goals: Improved    PLAN  Treatment time: Self-care / ADL training 55 minutes    Plan: 1.Continue per plan of care. and 2.Patient progressing towards goals.   Long-term goals to be met by discharge date (6 sessions):  1. Patient will report using a budget to demonstrate greater independence with financial management.  2. Patient will create an organizational system for paying pills to compensate for memory difficulty.  3. Patient will demonstrate paying bills correctly 100% of time with the use of compensatory strategies to increase independence.  4. Patient will demonstrate appropriate use of cooking utensil AE for increased independence and safety with meal preparation.- NEW GOAL    Recommendations: Review online tracker, practice check writing and checkbook register, consider cash only method of spending money management, adaptive cooking utensils (can opener, cutting board, rocker knife, etc).    SUBJECTIVE  Pain: No  Pt reported he felt ready to use an online tracker. Pt reported he starts a new job in the next week. 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    OBJECTIVE  -Patient downloaded \"everydollar\" budget online tool and filled out all areas of expenses and income. Patient was able to look through bank statements online to effectively " "determine monthly costs during session. Pt was able to compare monthly costs with his projected income with his new job and determine areas which needed changing with min A from therapist and staff member. Therapist and patient discussed strategies to assist with staying within budget by setting \"projected amounts\" realistically. Patient demonstrated understanding of using a password save to assist with memory recall. Pt also demonstrated the use of the iphone nadja to increase use of the budget tool.    Marlena Montez, OTR/L, CLT, 2/5/2018    " Yes